# Patient Record
Sex: MALE | Race: WHITE | NOT HISPANIC OR LATINO | Employment: UNEMPLOYED | URBAN - METROPOLITAN AREA
[De-identification: names, ages, dates, MRNs, and addresses within clinical notes are randomized per-mention and may not be internally consistent; named-entity substitution may affect disease eponyms.]

---

## 2017-03-21 ENCOUNTER — ALLSCRIPTS OFFICE VISIT (OUTPATIENT)
Dept: OTHER | Facility: OTHER | Age: 47
End: 2017-03-21

## 2017-04-04 ENCOUNTER — HOSPITAL ENCOUNTER (OUTPATIENT)
Dept: RADIOLOGY | Age: 47
Discharge: HOME | End: 2017-04-04
Payer: COMMERCIAL

## 2017-04-04 DIAGNOSIS — E04.1 NONTOXIC SINGLE THYROID NODULE: ICD-10-CM

## 2017-04-04 PROCEDURE — 76536 US EXAM OF HEAD AND NECK: CPT

## 2017-04-06 ENCOUNTER — GENERIC CONVERSION - ENCOUNTER (OUTPATIENT)
Dept: OTHER | Facility: OTHER | Age: 47
End: 2017-04-06

## 2017-04-07 ENCOUNTER — ALLSCRIPTS OFFICE VISIT (OUTPATIENT)
Dept: OTHER | Facility: OTHER | Age: 47
End: 2017-04-07

## 2017-04-07 DIAGNOSIS — E78.5 HYPERLIPIDEMIA: ICD-10-CM

## 2017-04-07 DIAGNOSIS — E10.9 TYPE 1 DIABETES MELLITUS WITHOUT COMPLICATIONS (HCC): ICD-10-CM

## 2017-04-07 DIAGNOSIS — I10 ESSENTIAL (PRIMARY) HYPERTENSION: ICD-10-CM

## 2017-04-07 DIAGNOSIS — E04.1 NONTOXIC SINGLE THYROID NODULE: ICD-10-CM

## 2017-07-03 ENCOUNTER — GENERIC CONVERSION - ENCOUNTER (OUTPATIENT)
Dept: OTHER | Facility: OTHER | Age: 47
End: 2017-07-03

## 2017-07-31 ENCOUNTER — TRANSCRIBE ORDERS (OUTPATIENT)
Dept: ADMINISTRATIVE | Age: 47
End: 2017-07-31

## 2017-07-31 ENCOUNTER — APPOINTMENT (OUTPATIENT)
Dept: LAB | Age: 47
End: 2017-07-31
Payer: COMMERCIAL

## 2017-07-31 DIAGNOSIS — E78.5 HYPERLIPIDEMIA: ICD-10-CM

## 2017-07-31 DIAGNOSIS — I10 ESSENTIAL (PRIMARY) HYPERTENSION: ICD-10-CM

## 2017-07-31 DIAGNOSIS — E04.1 NONTOXIC SINGLE THYROID NODULE: ICD-10-CM

## 2017-07-31 DIAGNOSIS — Z00.8 HEALTH EXAMINATION IN POPULATION SURVEY: ICD-10-CM

## 2017-07-31 DIAGNOSIS — E10.9 TYPE 1 DIABETES MELLITUS WITHOUT COMPLICATIONS (HCC): ICD-10-CM

## 2017-07-31 DIAGNOSIS — Z00.8 HEALTH EXAMINATION IN POPULATION SURVEY: Primary | ICD-10-CM

## 2017-07-31 LAB
ALBUMIN SERPL BCP-MCNC: 3.7 G/DL (ref 3.5–5)
ALP SERPL-CCNC: 101 U/L (ref 46–116)
ALT SERPL W P-5'-P-CCNC: 37 U/L (ref 12–78)
ANION GAP SERPL CALCULATED.3IONS-SCNC: 5 MMOL/L (ref 4–13)
AST SERPL W P-5'-P-CCNC: 20 U/L (ref 5–45)
BILIRUB SERPL-MCNC: 0.61 MG/DL (ref 0.2–1)
BUN SERPL-MCNC: 18 MG/DL (ref 5–25)
CALCIUM SERPL-MCNC: 9.3 MG/DL (ref 8.3–10.1)
CHLORIDE SERPL-SCNC: 103 MMOL/L (ref 100–108)
CHOLEST SERPL-MCNC: 144 MG/DL (ref 50–200)
CO2 SERPL-SCNC: 29 MMOL/L (ref 21–32)
CREAT SERPL-MCNC: 0.94 MG/DL (ref 0.6–1.3)
CREAT UR-MCNC: 96.2 MG/DL
EST. AVERAGE GLUCOSE BLD GHB EST-MCNC: 180 MG/DL
GFR SERPL CREATININE-BSD FRML MDRD: 98 ML/MIN/1.73SQ M
GLUCOSE P FAST SERPL-MCNC: 164 MG/DL (ref 65–99)
HBA1C MFR BLD: 7.9 % (ref 4.2–6.3)
HDLC SERPL-MCNC: 33 MG/DL (ref 40–60)
LDLC SERPL CALC-MCNC: 80 MG/DL (ref 0–100)
MICROALBUMIN UR-MCNC: 17.4 MG/L (ref 0–20)
MICROALBUMIN/CREAT 24H UR: 18 MG/G CREATININE (ref 0–30)
POTASSIUM SERPL-SCNC: 4 MMOL/L (ref 3.5–5.3)
PROT SERPL-MCNC: 7.4 G/DL (ref 6.4–8.2)
SODIUM SERPL-SCNC: 137 MMOL/L (ref 136–145)
T4 FREE SERPL-MCNC: 1.01 NG/DL (ref 0.76–1.46)
TRIGL SERPL-MCNC: 156 MG/DL
TSH SERPL DL<=0.05 MIU/L-ACNC: 1.99 UIU/ML (ref 0.36–3.74)

## 2017-07-31 PROCEDURE — 84439 ASSAY OF FREE THYROXINE: CPT

## 2017-07-31 PROCEDURE — 80053 COMPREHEN METABOLIC PANEL: CPT

## 2017-07-31 PROCEDURE — 36415 COLL VENOUS BLD VENIPUNCTURE: CPT

## 2017-07-31 PROCEDURE — 83036 HEMOGLOBIN GLYCOSYLATED A1C: CPT

## 2017-07-31 PROCEDURE — 80061 LIPID PANEL: CPT

## 2017-07-31 PROCEDURE — 84443 ASSAY THYROID STIM HORMONE: CPT

## 2017-07-31 PROCEDURE — 82570 ASSAY OF URINE CREATININE: CPT

## 2017-07-31 PROCEDURE — 82043 UR ALBUMIN QUANTITATIVE: CPT

## 2017-08-01 ENCOUNTER — GENERIC CONVERSION - ENCOUNTER (OUTPATIENT)
Dept: OTHER | Facility: OTHER | Age: 47
End: 2017-08-01

## 2017-09-07 ENCOUNTER — ALLSCRIPTS OFFICE VISIT (OUTPATIENT)
Dept: OTHER | Facility: OTHER | Age: 47
End: 2017-09-07

## 2017-10-18 ENCOUNTER — ALLSCRIPTS OFFICE VISIT (OUTPATIENT)
Dept: OTHER | Facility: OTHER | Age: 47
End: 2017-10-18

## 2017-10-25 NOTE — PROGRESS NOTES
Assessment  1  Type 1 diabetes mellitus without complication (743 76) (Y43 7)   2  Nontoxic single thyroid nodule (241 0) (E04 1)   3  Benign essential hypertension (401 1) (I10)   4  Diabetic retinopathy (250 50,362 01) (E11 319)   5  Hyperlipidemia (272 4) (E78 5)   6  Hypoglycemia (251 2) (E16 2)    Plan  Benign essential hypertension    · (1) COMPREHENSIVE METABOLIC PANEL; Status:Active; Requested AXK:[id number];    Perform:[facility]; RNX:07FRN1725; Ordered; For:Benign essential hypertension; Ordered By:Edison Spangler;   · (1) MICROALBUMIN CREATININE RATIO, RANDOM URINE; Status:Active; Requested  WRW:47ARR1733;    Perform:[facility]; [id number]; Ordered; For:Benign essential hypertension; Ordered By:Edison Spangler;  PMH: History of type 2 diabetes mellitus    · MetFORMIN HCl ER (OSM) 500 MG Oral Tablet Extended Release 24 Hour; take  2 tablets twice a day   Rx By: Sadiq Salas; Dispense: 0 Days ; #:360 Tablet Extended Release 24 Hour; Refill: 3;For: PMH: History of type 2 diabetes mellitus; DELIA = N; Sent To: [address]i [address]au[address]; Msg to Pharmacy: 500mg once daily week 1/ 500mg BID week 2/1000mg then 500mg week 3/1000mg BID week 4; Last Updated By: Jeffry Christianson; 2017 9:11:34 AM  Type 1 diabetes mellitus without complication    · HumuLIN R U-500 (CONCENTRATED) 500 UNIT/ML Subcutaneous Solution;  INJECT 200 UNITS (0 4ML) PER DAY VIA INSULIN PUMP   Rx By: Sadiq Salas; Dispense: 0 Days ; #:2 X 20 ML Vial; Refill: 7;For: Type 1 diabetes mellitus without complication; DELIA = N; Sent To: Andi Chauhan Dr; Last Updated By: Jeffry Christianson; 2017 9:11:33 AM   · (1) HEMOGLOBIN A1C; Status:Active; Requested JPZ:[id number];    Perform:[facility]; WLH:11BSN4290; Ordered; For:Type 1 diabetes mellitus without complication; Ordered By:Sandra Spangler;    Discussion/Summary  Discussion Summary:   1  Type 1 diabetes on insulin pump: He is having hyperglycemia overnight and after meals  For his overnight hyperglycemia, I increased his 12 AM basal rate to 1 350, 4 AM basal rate to 1 450 and 8 AM basal rate to 1  10  For his post prandial hyperglycemia, set his I:C ration to 50  I have also started him on metformin starting at 500 mg once daily and progressing to 1000 mg twice daily over the next few weeks  Discussed proper bolusing of insulin for meals at length during the time of the visit  He will start bolusing insulin earlier prior to his meal to mitigate long bouts of hyperglycemia post meal  No changes to his pump settings at this time  He will check his sugars/download his sensor and forward a report to the office in 2 weeks for review and adjustment if necessary  Discussed the increased risk for hypoglycemia especially while utilizing U500 insulin via insulin pump  Episodes of hypoglycemic and lead to permanent disability and death  Reviewed recognition and proper treatment of hypoglycemic episodes  Check hemoglobin A1c prior to next visit  Thyroid nodules: Stable on most recent ultrasound  Thyroid function testing is normal   Hypertension: He is normotensive in the office today  Continue ramipril  Check comprehensive metabolic panel and urine microalbumin  Hyperlipidemia: Continue atorvastatin  Counseling Documentation With Imm: The patient was counseled regarding diagnostic results,-- instructions for management,-- risk factor reductions,-- patient and family education,-- impressions,-- risks and benefits of treatment options,-- importance of compliance with treatment  Medication SE Review and Pt Understands Tx: Possible side effects of new medications were reviewed with the patient/guardian today  The treatment plan was reviewed with the patient/guardian   The patient/guardian understands and agrees with the treatment plan      Chief Complaint  Chief Complaint Free Text Note Form: follow up      History of Present Illness  HPI: 43-year-old male presents in the office today for follow-up of type 1 diabetes, hypertension, hyperlipidemia and thyroid nodule  He was diagnosed with type 1 diabetes at age 24 and he is currently using an animus pump with Humulin U500 insulin and a DEXCOM sensor  He explains that he frequently has hyperglycemia after meals and feels as though he needs to bolus to correct his high blood sugars hours after his meals  He denies any recent episodes of hypoglycemia  He admits to polyuria, polydipsia and blurry vision when his sugars are high  His most recent hemoglobin A1c obtained on July 31, 2017 is 7 9  His current pump settings are as follows:AM-1 225AM-1 350AM-0 975PM-1 100Ratio-1 unit for every 60programs:[id number]-150glucose target range:[id number]-120Beunin every 3 months for Diabetic eye exams and treatment retinopathy/glaucoma  He has no complaints about his feet and does not follow podiatry for regular diabetic foot care  his hypertension, he takes ramp ramipril 10 mg daily  hyperlipidemia is treated with atorvastatin 80 mg daily  most recent ultrasound of the thyroid performed on April 4, 2017 showed multiple stable thyroid nodules less than 1 cm  His TSH on July 31, 2017 was 1 990 with a free T4 of 1 01  He has no complaints of neck pain, dysphagia or dysphonia  He also has no complaints/symptoms of hypo-or hyperthyroidism  Review of Systems  Endo Adult ROS Male Established v2 - [facility]:   Constitutional/General: no recent weight gain,-- no recent weight loss,-- no poor energy/fatigue,-- no increased energy level,-- no insomnia/sleep problems,-- no fever-- and-- no feeling weak  Heart: no high blood pressure,-- no chest pain/tightness,-- no rapid/racing heart rate-- and-- no palpitations  Genitourinary - Urinary no frequent urination,-- no excess urination-- and-- no urinating during the night  Eyes: no blurred vision,-- no double vision,-- no bulging eyes,-- no gritty/scratchy eyes-- and-- no excessive tearing     Mouth / Throat: no hoarseness-- and-- no difficulty swallowing  Neck: no lumps,-- no swollen glands,-- no neck pain,-- no neck stiffness-- and-- no enlarged thyroid  Respiratory: no wheezing,-- no asthma-- and-- no persistent cough  Musculoskeletal: no muscle aches/pain,-- no joint aches/pain-- and-- no muscle weakness  Skin & Hair: no dry skin,-- no acne,-- the hair texture was not oily,-- no hair loss-- and-- no excessive hair growth  Gastrointestinal: no constipation,-- no diarrhea,-- no waking at night to drink-- and-- no stomach ache  Neurological: no blackouts,-- no weakness-- and-- no tremors  Genital: no testicular pain-- and-- no testicular lumps/bumps/mass  Endocrine: no feeling hot frequently,-- no feeling cold frequently,-- no shifts between feeling hot and cold,-- no cold hands or feet,-- no excessive sweating,-- no thyroid problems,-- no blood sugar problems,-- no excessive thirst,-- no excessive hunger,-- no change in shoe size,-- no nausea or vomiting-- and-- no shaky hands  ROS Reviewed:   ROS reviewed  Active Problems  1  Abdominal discomfort (789 00) (R10 9)   2  Acute URI (465 9) (J06 9)   3  Acute UTI (599 0) (N39 0)   4  Benign essential hypertension (401 1) (I10)   5  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)   6  Cough (786 2) (R05)   7  Diabetic retinopathy (250 50,362 01) (E11 319)   8  Dysuria (788 1) (R30 0)   9  Erectile dysfunction (607 84) (N52 9)   10  Erectile dysfunction of non-organic origin (302 72) (F52 21)   11  History of allergy (V15 09) (Z88 9)   12  Hyperlipidemia (272 4) (E78 5)   13  Hypoglycemia (251 2) (E16 2)   14  Leaking of urine (788 30) (R32)   15  Lumbar back pain (724 2) (M54 5)   16  Lumbar dysfunction (739 3) (M99 9)   17  Need for influenza vaccination (V04 81) (Z23)   18  Need for prophylactic vaccination and inoculation against influenza (V04 81) (Z23)   19  Nontoxic single thyroid nodule (241 0) (E04 1)   20   Strain of right psoas muscle (843 8) (69 857 56 57)   21  Type 1 diabetes mellitus without complication (018 84) (V96 2)   22  Voiding dysfunction (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 9) (N39 8)    Past Medical History  1  History of Cough (786 2) (R05)   2  History of diabetes mellitus (V12 29) (Z86 39)   3  History of shortness of breath (V13 89) (Z87 898)   4  History of upper respiratory infection (V12 09) (Z87 09)   5  Need for prophylactic vaccination and inoculation against influenza (V04 81) (Z23)  Active Problems And Past Medical History Reviewed: The active problems and past medical history were reviewed and updated today  Surgical History  1  Denied: History Of Prior Surgery  Surgical History Reviewed: The surgical history was reviewed and updated today  Family History  Mother    1  Family history of Cancer   2  Family history of Lung Cancer (V16 1)  Maternal Grandfather    3  Family history of Cancer   4  Family history of Diabetes Mellitus (V18 0)  Family History    5  Family history of Diabetes Mellitus (V18 0)   6  Family history of cardiac disorder (V17 49) (Z82 49)   7  Family history of hypertension (V17 49) (Z82 49)  Family History Reviewed: The family history was reviewed and updated today  Social History   · Alcohol use   · Being A Social Drinker   · Caffeine Use   · Denied: History of Chewing Nicotine-containing Substances   · Denied: History of Drug Use   · Former smoker (V15 82) (P55 401)   · Marital History - Currently    · Occupation: Homemaker  Social History Reviewed: The social history was reviewed and updated today  Current Meds   1  Aspirin 81 MG TABS; TAKE 1 TABLET DAILY; Therapy: 34YCB2782 to Recorded   2  Atorvastatin Calcium 80 MG Oral Tablet; take one tablet by mouth every day; Therapy: 64Wmk9599 to (Last Rx:60Hzp6314)  Requested for: 32Cun6691 Ordered   3  BD Pen Needle Ashli U/F 32G X 4 MM Miscellaneous; Use three times per day; Therapy: 57VJF1347 to (Evaluate:15Mar2015)  Requested for: 75NOK9828;  Last Rx:20Mar2014 Ordered   4  Glucagon Emergency 1 MG Injection Kit; USE AS DIRECTED; Therapy: 71NPQ1451 to (Evaluate:15Mar2015)  Requested for: 27PSD9186; Last   Rx:20Mar2014 Ordered   5  HumuLIN R U-500 (CONCENTRATED) 500 UNIT/ML Subcutaneous Solution; INJECT   200 UNITS (0 4ML) PER DAY VIA INSULIN PUMP; Therapy: 12HKU5733 to (Last Rx:66Vcs3656)  Requested for: 79HQL7870 Ordered   6  Ibuprofen 200 MG Oral Tablet; Therapy: (Recorded:14Apr2015) to Recorded   7  Lumigan 0 01 % Ophthalmic Solution; INSTILL 1 DROP INTO BOTH EYES ONCE DAILY   IN THE EVENING; Therapy: 79GNN3868 to Recorded   8  Metoclopramide HCl - 5 MG Oral Tablet; TAKE 1 TABLET Every 6 hours; Therapy: 46PNK9456 to Recorded   9  Multivitamins CAPS; Therapy: (Emy Ariza) to Recorded   10  Omeprazole 20 MG Oral Capsule Delayed Release; TAKE 1 CAPSULE DAILY; Therapy: 16SCL9681 to (Evaluate:08Dec2013) Recorded   11  OneTouch Delica Lancets Fine Miscellaneous; Check Sugars 2x daily as directed; Therapy: 69LOX4629 to (Evaluate:04Jun2017)  Requested for: 09VYL5192; Last    Rx:06Mar2017 Ordered   12  OneTouch Ultra 2 w/Device Kit; Test blood Sugars 2 times daily as directed; Therapy: 91YFH1387 to (Last Rx:06Mar2017)  Requested for: 69OXR3695 Ordered   13  OneTouch Ultra Blue In [employer]; check BG 2x per day; Therapy: 04QJA9751 to (Jeremy Palacios)  Requested for: 43QVW1480; Last    Rx:06Mar2017 Ordered   14  Ramipril 10 MG Oral Capsule; One tablet po daily; Therapy: 43EPG3841 to (Esequiel Gandara)  Requested for: 03SQN5030; Last    Rx:18Ygf3588 Ordered   15  Vitamin D 2000 UNIT Oral Tablet; Therapy: (Recorded:20Jun2013) to Recorded  Medication List Reviewed: The medication list was reviewed and updated today  Allergies  1   No Known Drug Allergies    Vitals  Vital Signs    Recorded: 07Sep2017 08:27AM   Heart Rate 92   Systolic 345   Diastolic 70   Height 5 ft 11 in   Weight 291 lb 4 00 oz   BMI Calculated 40 62 BSA Calculated 2 47     Physical Exam    Constitutional   General appearance: No acute distress, well appearing and well nourished  Eyes   Conjunctiva and lids: No swelling, erythema, or discharge  Pupils: Equal, round and reactive to light  The sclera are anicteric  Extraocular movements are intact  Ears, Nose, Mouth, and Throat   External inspection of ears, nose and lips: Normal     Oropharynx: Normal with no erythema, edema, exudate or lesions  Exam of Head: The head is atraumatic and normocephalic  Neck: The neck is supple  The thyroid is normal in size with no palpable nodules  Pulmonary   Respiratory effort: No increased work of breathing or signs of respiratory distress  Auscultation of lungs: Clear to auscultation bilaterally with normal chest expansion  Cardiovascular   Auscultation of heart: Normal rate and rhythm with no murmurs, gallops or rubs  Examination of extremities for edema and/or varicosities: Normal     Examination of pulses: Dorsalis pedal pulses are +2 and equal bilaterally  Examination of Carotids: No bruits  Abdomen   Abdomen: Abdomen is soft, non-tender with normal bowel sounds  Liver and spleen: No hepatomegaly or splenomegaly  Lymphatic   Palpation of lymph nodes: No supraclavicular or suboccipital lymphadenopathy  Musculoskeletal   Gait and station: Normal     Digits and nails: Normal without clubbing or cyanosis  Inspection/palpation of joints, bones, and muscles: Muscle bulk and tone is normal     Skin   Skin and subcutaneous tissue: Abnormal  -- Dry skin  Neurologic   Cranial nerves: Cranial nerves 2-12 intact  Reflexes: 2+ and symmetric  Sensation: No sensory loss  Motor Strength: Strength is 5/5 bilaterally  Psychiatric   Orientation to person, place and time: Normal     Mood and affect: Affect and attention span are normal       Socks and shoes removed, Right Foot Findings: normal foot, no swelling, no erythema   The right toes were normal-- and-- had full range of motion  The sensory exam showed normal vibratory sensation at the level of the toes on the right  Normal tactile sensation with monofilament testing throughout the right foot  Socks and shoes removed, Left Foot Findings: normal foot, no swelling, no erythema  The left toes were normal-- and-- had full range of motion  The sensory exam showed normal vibratory sensation at the level of the toes on the left  Normal tactile sensation with monofilament testing throughout the left foot  Pulses:   2+ in the dorsalis pedis on the right  Pulses:   2+ in the dorsalis pedis on the left  Results/Data  (1) HEMOGLOBIN A1C 92Bhk1717 08:28AM [facility] Order Number: [id number]     Test Name Result Flag Reference   HEMOGLOBIN A1C 7 9 % H 4 2-6 3   EST  AVG  GLUCOSE 180 mg/dl       (1) TSH 50BWW5738 08:28AM [facility] Order Number: [id number]     Test Name Result Flag Reference   TSH 1 990 uIU/mL  0 358-3 740   - Patient Instructions: This bloodwork is non-fasting  Please drink two glasses of water morning of bloodwork  Patients undergoing fluorescein dye angiography may retain small amounts of fluorescein in the body for 48-72 hours post procedure  Samples containing fluorescein can produce falsely depressed TSH values  If the patient had this procedure,a specimen should be resubmitted post fluorescein clearance  (1) T4, FREE 39Roo3256 08:28AM [facility] Order Number: [id number]     Test Name Result Flag Reference   T4,FREE 1 01 ng/dL  0 76-1 46     (1) LIPID PANEL, FASTING 78Ebd6069 08:28AM [facility] Order Number: [id number]     Test Name Result Flag Reference   CHOLESTEROL 144 mg/dL     HDL,DIRECT 33 mg/dL L 40-60   Specimen collection should occur prior to Metamizole administration due to the potential for falsely depressed results     LDL CHOLESTEROL CALCULATED 80 mg/dL 0-100   - Patient Instructions: This is a fasting blood test  Water,black tea or black  coffee only after 9:00pm the night before test   Drink 2 glasses of water the morning of test       Triglyceride:         Normal              <150 mg/dl       Borderline High    150-199 mg/dl       High               200-499 mg/dl       Very High          >499 mg/dl  Cholesterol:         Desirable        <200 mg/dl      Borderline High  200-239 mg/dl      High             >239 mg/dl  HDL Cholesterol:        High    >59 mg/dL      Low     <41 mg/dL  LDL CALCULATED:    This screening LDL is a calculated result  It does not have the accuracy of the Direct Measured LDL in the monitoring of patients with hyperlipidemia and/or statin therapy  Direct Measure LDL (IWX256) must be ordered separately in these patients  TRIGLYCERIDES 156 mg/dL H <=150   Specimen collection should occur prior to N-Acetylcysteine or Metamizole administration due to the potential for falsely depressed results       (1) MICROALBUMIN CREATININE RATIO, RANDOM URINE 97Mgj2014 08:28AM [facility] Order Number: [id number]     Test Name Result Flag Reference   MICROALBUMIN/ CREAT R 18 mg/g creatinine  0-30   MICROALBUMIN,URINE 17 4 mg/L  0 0-20 0   CREATININE URINE 96 2 mg/dL       (1) COMPREHENSIVE METABOLIC PANEL 56OQK5491 35:[facility] Order Number: [id number]     Test Name Result Flag Reference   SODIUM 137 mmol/L  136-145   POTASSIUM 4 0 mmol/L  3 5-5 3   CHLORIDE 103 mmol/L  100-108   CARBON DIOXIDE 29 mmol/L  21-32   ANION GAP (CALC) 5 mmol/L  4-13   BLOOD UREA NITROGEN 18 mg/dL  5-25   CREATININE 0 94 mg/dL  0 60-1 30   Standardized to IDMS reference method   CALCIUM 9 3 mg/dL  8 3-10 1   BILI, TOTAL 0 61 mg/dL  0 20-1 00   ALK PHOSPHATAS 101 U/L     ALT (SGPT) 37 U/L  12-78   AST(SGOT) 20 U/L  5-45   ALBUMIN 3 7 g/dL  3 5-5 0   TOTAL PROTEIN 7 4 g/dL  6 4-8 2   eGFR 98 ml/min/1 73sq m     [employer] Disease Education Program recommendations are as follows:  GFR calculation is accurate only with a steady state creatinine  Chronic Kidney disease less than 60 ml/min/1 73 sq  meters  Kidney failure less than 15 ml/min/1 73 sq  meters  GLUCOSE FASTING 164 mg/dL H 65-99     US THYROID 04Apr2017 10:49AM [facility] Order Number: [id number]    - Patient Instructions: To schedule this appointment, please contact Central Scheduling at [phone]  Test Name Result Flag Reference   US THYROID (Report)     THYROID ULTRASOUND     INDICATION: Follow-up nodules     COMPARISON: 1/13/2015     TECHNIQUE:  Ultrasound of the thyroid was performed with a high frequency linear transducer in transverse and sagittal planes including volumetric imaging sweeps as well as traditional still imaging technique  FINDINGS:   Normal homogeneous smooth echotexture  Right gland: 2 1 x 2 0 x 5 1 cm  Right midpole nodule measuring 0 5 x 0 7 x 0 7 cm  Coarsely calcified nodule  Unchanged from prior  INTERMEDIATE SUSPICION PATTERN (estimated risk of malignancy 10-20%) FNA recommended at >/= 1 cm  Right midpole nodule measuring 0 4 x 0 7 x 0 7 cm  Solid hypoechoic nodule  Unchanged from prior  INTERMEDIATE SUSPICION PATTERN (estimated risk of malignancy 10-20%) FNA recommended at >/= 1 cm  Right lower pole nodule measuring 0 8 x 0 8 x 0 9 cm  Spongiform nodule  Unchanged from prior  VERY LOW SUSPICION PATTERN (estimated risk of malignancy < 3%) consider FNA >/= 2 cm versus observation  Left gland: 1 4 x 1 7 x 4 2 cm  Left midpole nodule measuring 0 4 x 0 6 x 0 6 cm  Solid hypoechoic nodule  Not measured previously  INTERMEDIATE SUSPICION PATTERN (estimated risk of malignancy 10-20%) FNA recommended at >/= 1 cm  Isthmus: The isthmus is 0 3 cm in AP dimension  Right isthmus measuring 0 3 x 0 5 x 0 6 cm  Purely cystic nodule  This nodule has decreased in size from prior   BENIGN (estimated risk of malignancy <1%) FNA not indicated by SARITA criteria  IMPRESSION:      There are multiple thyroid nodules identified as above  These do not meet ultrasound criteria for fine needle aspiration  Reference: 2015 American Thyroid Association Management Guidelines for Adult Patients with Thyroid Nodules and Differentiated Thyroid Cancer  Thyroid, Volume 26, Number 1, 2016          Workstation performed: [id number]     Signed by:   Lisset Lara MD   4/5/17     Signatures   Electronically signed by : Belinda Jensen Sep  7 2017  9:25AM EST                       (Author)    Electronically signed by : CLAU Salcido ; Sep  7 2017 12:13PM EST

## 2017-11-01 DIAGNOSIS — E10.9 TYPE 1 DIABETES MELLITUS WITHOUT COMPLICATIONS (HCC): ICD-10-CM

## 2017-11-01 DIAGNOSIS — I10 ESSENTIAL (PRIMARY) HYPERTENSION: ICD-10-CM

## 2017-12-08 ENCOUNTER — GENERIC CONVERSION - ENCOUNTER (OUTPATIENT)
Dept: OTHER | Facility: OTHER | Age: 47
End: 2017-12-08

## 2017-12-08 DIAGNOSIS — E10.9 TYPE 1 DIABETES MELLITUS WITHOUT COMPLICATIONS (HCC): ICD-10-CM

## 2017-12-08 DIAGNOSIS — M79.672 PAIN OF LEFT FOOT: ICD-10-CM

## 2017-12-11 ENCOUNTER — GENERIC CONVERSION - ENCOUNTER (OUTPATIENT)
Dept: OTHER | Facility: OTHER | Age: 47
End: 2017-12-11

## 2017-12-13 ENCOUNTER — GENERIC CONVERSION - ENCOUNTER (OUTPATIENT)
Dept: OTHER | Facility: OTHER | Age: 47
End: 2017-12-13

## 2018-01-02 ENCOUNTER — APPOINTMENT (OUTPATIENT)
Dept: RADIOLOGY | Age: 48
End: 2018-01-02
Payer: COMMERCIAL

## 2018-01-02 ENCOUNTER — TRANSCRIBE ORDERS (OUTPATIENT)
Dept: ADMINISTRATIVE | Age: 48
End: 2018-01-02

## 2018-01-02 DIAGNOSIS — M79.672 PAIN OF LEFT FOOT: ICD-10-CM

## 2018-01-02 PROCEDURE — 73630 X-RAY EXAM OF FOOT: CPT

## 2018-01-04 ENCOUNTER — GENERIC CONVERSION - ENCOUNTER (OUTPATIENT)
Dept: OTHER | Facility: OTHER | Age: 48
End: 2018-01-04

## 2018-01-10 NOTE — PROGRESS NOTES
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)   6  Cough (786 2) (R05)   7  Diabetic retinopathy (250 50,362 01) (E11 319)   8  Dysuria (788 1) (R30 0)   9  Erectile dysfunction (607 84) (N52 9)   10  Erectile dysfunction of non-organic origin (302 72) (F52 21)   11  History of allergy (V15 09) (Z88 9)   12  Hyperlipidemia (272 4) (E78 5)   13  Hypoglycemia (251 2) (E16 2)   14  Leaking of urine (788 30) (R32)   15  Lumbar back pain (724 2) (M54 5)   16  Lumbar dysfunction (739 3) (M99 9)   17  Need for influenza vaccination (V04 81) (Z23)   18  Need for prophylactic vaccination and inoculation against influenza (V04 81) (Z23)   19  Nontoxic single thyroid nodule (241 0) (E04 1)   20  Strain of right psoas muscle (843 8) (S76 011A)   21  Type 1 diabetes mellitus without complication (684 98) (O72 9)   22  Voiding dysfunction (599 9) (N39 8)    Current Meds   1  Aspirin 81 MG TABS; TAKE 1 TABLET DAILY; Therapy: 39QYF3500 to Recorded   2  Atorvastatin Calcium 80 MG Oral Tablet; take one tablet by mouth every day; Therapy: 91Nat2037 to (Last Rx:65Hid5728)  Requested for: 36Lcj9746 Ordered   3  BD Pen Needle Ashli U/F 32G X 4 MM Miscellaneous; Use three times per day; Therapy: 14FVN9140 to (Evaluate:15Mar2015)  Requested for: 87YOT4350; Last   Rx:20Mar2014 Ordered   4  Glucagon Emergency 1 MG Injection Kit; USE AS DIRECTED; Therapy: 11ITH2387 to (Evaluate:15Mar2015)  Requested for: 97OXP2724; Last   Rx:20Mar2014 Ordered   5  HumuLIN R U-500 (CONCENTRATED) 500 UNIT/ML Subcutaneous Solution; INJECT   200 UNITS (0 4ML) PER DAY VIA INSULIN PUMP; Therapy: 39MPZ5544 to (Last Rx:95Otx4691)  Requested for: 07Sep2017 Ordered   6  Ibuprofen 200 MG Oral Tablet; Therapy: (Recorded:74Sso5736) to Recorded   7  Lumigan 0 01 % Ophthalmic Solution; INSTILL 1 DROP INTO BOTH EYES ONCE DAILY   IN THE EVENING; Therapy: 87JLM1367 to Recorded   8  MetFORMIN HCl ER (OSM) 500 MG Oral Tablet Extended Release 24 Hour; take 2   tablets twice a day; Therapy: 99NYK3431 to (Last Rx:33Kjd6550)  Requested for: 41Ckn8901 Ordered   9  Metoclopramide HCl - 5 MG Oral Tablet; TAKE 1 TABLET Every 6 hours; Therapy: 66VFL3664 to Recorded   10  Multivitamins CAPS; Therapy: (Yosvany Martinez) to Recorded   11  Omeprazole 20 MG Oral Capsule Delayed Release; TAKE 1 CAPSULE DAILY; Therapy: 03KXB5519 to (Evaluate:64Mzl7233) Recorded   12  OneTouch Delica Lancets Fine Miscellaneous; Check Sugars 2x daily as directed; Therapy: 71QBM5927 to (Evaluate:04Jun2017)  Requested for: 13NGE2729; Last    Rx:06Mar2017 Ordered   13  OneTouch Ultra 2 w/Device Kit; Test blood Sugars 2 times daily as directed; Therapy: 34QTG7945 to (Last Rx:06Mar2017)  Requested for: 67ARR0336 Ordered   14  OneTouch Ultra Blue In [employer]; check BG 2x per day; Therapy: 60KVQ1114 to (Gelene Ormond)  Requested for: 78VEZ8682; Last    Rx:06Mar2017 Ordered   15  Ramipril 10 MG Oral Capsule; One tablet po daily; Therapy: 59LJT3670 to (Rikki Yeung)  Requested for: 55QHW3747; Last    Rx:69Gsi3552 Ordered   16  Vitamin D 2000 UNIT Oral Tablet; Therapy: (Recorded:20Jun2013) to Recorded    Allergies    1  No Known Drug Allergies    Plan  Flu vaccine need    · Fluzone Quadrivalent Intramuscular Suspension    Future Appointments    Date/Time Provider Specialty Site   12/08/2017 11:30 AM CLAU [place]   Endocrinology [address] [facility] ENDOCRINOLOGY     Signatures   Electronically signed by : Indra Dallas DO; Oct 18 2017  3:01PM EST                       (Author)

## 2018-01-12 NOTE — RESULT NOTES
Message   There are multiple nodules present that are small  Repeat ultrasound in about a year  Verified Results  US THYROID [id number] 10:49AM Oli Cortes Order Number: [id number]    - Patient Instructions: To schedule this appointment, please contact Central Scheduling at [phone]  Test Name Result Flag Reference   US THYROID (Report)     THYROID ULTRASOUND     INDICATION: Follow-up nodules     COMPARISON: 1/13/2015     TECHNIQUE:  Ultrasound of the thyroid was performed with a high frequency linear transducer in transverse and sagittal planes including volumetric imaging sweeps as well as traditional still imaging technique  FINDINGS:   Normal homogeneous smooth echotexture  Right gland: 2 1 x 2 0 x 5 1 cm  Right midpole nodule measuring 0 5 x 0 7 x 0 7 cm  Coarsely calcified nodule  Unchanged from prior  INTERMEDIATE SUSPICION PATTERN (estimated risk of malignancy 10-20%) FNA recommended at >/= 1 cm  Right midpole nodule measuring 0 4 x 0 7 x 0 7 cm  Solid hypoechoic nodule  Unchanged from prior  INTERMEDIATE SUSPICION PATTERN (estimated risk of malignancy 10-20%) FNA recommended at >/= 1 cm  Right lower pole nodule measuring 0 8 x 0 8 x 0 9 cm  Spongiform nodule  Unchanged from prior  VERY LOW SUSPICION PATTERN (estimated risk of malignancy < 3%) consider FNA >/= 2 cm versus observation  Left gland: 1 4 x 1 7 x 4 2 cm  Left midpole nodule measuring 0 4 x 0 6 x 0 6 cm  Solid hypoechoic nodule  Not measured previously  INTERMEDIATE SUSPICION PATTERN (estimated risk of malignancy 10-20%) FNA recommended at >/= 1 cm  Isthmus: The isthmus is 0 3 cm in AP dimension  Right isthmus measuring 0 3 x 0 5 x 0 6 cm  Purely cystic nodule  This nodule has decreased in size from prior  BENIGN (estimated risk of malignancy <1%) FNA not indicated by SARITA criteria  IMPRESSION:      There are multiple thyroid nodules identified as above   These do not meet ultrasound criteria for fine needle aspiration  Reference: 2015 American Thyroid Association Management Guidelines for Adult Patients with Thyroid Nodules and Differentiated Thyroid Cancer  Thyroid, Volume 26, Number 1, 2016          Workstation performed: [id number]     Signed by:   Lebron Walker MD   4/5/17

## 2018-01-12 NOTE — RESULT NOTES
Message   Hemoglobin A1c is stable  No evidence of kidney disease  Verified Results  (1) HEMOGLOBIN A1C [id number] 04:51PM Tamela Brewsterster Order Number: [id number]     Test Name Result Flag Reference   HEMOGLOBIN A1C 8 1 % H 4 2-6 3   EST  AVG  GLUCOSE 186 mg/dl       (1) COMPREHENSIVE METABOLIC PANEL 77RUC5253 87:01ZQ Tamela Leiter Order Number: [id number]     Test Name Result Flag Reference   GLUCOSE,RANDM 131 mg/dL     If the patient is fasting, the ADA then defines impaired fasting glucose as > 100 mg/dL and diabetes as > or equal to 123 mg/dL  SODIUM 139 mmol/L  136-145   POTASSIUM 4 7 mmol/L  3 5-5 3   CHLORIDE 104 mmol/L  100-108   CARBON DIOXIDE 28 mmol/L  21-32   ANION GAP (CALC) 7 mmol/L  4-13   BLOOD UREA NITROGEN 15 mg/dL  5-25   CREATININE 0 94 mg/dL  0 60-1 30   Standardized to IDMS reference method   CALCIUM 9 2 mg/dL  8 3-10 1   BILI, TOTAL 0 75 mg/dL  0 20-1 00   ALK PHOSPHATAS 94 U/L     ALT (SGPT) 44 U/L  12-78   AST(SGOT) 21 U/L  5-45   ALBUMIN 4 1 g/dL  3 5-5 0   TOTAL PROTEIN 7 5 g/dL  6 4-8 2   eGFR Non-African American      >60 0 ml/min/1 73sq m   [facility] Disease Education Program recommendations are as follows:  GFR calculation is accurate only with a steady state creatinine  Chronic Kidney disease less than 60 ml/min/1 73 sq  meters  Kidney failure less than 15 ml/min/1 73 sq  meters       (1) MICROALBUMIN CREATININE RATIO, RANDOM URINE [id number] 04:51PM Tamela Brewsterster Order Number: [id number]     Test Name Result Flag Reference   MICROALBUMIN/ CREAT R 17 mg/g creatinine  0-30   MICROALBUMIN,URINE 11 8 mg/L  0 0-20 0   CREATININE URINE 69 5 mg/dL

## 2018-01-13 VITALS
SYSTOLIC BLOOD PRESSURE: 126 MMHG | DIASTOLIC BLOOD PRESSURE: 70 MMHG | WEIGHT: 291.25 LBS | BODY MASS INDEX: 40.77 KG/M2 | HEIGHT: 71 IN | HEART RATE: 92 BPM

## 2018-01-13 VITALS
BODY MASS INDEX: 41.49 KG/M2 | HEIGHT: 71 IN | SYSTOLIC BLOOD PRESSURE: 140 MMHG | HEART RATE: 84 BPM | WEIGHT: 296.38 LBS | DIASTOLIC BLOOD PRESSURE: 80 MMHG

## 2018-01-13 NOTE — PROGRESS NOTES
Chief Complaint  Chief Complaint Free Text Note Form: 42 yr-old T1DM patient returns for pump education follow-up  Plan    1  DSMT/MNT Time Record; Status:Complete;   Done: 78BPU2968 01:21PM    Discussion/Summary  Discussion Summary:   Rena Pradhan reports that since changing his carb ratio back to a setting appropriate for U-500 insulin, he has been experiencing more hyperglycemia  He notes that when he tries to correct his bg, it takes too long for him to see an effect  He also asks when he should consider changing his set, since he can't see or feel when his cannula may be bent  He also reports problems with the CGM component of his [place] Vibe pump  Since receiving a new transmitter, he has experienced frequent "Out of Range" alerts which he rarely had before  He also sees big discrepancies between his bg meter and sensor  He has taken his sensor off because of this  I contacted [employer] and they referred the patient to [facility] for sensor issues with the Vibe  The patient will call [employer] support to report the problems he has been having  We reviewed hyperglycemia protocol: if first correction dose does not bring sugar down, change infusion set  I asked Rena Pradhan where he wears his infusion sets, and he described a very limited area on his (R)LQ  There was marked, visible hypertrophy in this area, very asymmetric with the contour of his abd on the (L)  I reviewed proper site rotation with Rena Pradhan and advised him to avoid the hypertrophied area for several months  He has numerous potential sites on the other side of his abd and also on the (R) more lateral to the problem area  I also demonstrated the Contact-Detach steel needle set and gave Rena Pradhan some samples  Should he still experience bent cannulae despite moving sites, he will try these  His pump and sensor were downloaded   He has an appointment with Arlet Velazco PA-C on 10/31/16; he was advised to call sooner if hyperglycemia persists despite these measures, in case his pump settings need adjustment  Future Appointments    Date/Time Provider Specialty Site   10/31/2016 11:10 AM CLAU Morfin   Endocrinology [facility] ENDOCRINOLOGY   03/03/2017 01:45 PM Pattie Castillo, [facility] Urology  [address]     Signatures   Electronically signed by : Lucy Sánchez, ; Sep 27 2016  1:46PM EST                       (Author)    Electronically signed by : CLAU Peters ; Sep 28 2016  9:09AM EST

## 2018-01-14 NOTE — PROGRESS NOTES
Chief Complaint  Chief Complaint Free Text Note Form: 42 yr-old T1DM patient referred by Dr Byron Rojas for insulin pump review  Results/Data  DSMT/MNT Time Record [id number] 02:32PM Bren Patch     Test Name Result Flag Reference   Date of Service 9/1/16     Start - Stop Time 1-1:30PM     Total MInutes 30     Group Or Individual Instruction DSMT-I         Plan    1  DSMT/MNT Time Record; Status:Complete;   Done: 92CKN7401 02:32PM    Discussion/Summary  Discussion Summary:   Muriel Vicente currently uses an [place] Vibe insulin pump  He is not currently using the Dexcom CGM feature due to a defective transmitter that the  is replacing  He knows how to set up connectivity when his new transmitter arrives  The patient describes insulin doses for carbohydrates calculated by the pump that seem too large  He usually backs off on the amount or else his bg will go too low  He also notes that his pump does not seem to credit him for insulin on board when he calculates a correction dose  He reports that his bg's always seem to go up too much right after meals, despite trying to give his bolus 30 minutes before eating  He uses U-500 insulin in his pump  The patient upgraded from an [place] 20/20 pump within the past year, and set up his new pump himself without  assistance  His Insulin On Board feature was OFF, and we enabled it today  Insulin action time was set at 6 hours  His carb ratio was programmed as 12  Previous office notes and pump downloads indicate carb ratios in the 35-40 range, which would be more appropriate for U-500 use  After consultation with Dr Byron Rojas, the patient's ratio was changed to 61  We discussed U-500 insulin action time  The patient will try bolusing 45 minutes before eating when able, to try to decrease his post-meal rise  He will start a new Dexcom sensor session tonight  He is unable to download his Dexcom at home   He agrees to return in 2 weeks for [employer] and evaluation of changes documented above  Future Appointments    Date/Time Provider Specialty Site   10/31/2016 11:10 AM CLAU Morfin   Endocrinology [facility] ENDOCRINOLOGY   09/15/2016 11:00 AM Nathan Bar Diabetes Educator [facility] ENDOCRINOLOGY   03/03/2017 01:45 PM Pattie Castillo, [facility] Urology  [address] [facility]     Signatures   Electronically signed by : Lucy Sánchez, ; Sep  1 2016  2:55PM EST                       (Author)    Electronically signed by : CLAU Peters ; Sep  6 2016 10:47AM EST

## 2018-01-15 NOTE — MISCELLANEOUS
Provider Comments  Provider Comments:   PATIENT NO SHOWED FOR 3- APPT  Signatures   Electronically signed by :  Leopold Barker, [place]; Mar 22 2017  9:49AM EST                       (Author)

## 2018-01-15 NOTE — RESULT NOTES
Message   Normal thyroid testing     Verified Results  (1) T4, FREE 49Ugj9971 04:31PM Imelda [employer]ation Order Number: [id number]     Test Name Result Flag Reference   T4,FREE 0 87 ng/dL  0 76-1 46   - Patient Instructions: This bloodwork is non-fasting  Please drink two glasses of water morning of bloodwork  (1) TSH 45Qyz8591 04:31PM [employer] Order Number: [id number]     Test Name Result Flag Reference   TSH 2 170 uIU/mL  0 358-3 740   - Patient Instructions: This bloodwork is non-fasting  Please drink two glasses of water morning of bloodwork  - Patient Instructions: This bloodwork is non-fasting  Please drink two glasses of water morning of bloodwork  Patients undergoing fluorescein dye angiography may retain small amounts of fluorescein in the body for 48-72 hours post procedure  Samples containing fluorescein can produce falsely depressed TSH values  If the patient had this procedure,a specimen should be resubmitted post fluorescein clearance

## 2018-01-16 NOTE — PROGRESS NOTES
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)   6  Cough (786 2) (R05)   7  Diabetic retinopathy (250 50,362 01) (E11 319)   8  Dysuria (788 1) (R30 0)   9  Erectile dysfunction (607 84) (N52 9)   10  Erectile dysfunction of non-organic origin (302 72) (F52 21)   11  History of allergy (V15 09) (Z88 9)   12  Hyperlipidemia (272 4) (E78 5)   13  Hypoglycemia (251 2) (E16 2)   14  Leaking of urine (788 30) (R32)   15  Lumbar back pain (724 2) (M54 5)   16  Lumbar dysfunction (739 3) (M99 9)   17  Need for prophylactic vaccination and inoculation against influenza (V04 81) (Z23)   18  Nontoxic single thyroid nodule (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) (E04 1)   19  Strain of right psoas muscle (843 8) (S76 311A)   20  Type 1 diabetes mellitus without complication (523 82) (U94 4)   21  Voiding dysfunction (599 9) (N39 8)    Current Meds   1  Aspirin 81 MG TABS; TAKE 1 TABLET DAILY; Therapy: 26LJE8281 to Recorded   2  Atorvastatin Calcium 80 MG Oral Tablet; take one tablet by mouth every day; Therapy: 75Fpe3118 to (Last Rx:46Ndh8807)  Requested for: 90Kld1475 Ordered   3  BD Pen Needle Ashli U/F 32G X 4 MM Miscellaneous; Use three times per day; Therapy: 05FDE6926 to (Evaluate:15Mar2015)  Requested for: 85BGT9625; Last   Rx:20Mar2014 Ordered   4  Glucagon Emergency 1 MG Injection Kit; USE AS DIRECTED; Therapy: 33MKQ9285 to (Evaluate:15Mar2015)  Requested for: 65UUW9846; Last   Rx:20Mar2014 Ordered   5  HumuLIN R U-500 (CONCENTRATED) 500 UNIT/ML Subcutaneous Solution; INJECT   200 UNITS (0 4ML) PER DAY VIA INSULIN PUMP; Therapy: 50NUZ9241 to (Last Rx:02Jun2016)  Requested for: 61Ciq9364 Ordered   6  Ibuprofen 200 MG Oral Tablet; Therapy: (Recorded:14Apr2015) to Recorded   7   Lumigan 0 01 % Ophthalmic Solution; INSTILL 1 DROP INTO BOTH EYES ONCE DAILY   IN THE EVENING; Therapy: 13QVI4308 to Recorded   8  Metoclopramide HCl - 5 MG Oral Tablet; TAKE 1 TABLET Every 6 hours; Therapy: 12HBO4703 to Recorded   9  Multivitamins CAPS; Therapy: (Muriel Daft) to Recorded   10  Omeprazole 20 MG Oral Capsule Delayed Release; TAKE 1 CAPSULE DAILY; Therapy: 54VUM5360 to (Evaluate:20Kwc8107) Recorded   11  Ramipril 5 MG Oral Capsule; TAKE 1 CAPSULE DAILY; Therapy: 54GJM7902 to (Evaluate:92Pps7734)  Requested for: 15Rae6343; Last    Rx:75Fkm6737 Ordered   12  Viagra 100 MG Oral Tablet; TAKE 1 TABLET BY MOUTH 1 HOUR PRIOR TO    INTERCOURSE; Therapy: 32CKZ7418 to (Evaluate:10Aug2016)  Requested for: 57UDN0075; Last    Rx:13Jan2016 Ordered   13  Viagra 50 MG Oral Tablet; TAKE 1 TABLET DAILY 1 HOUR BEFORE NEEDED; Therapy: 96YAR2979 to (Evaluate:03Jan2016)  Requested for: 76Hyp4685; Last    Rx:06Zxx6620 Ordered   14  Vitamin D 2000 UNIT Oral Tablet; Therapy: (Recorded:20Jun2013) to Recorded    Allergies    1  No Known Drug Allergies    Plan  Need for influenza vaccination    · Fluzone Quadrivalent Intramuscular Suspension    Future Appointments    Date/Time Provider Specialty Site   10/31/2016 11:10 AM CLAU Savage   Endocrinology [facility] ENDOCRINOLOGY   03/03/2017 01:45 PM Mayito Mendiola [facility] Urology [address]     Signatures   Electronically signed by : Salvatore Gomez DO; Oct 14 2016 10:39AM EST                       (Author)

## 2018-01-16 NOTE — RESULT NOTES
Message  Our records indicate that you are overdue for bloodwork  Endocrinology ordered bloodwork for you, please get that done as soon as possible so they can continue to monitor your diabetes  We can be reached at [phone]   Thank you      Signatures   Electronically signed by : Zakia Perea, ; Jul  3 2017 11:55AM EST                       (Author)

## 2018-01-16 NOTE — MISCELLANEOUS
Message  Return to work or school:        Our records indicate that you are overdue for your foot evaluation  Please contact your foot doctor and make an appointment  If you have any questions please contact our office at [phone]  Thank you     Kimberli Forbes Do/am       Signatures   Electronically signed by : Lizeth Tom, ; Aug 15 2016 11:12AM EST                       (Author)

## 2018-01-17 NOTE — RESULT NOTES
Verified Results  (1) HEMOGLOBIN A1C 77Dgh4268 08:28AM [facility] Order Number: [id number]     Test Name Result Flag Reference   HEMOGLOBIN A1C 7 9 % H 4 2-6 3   EST  AVG  GLUCOSE 180 mg/dl       (1) TSH 28VSU5558 08:28AM [facility] Order Number: [id number]     Test Name Result Flag Reference   TSH 1 990 uIU/mL  0 358-3 740   - Patient Instructions: This bloodwork is non-fasting  Please drink two glasses of water morning of bloodwork  Patients undergoing fluorescein dye angiography may retain small amounts of fluorescein in the body for 48-72 hours post procedure  Samples containing fluorescein can produce falsely depressed TSH values  If the patient had this procedure,a specimen should be resubmitted post fluorescein clearance  (1) T4, FREE 31Ssg9950 08:28AM [facility] Order Number: [id number]     Test Name Result Flag Reference   T4,FREE 1 01 ng/dL  0 76-1 46     (1) LIPID PANEL, FASTING 81Ewx2047 08:28AM [facility] Order Number: [id number]     Test Name Result Flag Reference   CHOLESTEROL 144 mg/dL     HDL,DIRECT 33 mg/dL L 40-60   Specimen collection should occur prior to Metamizole administration due to the potential for falsely depressed results  LDL CHOLESTEROL CALCULATED 80 mg/dL  0-100   - Patient Instructions: This is a fasting blood test  Water,black tea or black  coffee only after 9:00pm the night before test   Drink 2 glasses of water the morning of test       Triglyceride:         Normal              <150 mg/dl       Borderline High    150-199 mg/dl       High               200-499 mg/dl       Very High          >499 mg/dl  Cholesterol:         Desirable        <200 mg/dl      Borderline High  200-239 mg/dl      High             >239 mg/dl  HDL Cholesterol:        High    >59 mg/dL      Low     <41 mg/dL  LDL CALCULATED:    This screening LDL is a calculated result    It does not have the accuracy of the Direct Measured LDL in the monitoring of patients with hyperlipidemia and/or statin therapy  Direct Measure LDL (LER939) must be ordered separately in these patients  TRIGLYCERIDES 156 mg/dL H <=150   Specimen collection should occur prior to N-Acetylcysteine or Metamizole administration due to the potential for falsely depressed results  (1) MICROALBUMIN CREATININE RATIO, RANDOM URINE 70Upq0047 08:28AM Ruthie Moore Order Number: [id number]     Test Name Result Flag Reference   MICROALBUMIN/ CREAT R 18 mg/g creatinine  0-30   MICROALBUMIN,URINE 17 4 mg/L  0 0-20 0   CREATININE URINE 96 2 mg/dL       (1) COMPREHENSIVE METABOLIC PANEL 39HCR9530 28:79OB [employer] Order Number: [id number]     Test Name Result Flag Reference   SODIUM 137 mmol/L  136-145   POTASSIUM 4 0 mmol/L  3 5-5 3   CHLORIDE 103 mmol/L  100-108   CARBON DIOXIDE 29 mmol/L  21-32   ANION GAP (CALC) 5 mmol/L  4-13   BLOOD UREA NITROGEN 18 mg/dL  5-25   CREATININE 0 94 mg/dL  0 60-1 30   Standardized to IDMS reference method   CALCIUM 9 3 mg/dL  8 3-10 1   BILI, TOTAL 0 61 mg/dL  0 20-1 00   ALK PHOSPHATAS 101 U/L     ALT (SGPT) 37 U/L  12-78   AST(SGOT) 20 U/L  5-45   ALBUMIN 3 7 g/dL  3 5-5 0   TOTAL PROTEIN 7 4 g/dL  6 4-8 2   eGFR 98 ml/min/1 73sq m     National Kidney Disease Education Program recommendations are as follows:  GFR calculation is accurate only with a steady state creatinine  Chronic Kidney disease less than 60 ml/min/1 73 sq  meters  Kidney failure less than 15 ml/min/1 73 sq  meters     GLUCOSE FASTING 164 mg/dL H 65-99

## 2018-01-18 ENCOUNTER — ALLSCRIPTS OFFICE VISIT (OUTPATIENT)
Dept: OTHER | Facility: OTHER | Age: 48
End: 2018-01-18

## 2018-01-18 NOTE — MISCELLANEOUS
Provider Comments  Provider Comments:   PATIENT NO SHOWED FOR 10- APPT  Signatures   Electronically signed by :  Kavitha Dawn; Oct 31 2016  9:36AM EST                       (Author)

## 2018-01-22 ENCOUNTER — ALLSCRIPTS OFFICE VISIT (OUTPATIENT)
Dept: OTHER | Facility: OTHER | Age: 48
End: 2018-01-22

## 2018-01-23 NOTE — RESULT NOTES
Verified Results  * XR FOOT 3+ VIEW LEFT 93MYK6254 10:21AM Paige Mojica    Order Number: [id number]     Test Name Result Flag Reference   XR FOOT 3+ VW LEFT (Report)     LEFT FOOT     INDICATION: A06 681: Pain in left foot  History taken directly from the electronic ordering system  COMPARISON: None     VIEWS: 3     IMAGES: 3     FINDINGS:     There is no acute fracture or dislocation  No degenerative changes  No lytic or blastic lesions are seen  Soft tissues are unremarkable  IMPRESSION:     No acute osseous abnormality         Workstation performed: [id number]     Signed by:   Anamaria Clark MD   1/4/18

## 2018-01-23 NOTE — RESULT NOTES
Discussion/Summary   A1c has remained unchanged  Verified Results  (1) HEMOGLOBIN A1C 28NCT8077 10:48AM Kevin Pate     Test Name Result Flag Reference   HEMOGLOBIN A1C 8 0     EST  AVG   GLUCOSE 183

## 2018-01-23 NOTE — PROGRESS NOTES
Chief Complaint  Chief Complaint Free Text Note Form: 41 yr-old T1DM patient, referred by Dr Vandana Hilliard to explore insulin pump upgrade options  Discussion/Summary  Discussion Summary:   Juan Manuel Alexander has been using an [place] Vibe insulin pump for several years, and doesn't know when his warranty is up  He currently uses U-500, although he notes that since starting Victoza his sugars have been much lower and he wonders if at some point he will be able to use U-100 insulin again  He really likes his Dexcom CGM and would be interested in a pump that is compatible  We contacted [employer] and verified Alex's pump is in warranty until 11/16/19  Ander Lin will support his pump until 9/30/19  The patient stated he is willing to wait out his current warranty and switch to the Tandem t:slim X2 at that time  We discussed the fact that any hybrid closed loop features on future pump software upgrades could only be used with U-100 insulin  Product and contact information for [employer] was provided  The patient was encouraged to contact me for upgrade training when he receives his new pump         Future Appointments    Date/Time Provider Specialty Site   01/22/2018 01:00 PM Christiano Blake,  Family Medicine [facility]   03/08/2018 09:00 AM Erlinda Zhao, [facility] Endocrinology [facility] ENDOCRINOLOGY     Signatures   Electronically signed by : Yesi James, ; Jan 18 2018 10:53AM EST                       (Author)    Electronically signed by : CLAU Akhtar ; Jan 18 2018 10:53AM EST

## 2018-01-23 NOTE — PROGRESS NOTES
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)    Plan   Accessory skin tags    · Follow-up PRN Evaluation and Treatment  Follow-up  Status: Complete  Done:    [id number] 01:29PM    Discussion/Summary      Here for skin tags removed times 4 without complications  Call if any problems  Proper sterile technique used  The patient was counseled regarding  The treatment plan was reviewed with the patient/guardian  The patient/guardian understands and agrees with the treatment plan      Chief Complaint   Pt here to remove a lesion on the neck  History of Present Illness   HPI: Pt here to remove a lesion on the neck  Here for excision of 4 skin tags  Pt  is a diabetic and is much better with Victoza  Review of Systems        Constitutional: no fever or chills, feels well, no tiredness, no recent weight loss or weight gain  ENT: no complaints of earache, no loss of hearing, no nosebleeds or nasal discharge, no sore throat or hoarseness  Cardiovascular: no complaints of slow or fast heart rate, no chest pain, no palpitations, no leg claudication or lower extremity edema  Respiratory: no complaints of shortness of breath, no wheezing or cough, no dyspnea on exertion, no orthopnea or PND  Gastrointestinal: no complaints of abdominal pain, no constipation, no nausea or vomiting, no diarrhea or bloody stools  Genitourinary: no complaints of dysuria or incontinence, no hesitancy, no nocturia, no genital lesion, no inadequacy of penile erection  Musculoskeletal: no complaints of arthralgia, no myalgia, no joint swelling or stiffness, no limb pain or swelling  Integumentary: as noted in HPI  Neurological: no complaints of headache, no confusion, no numbness or tingling, no dizziness or fainting  Active Problems   1  Abdominal discomfort (789 00) (R10 9)   2  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 39) (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)   3  Acute URI (465 9) (J06 9)   4  Acute UTI (599 0) (N39 0)   5   Benign essential hypertension (401 1) (I10)   6  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)   7  Cough (786 2) (R05)   8  Diabetic retinopathy (250 50,362 01) (E11 319)   9  Dysuria (788 1) (R30 0)   10  Erectile dysfunction (607 84) (N52 9)   11  Erectile dysfunction of non-organic origin (302 72) (F52 21)   12  Flu vaccine need (V04 81) (Z23)   13  History of allergy (V15 09) (Z88 9)   14  Hyperlipidemia (272 4) (E78 5)   15  Hypoglycemia (251 2) (E16 2)   16  Leaking of urine (788 30) (R32)   17  Left foot pain (729 5) (M79 672)   18  Lumbar back pain (724 2) (M54 5)   19  Lumbar dysfunction (739 3) (M99 9)   20  Need for influenza vaccination (V04 81) (Z23)   21  Need for prophylactic vaccination and inoculation against influenza (V04 81) (Z23)   22  Nontoxic single thyroid nodule (241 0) (E04 1)   23  Strain of right psoas muscle (843 8) (S76 011A)   24  Type 1 diabetes mellitus without complication (705 00) (B89 1)   25  Uncontrolled type 2 diabetes mellitus without complication, with long-term current use of      insulin (250 02,V58 67) (E11 65,Z79 4)   26  Voiding dysfunction (599 9) (N39 8)    Past Medical History   1  History of Cough (786 2) (R05)   2  History of diabetes mellitus (V12 29) (Z86 39)   3  History of shortness of breath (V13 89) (Z87 898)   4  History of upper respiratory infection (V12 09) (Z87 09)   5  Need for prophylactic vaccination and inoculation against influenza (V04 81) (Z23)    Family History   Mother    1  Family history of Cancer   2  Family history of Lung Cancer (V16 1)  Maternal Grandfather    3  Family history of Cancer   4  Family history of Diabetes Mellitus (V18 0)  Family History    5  Family history of Diabetes Mellitus (V18 0)   6  Family history of cardiac disorder (V17 49) (Z82 49)   7   Family history of hypertension (V17 49) (Z82 49)    Social History    · Alcohol use   · Being A Social Drinker   · Caffeine Use   · Denied: History of Chewing Nicotine-containing Substances   · Denied: History of Drug Use   · Former smoker (V49 60) (E32 268)   · Marital History - Currently    · Occupation: Homemaker    Surgical History   1  Denied: History Of Prior Surgery    Current Meds    1  Aspirin 81 MG TABS; TAKE 1 TABLET DAILY; Therapy: 89BVW1820 to Recorded   2  Atorvastatin Calcium 80 MG Oral Tablet; take one tablet by mouth every day; Therapy: 32Kbd9064 to (Last Rx:13Sep2017)  Requested for: 76Svn4014 Ordered   3  BD Pen Needle Ashli U/F 32G X 4 MM Miscellaneous; Use once a day; Therapy: 93LRB4760 to (Last Rx:08Dec2017)  Requested for: 37SKK4682 Ordered   4  BD Pen Needle Ashli U/F 32G X 4 MM Miscellaneous; Use three times per day; Therapy: 60MFM7740 to (Evaluate:15Mar2015)  Requested for: 62LXR3940; Last     Rx:20Mar2014 Ordered   5  Glucagon Emergency 1 MG Injection Kit; USE AS DIRECTED; Therapy: 14XAC1508 to (Evaluate:15Mar2015)  Requested for: 28KSQ1223; Last     Rx:20Mar2014 Ordered   6  HumuLIN R U-500 (CONCENTRATED) 500 UNIT/ML Subcutaneous Solution; INJECT     200 UNITS (0 4ML) PER DAY VIA INSULIN PUMP; Therapy: 70GLK2195 to (Last Rx:07Sep2017)  Requested for: 07Sep2017 Ordered   7  Ibuprofen 200 MG Oral Tablet; Therapy: (Recorded:60Giv2881) to Recorded   8  Lumigan 0 01 % Ophthalmic Solution; INSTILL 1 DROP INTO BOTH EYES ONCE DAILY     IN THE EVENING; Therapy: 11PLE4988 to Recorded   9  Metoclopramide HCl - 5 MG Oral Tablet; TAKE 1 TABLET Every 6 hours; Therapy: 89NXY2502 to Recorded   10  Multivitamins CAPS; Therapy: (Milo Bangladeshi) to Recorded   11  Omeprazole 20 MG Oral Capsule Delayed Release; TAKE 1 CAPSULE DAILY; Therapy: 75PEK1477 to (Evaluate:08Dec2013) Recorded   12  OneTouch Delica Lancets Fine Miscellaneous; Check Sugars 2x daily as directed; Therapy: 55XNP4206 to (Evaluate:04Jun2017)  Requested for: 23BSD6463; Last      Rx:06Mar2017 Ordered   13  OneTouch Ultra 2 w/Device Kit;  Test blood Sugars 2 times daily as directed; Therapy: 80QCB6413 to (Last Rx:06Mar2017)  Requested for: 01MRZ9001 Ordered   14  OneTouch Ultra Blue In [employer]; check BG 2x per day; Therapy: 21LDC6013 to (Boo [place])  Requested for: 39JHM4889; Last      Rx:06Mar2017 Ordered   15  Ramipril 10 MG Oral Capsule; TAKE ONE CAPSULE BY MOUTH EVERY DAY; Therapy: 42STU4585 to (Last NX:84IXM5778)  Requested for: 80BUJ8418 Ordered   16  Victoza 18 MG/3ML Subcutaneous Solution Pen-injector; Start 0 6 mg per day and titrate      in 10 days to 1 2 mg and then to 1 8 mg per day 10 days      later; Therapy: 85PIF4849 to (Last Rx:32Ljw0409)  Requested for: 59UYF4917 Ordered   17  Vitamin D 2000 UNIT Oral Tablet; Therapy: (Recorded:20Jun2013) to Recorded    Allergies   1  No Known Drug Allergies    Vitals    Recorded: 12GMN1295 50:52ZP   Systolic 153   Diastolic 82   Height Unobtainable Yes   Weight Unobtainable Yes     Physical Exam        Constitutional      General appearance: No acute distress, well appearing and well nourished  Pulmonary      Respiratory effort: No increased work of breathing or signs of respiratory distress  Auscultation of lungs: Clear to auscultation, equal breath sounds bilaterally, no wheezes, no rales, no rhonci  Cardiovascular      Palpation of heart: Normal PMI, no thrills  Auscultation of heart: Normal rate and rhythm, normal S1 and S2, without murmurs  Examination of extremities for edema and/or varicosities: Normal        Carotid pulses: Normal        Lymphatic      Palpation of lymph nodes in neck: No lymphadenopathy  Musculoskeletal      Gait and station: Normal        Digits and nails: Normal without clubbing or cyanosis  Inspection/palpation of joints, bones, and muscles: Normal        Skin      Skin and subcutaneous tissue: Abnormal  -- skin tags times 4 in anterior neck  Neurologic      Cranial nerves: Cranial nerves 2-12 intact  Reflexes: 2+ and symmetric  Sensation: No sensory loss  Psychiatric      Orientation to person, place and time: Normal        Mood and affect: Normal           Procedure                 Procedure: excision of lesion  Indications for the procedure include skin tags  Procedure Note:     Anesthesia: The lesion was located on the anterior neck times 4  The patient was prepped and draped in the usual sterile fashion using alcohol  The base of the wound was cauterized with aluminum chloride used for cautery  Post-Procedure:      Patient Status: the patient tolerated the procedure well  Complications: there were no complications  Follow-up in the office as needed                    Future Appointments      Date/Time Provider Specialty Site   03/08/2018 09:00 AM Juna[facility]     Signatures    Electronically signed by : Juan C Guerrero DO; Jan 22 2018  1:30PM EST                       (Author)

## 2018-01-24 VITALS
HEIGHT: 71 IN | BODY MASS INDEX: 41.04 KG/M2 | DIASTOLIC BLOOD PRESSURE: 80 MMHG | WEIGHT: 293.13 LBS | SYSTOLIC BLOOD PRESSURE: 132 MMHG

## 2018-01-24 VITALS
BODY MASS INDEX: 41.61 KG/M2 | HEIGHT: 71 IN | SYSTOLIC BLOOD PRESSURE: 138 MMHG | WEIGHT: 297.25 LBS | HEART RATE: 88 BPM | DIASTOLIC BLOOD PRESSURE: 72 MMHG

## 2018-02-21 RX ORDER — RAMIPRIL 10 MG/1
1 CAPSULE ORAL DAILY
COMMUNITY
Start: 2016-07-28 | End: 2019-02-27 | Stop reason: SDUPTHER

## 2018-02-21 RX ORDER — OMEPRAZOLE 20 MG/1
1 CAPSULE, DELAYED RELEASE ORAL DAILY
COMMUNITY
Start: 2013-06-11 | End: 2018-04-04

## 2018-02-21 RX ORDER — IBUPROFEN 200 MG
TABLET ORAL
COMMUNITY
End: 2020-03-21 | Stop reason: ALTCHOICE

## 2018-02-21 RX ORDER — BLOOD-GLUCOSE METER
EACH MISCELLANEOUS
COMMUNITY
Start: 2017-03-06 | End: 2021-12-29

## 2018-02-21 RX ORDER — METFORMIN HYDROCHLORIDE EXTENDED-RELEASE TABLETS 500 MG/1
2 TABLET, FILM COATED, EXTENDED RELEASE ORAL 2 TIMES DAILY
COMMUNITY
Start: 2014-05-22 | End: 2018-04-04

## 2018-02-21 RX ORDER — MULTIVIT-MIN/IRON/FOLIC ACID/K 18-600-40
CAPSULE ORAL
COMMUNITY

## 2018-02-21 RX ORDER — METOCLOPRAMIDE 5 MG/1
1 TABLET ORAL EVERY 6 HOURS
COMMUNITY
Start: 2013-06-11 | End: 2018-04-04

## 2018-02-21 RX ORDER — ATORVASTATIN CALCIUM 80 MG/1
1 TABLET, FILM COATED ORAL DAILY
COMMUNITY
Start: 2013-12-20 | End: 2018-09-07 | Stop reason: SDUPTHER

## 2018-02-22 ENCOUNTER — OFFICE VISIT (OUTPATIENT)
Dept: FAMILY MEDICINE CLINIC | Facility: CLINIC | Age: 48
End: 2018-02-22
Payer: COMMERCIAL

## 2018-02-22 VITALS
HEIGHT: 72 IN | WEIGHT: 288.6 LBS | SYSTOLIC BLOOD PRESSURE: 146 MMHG | BODY MASS INDEX: 39.09 KG/M2 | DIASTOLIC BLOOD PRESSURE: 88 MMHG

## 2018-02-22 DIAGNOSIS — J11.1 INFLUENZA: Primary | ICD-10-CM

## 2018-02-22 DIAGNOSIS — J06.9 UPPER RESPIRATORY TRACT INFECTION, UNSPECIFIED TYPE: ICD-10-CM

## 2018-02-22 DIAGNOSIS — R05.9 COUGH: ICD-10-CM

## 2018-02-22 PROCEDURE — 99214 OFFICE O/P EST MOD 30 MIN: CPT | Performed by: FAMILY MEDICINE

## 2018-02-22 RX ORDER — OSELTAMIVIR PHOSPHATE 75 MG/1
75 CAPSULE ORAL EVERY 12 HOURS SCHEDULED
Qty: 10 CAPSULE | Refills: 0 | Status: SHIPPED | OUTPATIENT
Start: 2018-02-22 | End: 2018-02-27

## 2018-02-22 RX ORDER — AZITHROMYCIN 250 MG/1
TABLET, FILM COATED ORAL
Qty: 6 TABLET | Refills: 0 | Status: SHIPPED | OUTPATIENT
Start: 2018-02-22 | End: 2018-02-27

## 2018-02-22 NOTE — PROGRESS NOTES
Assessment/Plan:   Chief Complaint   Patient presents with    Cold Like Symptoms     symptoms started 3 weeks ago    Cough    Headache    Sore Throat    Generalized Body Aches    Vomiting     yesterday     Patient Instructions   Rest and fluids, start Tamiflu and Z-Pack for URI and cough  Start Dimetapp DM cold and cough and call if worse  Diagnoses and all orders for this visit:    Influenza    Upper respiratory tract infection, unspecified type    Cough    Other orders  -     Cholecalciferol (VITAMIN D) 2000 units CAPS; Take by mouth  -     Liraglutide (VICTOZA) 18 MG/3ML SOPN; Inject under the skin  -     ramipril (ALTACE) 10 MG capsule; Take 1 capsule by mouth daily  -     glucose blood (ONE TOUCH ULTRA TEST) test strip; by In Vitro route  -     Blood Glucose Monitoring Suppl (ONE TOUCH ULTRA 2) w/Device KIT; by Does not apply route  -     ONETOUCH DELICA LANCETS FINE MISC; by Does not apply route  -     omeprazole (PriLOSEC) 20 mg delayed release capsule; Take 1 capsule by mouth daily  -     Multiple Vitamins-Minerals (MULTIVITAMINS PLUS ZINC) CAPS; Take by mouth  -     metoclopramide (REGLAN) 5 mg tablet; Take 1 tablet by mouth every 6 (six) hours  -     metFORMIN (FORTAMET) 500 MG (OSM) 24 hr tablet; Take 2 tablets by mouth 2 (two) times a day  -     bimatoprost (LUMIGAN) 0 01 % ophthalmic drops; Apply 1 drop to eye Daily  -     ibuprofen (MOTRIN) 200 mg tablet; Take by mouth  -     insulin regular (HUMULIN R) 500 units/mL CONCENTRATED injection; Inject under the skin  -     glucagon (GLUCAGON EMERGENCY) 1 MG injection; Inject as directed Twice daily  -     Insulin Pen Needle (BD PEN NEEDLE NADYA U/F) 32G X 4 MM MISC; by Does not apply route daily  -     atorvastatin (LIPITOR) 80 mg tablet; Take 1 tablet by mouth daily  -     aspirin 81 MG tablet; Take 1 tablet by mouth daily          Subjective:     Patient ID: Tamela Crews is a 41 y o  male      Here for cold and cough and headache and sore throat and body aches  No cp or sob  Started 2 weks ago  Review of Systems   Constitutional: Negative  HENT: Positive for sore throat  Eyes: Negative  Respiratory: Positive for cough  Cardiovascular: Negative  Gastrointestinal: Positive for vomiting  Endocrine: Negative  Genitourinary: Negative  Musculoskeletal: Negative  Body aches     Skin: Negative  Allergic/Immunologic: Negative  Neurological: Positive for headaches  Hematological: Negative  Psychiatric/Behavioral: Negative  Objective:     Physical Exam   Constitutional: He is oriented to person, place, and time  He appears well-developed and well-nourished  HENT:   Head: Normocephalic and atraumatic  Right Ear: External ear normal    Left Ear: External ear normal    Nose: Nose normal    pnd   Eyes: Conjunctivae and EOM are normal  Pupils are equal, round, and reactive to light  Neck: Normal range of motion  Neck supple  Cardiovascular: Normal rate, regular rhythm, normal heart sounds and intact distal pulses  Pulmonary/Chest: Effort normal and breath sounds normal    Cough     Musculoskeletal: Normal range of motion  Neurological: He is alert and oriented to person, place, and time  He has normal reflexes  Skin: Skin is warm and dry  Psychiatric: He has a normal mood and affect   His behavior is normal

## 2018-02-22 NOTE — PATIENT INSTRUCTIONS
Rest and fluids, start Tamiflu and Z-Pack for URI and cough  Start Dimetapp DM cold and cough and call if worse

## 2018-03-14 DIAGNOSIS — E10.65 TYPE 1 DIABETES MELLITUS WITH HYPERGLYCEMIA (HCC): Primary | ICD-10-CM

## 2018-03-22 ENCOUNTER — TELEPHONE (OUTPATIENT)
Dept: ENDOCRINOLOGY | Facility: CLINIC | Age: 48
End: 2018-03-22

## 2018-03-29 LAB
LEFT EYE DIABETIC RETINOPATHY: NORMAL
RIGHT EYE DIABETIC RETINOPATHY: NORMAL
SEVERITY (EYE EXAM): NORMAL

## 2018-03-30 PROCEDURE — 3072F LOW RISK FOR RETINOPATHY: CPT | Performed by: FAMILY MEDICINE

## 2018-04-04 ENCOUNTER — OFFICE VISIT (OUTPATIENT)
Dept: ENDOCRINOLOGY | Facility: CLINIC | Age: 48
End: 2018-04-04
Payer: COMMERCIAL

## 2018-04-04 VITALS
WEIGHT: 292 LBS | HEART RATE: 94 BPM | HEIGHT: 72 IN | DIASTOLIC BLOOD PRESSURE: 70 MMHG | SYSTOLIC BLOOD PRESSURE: 136 MMHG | BODY MASS INDEX: 39.55 KG/M2

## 2018-04-04 DIAGNOSIS — E11.65 TYPE 2 DIABETES MELLITUS WITH HYPERGLYCEMIA, WITH LONG-TERM CURRENT USE OF INSULIN (HCC): ICD-10-CM

## 2018-04-04 DIAGNOSIS — E10.65 TYPE 1 DIABETES MELLITUS WITH HYPERGLYCEMIA (HCC): Primary | ICD-10-CM

## 2018-04-04 DIAGNOSIS — E78.2 MIXED HYPERLIPIDEMIA: ICD-10-CM

## 2018-04-04 DIAGNOSIS — Z79.4 TYPE 2 DIABETES MELLITUS WITH HYPERGLYCEMIA, WITH LONG-TERM CURRENT USE OF INSULIN (HCC): ICD-10-CM

## 2018-04-04 LAB
ALBUMIN SERPL BCP-MCNC: 4 G/DL (ref 3.5–5)
ALP SERPL-CCNC: 107 U/L (ref 46–116)
ALT SERPL W P-5'-P-CCNC: 36 U/L (ref 12–78)
ANION GAP SERPL CALCULATED.3IONS-SCNC: 5 MMOL/L (ref 4–13)
AST SERPL W P-5'-P-CCNC: 19 U/L (ref 5–45)
BILIRUB SERPL-MCNC: 0.42 MG/DL (ref 0.2–1)
BUN SERPL-MCNC: 20 MG/DL (ref 5–25)
CALCIUM SERPL-MCNC: 9.4 MG/DL (ref 8.3–10.1)
CHLORIDE SERPL-SCNC: 100 MMOL/L (ref 100–108)
CHOLEST SERPL-MCNC: 133 MG/DL (ref 50–200)
CO2 SERPL-SCNC: 31 MMOL/L (ref 21–32)
CREAT SERPL-MCNC: 1.02 MG/DL (ref 0.6–1.3)
EST. AVERAGE GLUCOSE BLD GHB EST-MCNC: 166 MG/DL
GFR SERPL CREATININE-BSD FRML MDRD: 88 ML/MIN/1.73SQ M
GLUCOSE SERPL-MCNC: 262 MG/DL (ref 65–140)
HBA1C MFR BLD: 7.4 % (ref 4.2–6.3)
HDLC SERPL-MCNC: 28 MG/DL (ref 40–60)
LDLC SERPL CALC-MCNC: 59 MG/DL (ref 0–100)
POTASSIUM SERPL-SCNC: 4.8 MMOL/L (ref 3.5–5.3)
PROT SERPL-MCNC: 7.5 G/DL (ref 6.4–8.2)
SODIUM SERPL-SCNC: 136 MMOL/L (ref 136–145)
T4 FREE SERPL-MCNC: 0.95 NG/DL (ref 0.76–1.46)
TRIGL SERPL-MCNC: 232 MG/DL
TSH SERPL DL<=0.05 MIU/L-ACNC: 1.58 UIU/ML (ref 0.36–3.74)

## 2018-04-04 PROCEDURE — 80061 LIPID PANEL: CPT | Performed by: INTERNAL MEDICINE

## 2018-04-04 PROCEDURE — 84443 ASSAY THYROID STIM HORMONE: CPT | Performed by: INTERNAL MEDICINE

## 2018-04-04 PROCEDURE — 36415 COLL VENOUS BLD VENIPUNCTURE: CPT | Performed by: INTERNAL MEDICINE

## 2018-04-04 PROCEDURE — 84439 ASSAY OF FREE THYROXINE: CPT | Performed by: INTERNAL MEDICINE

## 2018-04-04 PROCEDURE — 95251 CONT GLUC MNTR ANALYSIS I&R: CPT | Performed by: INTERNAL MEDICINE

## 2018-04-04 PROCEDURE — 83036 HEMOGLOBIN GLYCOSYLATED A1C: CPT | Performed by: INTERNAL MEDICINE

## 2018-04-04 PROCEDURE — 80053 COMPREHEN METABOLIC PANEL: CPT | Performed by: INTERNAL MEDICINE

## 2018-04-04 PROCEDURE — 99214 OFFICE O/P EST MOD 30 MIN: CPT | Performed by: INTERNAL MEDICINE

## 2018-04-04 NOTE — PROGRESS NOTES
Sandi Berumen 57 y o  male MRN: [id number]    Encounter: [id number]      Assessment/Plan     Assessment: This is a 57y o -year-old male with diabetes with hyperglycemia and hyperlipidemia  Plan:  1  Type 1 and type 2 diabetes with hyperglycemia - based on review of his continuous glucose monitor, he is having hyperglycemia between 10 a m  and 10 p m  He does admit that he is not using the bolus wizard as often as he should  I emphasized the importance of using the wizard all the time  In addition, I added Jardiance 25 milligrams per day to his regimen  He is going to meet with our educator to learn how to put his glucose in the pump so it prints in the report  Routine laboratory testing is ordered  2   Hyperlipidemia-continue statin  CC: Diabetes    History of Present Illness     HPI:  82-year-old male with double diabetes who presents for follow-up  He is on an animas insulin pump for which she receives U 500 regular insulin  His current basal rate is midnight 1 35 units, 4 a m  1 45 units, 8 a m  1 1 unit and 6 p m  1 2 units  His insulin to carbohydrate ratio is 1 unit per 50 grams and his insulin sensitivity factor is 1 unit per 100 to 150 milligrams/deciliter with a target of 120 milligrams/deciliter  He denies any hypoglycemia  For the hyperlipidemia, he is on a statin  He denies any myalgia  Review of Systems   Constitutional: Negative for chills and fever  Respiratory: Negative for shortness of breath  Cardiovascular: Negative for chest pain  Gastrointestinal: Negative for constipation, diarrhea, nausea and vomiting  Endocrine: Negative for polydipsia and polyuria  All other systems reviewed and are negative  Historical Information   Past Medical History:   Diagnosis Date    Diabetes mellitus ([facility] [address] )     Vitamin D deficiency      No past surgical history on file    Social History   History   Alcohol Use    Yes     History   Drug Use No     History   Smoking Status  Never Smoker   Smokeless Tobacco    Never Used     Family History: No family history on file  Meds/Allergies   Current Outpatient Prescriptions   Medication Sig Dispense Refill    aspirin 81 MG tablet Take 1 tablet by mouth daily      atorvastatin (LIPITOR) 80 mg tablet Take 1 tablet by mouth daily      bimatoprost (LUMIGAN) 0 01 % ophthalmic drops Apply 1 drop to eye Daily      Blood Glucose Monitoring Suppl (ONE TOUCH ULTRA 2) w/Device KIT by Does not apply route      Cholecalciferol (VITAMIN D) 2000 units CAPS Take by mouth      glucagon (GLUCAGON EMERGENCY) 1 MG injection Inject as directed Twice daily      ibuprofen (MOTRIN) 200 mg tablet Take by mouth      Insulin Pen Needle (BD PEN NEEDLE NADYA U/F) 32G X 4 MM MISC by Does not apply route daily      insulin regular (HUMULIN R) 500 units/mL CONCENTRATED injection Inject under the skin      Liraglutide (VICTOZA) 18 MG/3ML SOPN Inject under the skin      Multiple Vitamins-Minerals (MULTIVITAMINS PLUS ZINC) CAPS Take by mouth      ONE TOUCH ULTRA TEST test strip CHECK SUGARS 2 TIMES DAILY AS DIRECTED 200 each 3    ONETOUCH DELICA LANCETS FINE MISC by Does not apply route      ramipril (ALTACE) 10 MG capsule Take 1 capsule by mouth daily       No current facility-administered medications for this visit  No Known Allergies    Objective   Vitals: Blood pressure 136/70, pulse 94, height 5' 11 5" (1 816 m), weight 132 kg (292 lb)  Physical Exam   Constitutional: He is oriented to person, place, and time  He appears well-developed and well-nourished  HENT:   Head: Normocephalic and atraumatic  Mouth/Throat: Oropharynx is clear and moist and mucous membranes are normal    Eyes: Conjunctivae, EOM and lids are normal    Neck: Neck supple  No thyromegaly present  Cardiovascular: Normal rate, regular rhythm and normal heart sounds  Pulmonary/Chest: Effort normal and breath sounds normal    Abdominal: Soft   Bowel sounds are normal  Musculoskeletal: Normal range of motion  Lymphadenopathy:        Head (right side): No occipital adenopathy present  Head (left side): No occipital adenopathy present  Right: No supraclavicular adenopathy present  Left: No supraclavicular adenopathy present  Neurological: He is alert and oriented to person, place, and time  Skin: Skin is warm and intact  Psychiatric: He has a normal mood and affect  His behavior is normal    Vitals reviewed  The history was obtained from the review of the chart, patient  Lab Results:   Lab Results   Component Value Date/Time    Hemoglobin A1C 7 9 (H) 07/31/2017 08:28 AM    BUN 18 07/31/2017 08:28 AM    Sodium 137 07/31/2017 08:28 AM    Potassium 4 0 07/31/2017 08:28 AM    Chloride 103 07/31/2017 08:28 AM    CO2 29 07/31/2017 08:28 AM    Creatinine 0 94 07/31/2017 08:28 AM    AST 20 07/31/2017 08:28 AM    ALT 37 07/31/2017 08:28 AM    Albumin 3 7 07/31/2017 08:28 AM    Cholesterol 144 07/31/2017 08:28 AM    HDL, Direct 33 (L) 07/31/2017 08:28 AM    Triglycerides 156 (H) 07/31/2017 08:28 AM           Imaging Studies: I have personally reviewed pertinent reports  Portions of the record may have been created with voice recognition software  Occasional wrong word or "sound a like" substitutions may have occurred due to the inherent limitations of voice recognition software  Read the chart carefully and recognize, using context, where substitutions have occurred

## 2018-04-05 DIAGNOSIS — E11.65 TYPE 2 DIABETES MELLITUS WITH HYPERGLYCEMIA, WITH LONG-TERM CURRENT USE OF INSULIN (HCC): Primary | ICD-10-CM

## 2018-04-05 DIAGNOSIS — Z79.4 TYPE 2 DIABETES MELLITUS WITH HYPERGLYCEMIA, WITH LONG-TERM CURRENT USE OF INSULIN (HCC): Primary | ICD-10-CM

## 2018-04-06 RX ORDER — LIRAGLUTIDE 6 MG/ML
INJECTION SUBCUTANEOUS
Qty: 6 ML | Refills: 0 | Status: SHIPPED | OUTPATIENT
Start: 2018-04-06 | End: 2018-04-10 | Stop reason: SDUPTHER

## 2018-04-06 NOTE — PROGRESS NOTES
Please call the patient regarding his abnormal result  Hemoglobin A1c has improved  The HDL cholesterol is very low  Exercise improves this  The blood sugar is elevated

## 2018-04-09 ENCOUNTER — TELEPHONE (OUTPATIENT)
Dept: ENDOCRINOLOGY | Facility: CLINIC | Age: 48
End: 2018-04-09

## 2018-04-09 NOTE — TELEPHONE ENCOUNTER
----- Message from Diane Doran MD sent at 4/6/2018  8:25 AM EDT -----  Please call the patient regarding his abnormal result  Hemoglobin A1c has improved  The HDL cholesterol is very low  Exercise improves this  The blood sugar is elevated

## 2018-04-10 DIAGNOSIS — Z79.4 TYPE 2 DIABETES MELLITUS WITH HYPERGLYCEMIA, WITH LONG-TERM CURRENT USE OF INSULIN (HCC): ICD-10-CM

## 2018-04-10 DIAGNOSIS — E11.65 TYPE 2 DIABETES MELLITUS WITH HYPERGLYCEMIA, WITH LONG-TERM CURRENT USE OF INSULIN (HCC): ICD-10-CM

## 2018-04-12 ENCOUNTER — TELEPHONE (OUTPATIENT)
Dept: ENDOCRINOLOGY | Facility: CLINIC | Age: 48
End: 2018-04-12

## 2018-04-13 ENCOUNTER — TELEPHONE (OUTPATIENT)
Dept: ENDOCRINOLOGY | Facility: CLINIC | Age: 48
End: 2018-04-13

## 2018-04-18 ENCOUNTER — TELEPHONE (OUTPATIENT)
Dept: ENDOCRINOLOGY | Facility: CLINIC | Age: 48
End: 2018-04-18

## 2018-04-18 NOTE — TELEPHONE ENCOUNTER
Spoke to Diabetic management regarding continuous glucose monitor  They stated that last office note dated from 4/4/18 will need an addendum to state how many times patient is testing  (3xs)  Can you make an addendum to that note?

## 2018-04-26 ENCOUNTER — TELEPHONE (OUTPATIENT)
Dept: ENDOCRINOLOGY | Facility: CLINIC | Age: 48
End: 2018-04-26

## 2018-05-17 DIAGNOSIS — Z79.4 TYPE 2 DIABETES MELLITUS WITH HYPERGLYCEMIA, WITH LONG-TERM CURRENT USE OF INSULIN (HCC): ICD-10-CM

## 2018-05-17 DIAGNOSIS — E11.65 TYPE 2 DIABETES MELLITUS WITH HYPERGLYCEMIA, WITH LONG-TERM CURRENT USE OF INSULIN (HCC): ICD-10-CM

## 2018-09-07 DIAGNOSIS — E78.2 MIXED HYPERLIPIDEMIA: Primary | ICD-10-CM

## 2018-09-07 RX ORDER — ATORVASTATIN CALCIUM 80 MG/1
TABLET, FILM COATED ORAL
Qty: 90 TABLET | Refills: 0 | Status: SHIPPED | OUTPATIENT
Start: 2018-09-07 | End: 2018-12-12 | Stop reason: SDUPTHER

## 2018-09-19 ENCOUNTER — CLINICAL SUPPORT (OUTPATIENT)
Dept: FAMILY MEDICINE CLINIC | Facility: CLINIC | Age: 48
End: 2018-09-19
Payer: COMMERCIAL

## 2018-09-19 ENCOUNTER — TELEPHONE (OUTPATIENT)
Dept: FAMILY MEDICINE CLINIC | Facility: CLINIC | Age: 48
End: 2018-09-19

## 2018-09-19 DIAGNOSIS — Z11.1 SCREENING FOR TUBERCULOSIS: Primary | ICD-10-CM

## 2018-09-19 PROCEDURE — 86580 TB INTRADERMAL TEST: CPT | Performed by: FAMILY MEDICINE

## 2018-09-21 ENCOUNTER — CLINICAL SUPPORT (OUTPATIENT)
Dept: FAMILY MEDICINE CLINIC | Facility: CLINIC | Age: 48
End: 2018-09-21

## 2018-09-21 DIAGNOSIS — Z11.1 ENCOUNTER FOR PPD SKIN TEST READING: Primary | ICD-10-CM

## 2018-10-04 ENCOUNTER — OFFICE VISIT (OUTPATIENT)
Dept: FAMILY MEDICINE CLINIC | Facility: CLINIC | Age: 48
End: 2018-10-04
Payer: COMMERCIAL

## 2018-10-04 VITALS
SYSTOLIC BLOOD PRESSURE: 124 MMHG | WEIGHT: 284 LBS | HEIGHT: 72 IN | DIASTOLIC BLOOD PRESSURE: 84 MMHG | BODY MASS INDEX: 38.47 KG/M2

## 2018-10-04 DIAGNOSIS — Z00.00 HEALTH CARE MAINTENANCE: ICD-10-CM

## 2018-10-04 DIAGNOSIS — N52.9 ERECTILE DYSFUNCTION, UNSPECIFIED ERECTILE DYSFUNCTION TYPE: ICD-10-CM

## 2018-10-04 DIAGNOSIS — E66.9 OBESITY (BMI 30-39.9): ICD-10-CM

## 2018-10-04 DIAGNOSIS — N52.9 ERECTILE DYSFUNCTION, UNSPECIFIED ERECTILE DYSFUNCTION TYPE: Primary | ICD-10-CM

## 2018-10-04 DIAGNOSIS — E10.65 TYPE 1 DIABETES MELLITUS WITH HYPERGLYCEMIA (HCC): Primary | ICD-10-CM

## 2018-10-04 DIAGNOSIS — I10 ESSENTIAL HYPERTENSION: ICD-10-CM

## 2018-10-04 DIAGNOSIS — Z23 NEED FOR INFLUENZA VACCINATION: ICD-10-CM

## 2018-10-04 DIAGNOSIS — Z23 NEED FOR TETANUS BOOSTER: ICD-10-CM

## 2018-10-04 DIAGNOSIS — T14.8XXA BLISTER: ICD-10-CM

## 2018-10-04 DIAGNOSIS — E78.5 HYPERLIPIDEMIA, UNSPECIFIED HYPERLIPIDEMIA TYPE: ICD-10-CM

## 2018-10-04 PROCEDURE — 90686 IIV4 VACC NO PRSV 0.5 ML IM: CPT

## 2018-10-04 PROCEDURE — 99396 PREV VISIT EST AGE 40-64: CPT | Performed by: FAMILY MEDICINE

## 2018-10-04 PROCEDURE — 90471 IMMUNIZATION ADMIN: CPT

## 2018-10-04 NOTE — PATIENT INSTRUCTIONS
Here for general PE and rec  f-up with urology for ED and hx of painful erection and also Endo as directed for Diabetes  Rec  Losing weight and exercising as directed  He is on insulin and also wants his testosterone checked due to his erectile dysfunction  Monitor blister on right foot, it has not popped and avoid getting an infection it is not infected at the moment

## 2018-10-04 NOTE — PROGRESS NOTES
Assessment/Plan:  Chief Complaint   Patient presents with    Physical Exam     flu vaccine and tdap    Foot Blister     bottom of R foot     Patient Instructions   Here for general PE and rec  f-up with urology for ED and hx of painful erection and also Endo as directed for Diabetes  Rec  Losing weight and exercising as directed  He is on insulin and also wants his testosterone checked due to his erectile dysfunction  Monitor blister on right foot, it has not popped and avoid getting an infection it is not infected at the moment  No problem-specific Assessment & Plan notes found for this encounter  Diagnoses and all orders for this visit:    Type 1 diabetes mellitus with hyperglycemia ([facility] [address] )  -     Comprehensive metabolic panel; Future  -     CBC and differential; Future  -     Hemoglobin A1C  -     Cancel: Microalbumin / creatinine urine ratio  -     Microalbumin / creatinine urine ratio    Health care maintenance  -     CBC and differential; Future    Hyperlipidemia, unspecified hyperlipidemia type  -     Comprehensive metabolic panel; Future  -     CBC and differential; Future  -     Testosterone, free, total; Future    Essential hypertension  -     Comprehensive metabolic panel; Future  -     CBC and differential; Future    Erectile dysfunction, unspecified erectile dysfunction type  -     CBC and differential; Future  -     Testosterone, free, total; Future    Obesity (BMI 30-39 9)  -     CBC and differential; Future    Blister          Subjective:      Patient ID: Hina Louis is a 57 y o  male  Here for General PE and also walks for exercise and states he eats healthy and sees Endo for his diabetes  He checks his feet nightly and also gets eyes checked yearly  He has a blister right foot and has no signs of infection  He did see urology in past for Ed and tried Viagra which did not help and he has had a painful erection for 1 hour without being on Viagra           The following portions of the patient's history were reviewed and updated as appropriate: allergies, current medications, past family history, past medical history, past social history, past surgical history and problem list     Review of Systems   Constitutional:        Obesity   HENT: Negative  Eyes: Negative  Respiratory: Negative  Cardiovascular: Negative  Gastrointestinal: Negative  Endocrine: Negative  Genitourinary:        Erectile dysfunction   Musculoskeletal: Negative  Skin: Negative  Allergic/Immunologic: Negative  Neurological: Negative  Hematological: Negative  Psychiatric/Behavioral: Negative  Objective:      /84 (BP Location: Left arm, Patient Position: Sitting, Cuff Size: Large)   Ht 5' 11 5" (1 816 m)   Wt 129 kg (284 lb)   BMI 39 06 kg/m²          Physical Exam   Constitutional: He is oriented to person, place, and time  He appears well-developed and well-nourished  obesity   HENT:   Head: Normocephalic and atraumatic  Right Ear: External ear normal    Left Ear: External ear normal    Nose: Nose normal    Mouth/Throat: Oropharynx is clear and moist    Eyes: Pupils are equal, round, and reactive to light  Conjunctivae and EOM are normal    Neck: Normal range of motion  Neck supple  Cardiovascular: Normal rate, regular rhythm, normal heart sounds and intact distal pulses  Pulmonary/Chest: Effort normal and breath sounds normal    Abdominal: Soft  Bowel sounds are normal    Genitourinary: Penis normal    Musculoskeletal: Normal range of motion  Neurological: He is alert and oriented to person, place, and time  He has normal reflexes  Skin: Skin is warm and dry  Right foot blister between 2nd and 3rd toe   Psychiatric: He has a normal mood and affect   His behavior is normal

## 2018-10-15 DIAGNOSIS — E10.9 TYPE 1 DIABETES MELLITUS WITHOUT COMPLICATION (HCC): Primary | ICD-10-CM

## 2018-10-15 RX ORDER — INSULIN HUMAN 500 [IU]/ML
INJECTION, SOLUTION SUBCUTANEOUS
Qty: 40 ML | Refills: 0 | Status: SHIPPED | OUTPATIENT
Start: 2018-10-15 | End: 2018-12-20 | Stop reason: SDUPTHER

## 2018-11-23 ENCOUNTER — TELEPHONE (OUTPATIENT)
Dept: ENDOCRINOLOGY | Facility: CLINIC | Age: 48
End: 2018-11-23

## 2018-11-23 NOTE — TELEPHONE ENCOUNTER
Received certificate of medical necessity to dispense a CGM due to dead battery   Form completed and faxed back as requested

## 2018-11-28 ENCOUNTER — TELEPHONE (OUTPATIENT)
Dept: ENDOCRINOLOGY | Facility: CLINIC | Age: 48
End: 2018-11-28

## 2018-11-28 NOTE — TELEPHONE ENCOUNTER
Received message from [facility] stated they received order but need ov notes and BS logs faxed to [phone]   Faxed as requested

## 2018-12-11 ENCOUNTER — OFFICE VISIT (OUTPATIENT)
Dept: FAMILY MEDICINE CLINIC | Facility: CLINIC | Age: 48
End: 2018-12-11
Payer: COMMERCIAL

## 2018-12-11 VITALS
BODY MASS INDEX: 38.41 KG/M2 | WEIGHT: 283.6 LBS | HEIGHT: 72 IN | DIASTOLIC BLOOD PRESSURE: 72 MMHG | SYSTOLIC BLOOD PRESSURE: 120 MMHG

## 2018-12-11 DIAGNOSIS — E66.9 OBESITY (BMI 30-39.9): ICD-10-CM

## 2018-12-11 DIAGNOSIS — E10.65 TYPE 1 DIABETES MELLITUS WITH HYPERGLYCEMIA (HCC): Primary | ICD-10-CM

## 2018-12-11 DIAGNOSIS — I10 ESSENTIAL HYPERTENSION: ICD-10-CM

## 2018-12-11 DIAGNOSIS — R05.9 COUGH: ICD-10-CM

## 2018-12-11 DIAGNOSIS — J06.9 UPPER RESPIRATORY TRACT INFECTION, UNSPECIFIED TYPE: ICD-10-CM

## 2018-12-11 PROCEDURE — 3074F SYST BP LT 130 MM HG: CPT | Performed by: FAMILY MEDICINE

## 2018-12-11 PROCEDURE — 3078F DIAST BP <80 MM HG: CPT | Performed by: FAMILY MEDICINE

## 2018-12-11 PROCEDURE — 3008F BODY MASS INDEX DOCD: CPT | Performed by: FAMILY MEDICINE

## 2018-12-11 PROCEDURE — 99214 OFFICE O/P EST MOD 30 MIN: CPT | Performed by: FAMILY MEDICINE

## 2018-12-11 RX ORDER — AZITHROMYCIN 250 MG/1
TABLET, FILM COATED ORAL
Qty: 6 TABLET | Refills: 0 | Status: SHIPPED | OUTPATIENT
Start: 2018-12-11 | End: 2018-12-16

## 2018-12-11 NOTE — PROGRESS NOTES
Assessment/Plan:  Chief Complaint   Patient presents with    Cold Like Symptoms     symptoms started on Saturday   Cough    Nasal Congestion    Earache     ears are starting to get itchy  Not much in the way of pain   Generalized Body Aches    Fatigue     Patient Instructions   Rest and fluids, start abx and use Dimetapp DM cold and cough, monitor BS while sick and also take meds as directed for diabetes and BP  No problem-specific Assessment & Plan notes found for this encounter  Diagnoses and all orders for this visit:    Type 1 diabetes mellitus with hyperglycemia ([address] )    Upper respiratory tract infection, unspecified type  -     azithromycin (ZITHROMAX) 250 mg tablet; Take 2 tablets today then 1 tablet daily x 4 days    Cough  -     azithromycin (ZITHROMAX) 250 mg tablet; Take 2 tablets today then 1 tablet daily x 4 days    Essential hypertension    Obesity (BMI 30-39  9)          Subjective:      Patient ID: Jonathan Chauhan is a 57 y o  male  Cold Like Symptoms (symptoms started on Saturday )  Cough   Nasal Congestion   Earache (ears are starting to get itchy  Not much in the way of pain )  Generalized Body Aches   Fatigue     Hx of diabetes  No cp or sob, or ha, started with sore throat aklso and is tired and achey also  The following portions of the patient's history were reviewed and updated as appropriate: allergies, current medications, past family history, past medical history, past social history, past surgical history and problem list     Review of Systems   Constitutional: Positive for fatigue  HENT: Positive for congestion and ear pain  Eyes: Negative  Respiratory: Positive for cough  Cardiovascular: Negative  Gastrointestinal: Negative  Endocrine: Negative  Genitourinary: Negative  Musculoskeletal: Positive for arthralgias  Skin: Negative  Allergic/Immunologic: Negative  Neurological: Negative  Hematological: Negative  Psychiatric/Behavioral: Negative  Objective:      /72   Ht 5' 11 5" (1 816 m)   Wt 129 kg (283 lb 9 6 oz)   BMI 39 00 kg/m²          Physical Exam   Constitutional: He is oriented to person, place, and time  He appears well-developed and well-nourished  HENT:   Head: Normocephalic and atraumatic  Right Ear: External ear normal    Left Ear: External ear normal    pnd and nasal congestion   Eyes: Pupils are equal, round, and reactive to light  Conjunctivae and EOM are normal    Neck: Normal range of motion  Neck supple  Cardiovascular: Normal rate, regular rhythm, normal heart sounds and intact distal pulses  Pulmonary/Chest: Effort normal and breath sounds normal    Cough     Musculoskeletal: Normal range of motion  Neurological: He is alert and oriented to person, place, and time  He has normal reflexes  Skin: Skin is warm and dry  Psychiatric: He has a normal mood and affect   His behavior is normal

## 2018-12-11 NOTE — PATIENT INSTRUCTIONS
Rest and fluids, start abx and use Dimetapp DM cold and cough, monitor BS while sick and also take meds as directed for diabetes and BP

## 2018-12-11 NOTE — LETTER
December 11, 2018     Patient: [place]   YOB: 1970   Date of Visit: 12/11/2018       To Whom it May Concern:    Caitlin Mesa is under my professional care  He was seen in my office on 12/11/2018  He may return to work on 12/13/2018  If you have any questions or concerns, please don't hesitate to call           Sincerely,          Indra Dallas, DO        CC: No Recipients

## 2018-12-12 DIAGNOSIS — E78.2 MIXED HYPERLIPIDEMIA: ICD-10-CM

## 2018-12-14 RX ORDER — ATORVASTATIN CALCIUM 80 MG/1
TABLET, FILM COATED ORAL
Qty: 90 TABLET | Refills: 0 | Status: SHIPPED | OUTPATIENT
Start: 2018-12-14 | End: 2019-02-27 | Stop reason: SDUPTHER

## 2018-12-20 DIAGNOSIS — E10.9 TYPE 1 DIABETES MELLITUS WITHOUT COMPLICATION (HCC): ICD-10-CM

## 2018-12-21 RX ORDER — INSULIN HUMAN 500 [IU]/ML
INJECTION, SOLUTION SUBCUTANEOUS
Qty: 40 ML | Refills: 0 | Status: SHIPPED | OUTPATIENT
Start: 2018-12-21 | End: 2019-02-19 | Stop reason: SDUPTHER

## 2019-02-19 ENCOUNTER — OFFICE VISIT (OUTPATIENT)
Dept: ENDOCRINOLOGY | Facility: CLINIC | Age: 49
End: 2019-02-19
Payer: COMMERCIAL

## 2019-02-19 VITALS
BODY MASS INDEX: 41.64 KG/M2 | HEART RATE: 93 BPM | HEIGHT: 71 IN | SYSTOLIC BLOOD PRESSURE: 160 MMHG | DIASTOLIC BLOOD PRESSURE: 100 MMHG | WEIGHT: 297.4 LBS

## 2019-02-19 DIAGNOSIS — E11.65 TYPE 2 DIABETES MELLITUS WITH HYPERGLYCEMIA, WITH LONG-TERM CURRENT USE OF INSULIN (HCC): Primary | ICD-10-CM

## 2019-02-19 DIAGNOSIS — E04.1 THYROID NODULE: ICD-10-CM

## 2019-02-19 DIAGNOSIS — Z79.4 TYPE 2 DIABETES MELLITUS WITH HYPERGLYCEMIA, WITH LONG-TERM CURRENT USE OF INSULIN (HCC): Primary | ICD-10-CM

## 2019-02-19 DIAGNOSIS — E10.9 TYPE 1 DIABETES MELLITUS WITHOUT COMPLICATION (HCC): ICD-10-CM

## 2019-02-19 DIAGNOSIS — E78.2 MIXED HYPERLIPIDEMIA: ICD-10-CM

## 2019-02-19 PROCEDURE — 99215 OFFICE O/P EST HI 40 MIN: CPT | Performed by: NURSE PRACTITIONER

## 2019-02-19 NOTE — PROGRESS NOTES
Established Patient Progress Note      Chief Complaint   Patient presents with    Follow-up          History of Present Illness:   Yolanda Steele is a 57 y o  male with a history of HLD, thyroid nodule, and double diabetes with long term use of insulin  Reports complications of neuropathy  Last A1C 7 4  He has not been seen since April 2018  He is currently using animas pump with  insulin and dexcom sensor  He reports he has been out of [place] for over two week and has been getting over a cold  BG have been elevated  Review of pump download shows he is not putting his BG data into his pump  Average BG on sensor 208 with time in range at 36% and time above range 62%  Denies recent illness or hospitalizations  Denies recent severe hypoglycemic or severe hyperglycemic episodes  Denies any issues with his current regimen  Home glucose monitoring: are performed regularly 4x per day  Current Insulin pump settings:  Basal rate: 12 am 1 350, 4 am 1 450, 8 am 1 10, 6 pm 1 20  Insulin to carb ratio: 12 am 50  Insulin sensitivity factor: 12 am 150, 8 am 100, 9 pm 150  BG target: 120  Active Insulin time: 6 hr      Backup Plan: patient aware that in case of malfunctioning of the pump or unexplained hyperglycemia to use basal and bolus therapy as backup which is prescribed to the patient  Also notified patient to call clinic and/or pump company if any issues or go to emergency department if signs/symptoms of DKA  Current regimen: Victoza 1 8 mg (patient has been out of his medication)  compliant all of the timedenies any side effects from current medications     Hypoglycemic episodes: Yes rare   H/o of hypoglycemia causing hospitalization or Intervention such as glucagon injection or ambulance call No   Hypoglycemia symptoms: dizziness, headache and sweating   Treatment of hypoglycemia: orange juice   Glucagon:Yes     Last Eye Exam: July 2018, retinopathy   Last Foot Exam: does not see podiatrist     Has hypertension: Taking Ramipril   Has hyperlipidemia: Taking Atorvastatin     Has thyroid nodules: denies neck pain, dysphonia, dysphagia, or dyspnea      Patient Active Problem List   Diagnosis    Type 1 diabetes mellitus with hyperglycemia ([facility] )    Type 2 diabetes mellitus with hyperglycemia, with long-term current use of insulin ([place] [address] )    Mixed hyperlipidemia    Thyroid nodule      Past Medical History:   Diagnosis Date    Diabetes mellitus ([place] [address] )     Vitamin D deficiency       Past Surgical History:   Procedure Laterality Date    NO PAST SURGERIES        Family History   Problem Relation Age of Onset    Lung cancer Mother     Cancer Maternal Grandfather     Diabetes Maternal Grandfather     Diabetes Family     Other Family         cardiac disorder    Hypertension Family      Social History     Tobacco Use    Smoking status: Current Every Day Smoker     Types: Cigarettes    Smokeless tobacco: Never Used   Substance Use Topics    Alcohol use: Yes     Comment: socially     No Known Allergies      Current Outpatient Medications:     aspirin 81 MG tablet, Take 1 tablet by mouth daily, Disp: , Rfl:     atorvastatin (LIPITOR) 80 mg tablet, TAKE ONE TABLET BY MOUTH EVERY DAY, Disp: 90 tablet, Rfl: 0    bimatoprost (LUMIGAN) 0 01 % ophthalmic drops, Apply 1 drop to eye Daily, Disp: , Rfl:     Blood Glucose Monitoring Suppl (ONE TOUCH ULTRA 2) w/Device KIT, by Does not apply route, Disp: , Rfl:     Cholecalciferol (VITAMIN D) 2000 units CAPS, Take by mouth, Disp: , Rfl:     glucagon (GLUCAGON EMERGENCY) 1 MG injection, Inject as directed Twice daily, Disp: , Rfl:     ibuprofen (MOTRIN) 200 mg tablet, Take by mouth, Disp: , Rfl:     Insulin Pen Needle (BD PEN NEEDLE NADYA U/F) 32G X 4 MM MISC, by Does not apply route daily, Disp: , Rfl:     insulin regular (HUMULIN R) 500 units/mL CONCENTRATED injection, Inject 0 4 mL (200 Units total) under the skin daily Via pump, Disp: 40 mL, Rfl: 0    liraglutide (VICTOZA) injection, Inject 0 3 mL (1 8 mg total) under the skin daily, Disp: 27 mL, Rfl: 3    Multiple Vitamins-Minerals (MULTIVITAMINS PLUS ZINC) CAPS, Take by mouth, Disp: , Rfl:     ONE TOUCH ULTRA TEST test strip, CHECK SUGARS 2 TIMES DAILY AS DIRECTED, Disp: 200 each, Rfl: 3    ONETOUCH DELICA LANCETS FINE MISC, by Does not apply route, Disp: , Rfl:     ramipril (ALTACE) 10 MG capsule, Take 1 capsule by mouth daily, Disp: , Rfl:     Empagliflozin 25 MG TABS, Take 1 tablet (25 mg total) by mouth every morning (Patient not taking: Reported on 12/11/2018 ), Disp: 90 tablet, Rfl: 3    Review of Systems   Constitutional: Negative for activity change, appetite change and fatigue  HENT: Negative for sore throat, trouble swallowing and voice change  Eyes: Negative for visual disturbance  Respiratory: Negative for choking, chest tightness and shortness of breath  Cardiovascular: Negative for chest pain, palpitations and leg swelling  Gastrointestinal: Negative for abdominal pain, constipation and diarrhea  Endocrine: Negative for cold intolerance, heat intolerance, polydipsia, polyphagia and polyuria  Genitourinary: Negative for frequency  Musculoskeletal: Negative for arthralgias and myalgias  Skin: Negative for rash  Neurological: Negative for dizziness and syncope  Hematological: Negative for adenopathy  Psychiatric/Behavioral: Negative for sleep disturbance  All other systems reviewed and are negative  Physical Exam:  Body mass index is 41 48 kg/m²  /100   Pulse 93   Ht 5' 11" (1 803 m)   Wt 135 kg (297 lb 6 4 oz)   BMI 41 48 kg/m²    Wt Readings from Last 3 Encounters:   02/19/19 135 kg (297 lb 6 4 oz)   12/11/18 129 kg (283 lb 9 6 oz)   10/04/18 129 kg (284 lb)       Physical Exam   Constitutional: He is oriented to person, place, and time  He appears well-developed and well-nourished  No distress  HENT:   Head: Normocephalic and atraumatic     Mouth/Throat: Oropharynx is clear and moist    Eyes: Pupils are equal, round, and reactive to light  Conjunctivae and EOM are normal    Neck: Normal range of motion  Neck supple  No thyromegaly present  Cardiovascular: Normal rate, regular rhythm and normal heart sounds  No murmur heard  Pulmonary/Chest: Effort normal and breath sounds normal  No respiratory distress  He has no wheezes  He has no rales  Abdominal: Soft  Bowel sounds are normal  He exhibits no distension  There is no tenderness  Musculoskeletal: Normal range of motion  He exhibits no edema  Lymphadenopathy:     He has no cervical adenopathy  Neurological: He is alert and oriented to person, place, and time  Skin: Skin is warm and dry  Psychiatric: He has a normal mood and affect  Vitals reviewed        Labs:     Lab Results   Component Value Date    HGBA1C 7 4 (H) 04/04/2018       Lab Results   Component Value Date     11/06/2015    K 4 8 04/04/2018     04/04/2018    CO2 31 04/04/2018    ANIONGAP 5 11/06/2015    AGAP 5 04/04/2018    BUN 20 04/04/2018    CREATININE 1 02 04/04/2018    GLUC 262 (H) 04/04/2018    GLUF 164 (H) 07/31/2017    CALCIUM 9 4 04/04/2018    AST 19 04/04/2018    ALT 36 04/04/2018    ALKPHOS 107 04/04/2018    PROT 7 2 11/06/2015    TP 7 5 04/04/2018    BILITOT 0 61 11/06/2015    TBILI 0 42 04/04/2018    EGFR 88 04/04/2018         Lab Results   Component Value Date    CHOL 148 08/04/2015    HDL 28 (L) 04/04/2018    TRIG 232 (H) 04/04/2018       Lab Results   Component Value Date    MOW3JMVLJFTN 1 580 04/04/2018    JIQ7YUYXPTZS 1 990 07/31/2017    KTQ2ZIFSNPIU 2 170 07/28/2016     Lab Results   Component Value Date    FREET4 0 95 04/04/2018 04/04/17    THYROID ULTRASOUND     INDICATION: Follow-up nodules     COMPARISON: 1/13/2015     TECHNIQUE:   Ultrasound of the thyroid was performed with a high frequency linear transducer in transverse and sagittal planes including volumetric imaging sweeps as well as traditional still imaging technique      FINDINGS:  Normal homogeneous smooth echotexture      Right gland:  2 1 x 2 0 x 5 1 cm  Right midpole nodule measuring 0 5 x 0 7 x 0 7 cm  Coarsely calcified nodule  Unchanged from prior  INTERMEDIATE SUSPICION PATTERN (estimated risk of malignancy 10-20%) FNA recommended at >/= 1 cm  Right midpole nodule measuring 0 4 x 0 7 x 0 7 cm  Solid hypoechoic nodule  Unchanged from prior  INTERMEDIATE SUSPICION PATTERN (estimated risk of malignancy 10-20%) FNA recommended at >/= 1 cm      Right lower pole nodule measuring 0 8 x 0 8 x 0 9 cm  Spongiform nodule  Unchanged from prior  VERY LOW SUSPICION PATTERN (estimated risk of malignancy < 3%) consider FNA >/=  2 cm versus observation            Left gland:  1 4 x 1 7 x 4 2 cm  Left midpole nodule measuring 0 4 x 0 6 x 0 6 cm  Solid hypoechoic nodule  Not measured previously  INTERMEDIATE SUSPICION PATTERN (estimated risk of malignancy 10-20%) FNA recommended at >/= 1 cm              Isthmus: The isthmus is 0 3 cm in AP dimension  Right isthmus measuring 0 3 x 0 5 x 0 6 cm  Purely cystic nodule  This nodule has decreased in size from prior  BENIGN (estimated risk of malignancy <1%) FNA not indicated by SARITA criteria         IMPRESSION:     There are multiple thyroid nodules identified as above  These do not meet ultrasound criteria for fine needle aspiration  Impression & Plan:    Problem List Items Addressed This Visit        Endocrine    Type 2 diabetes mellitus with hyperglycemia, with long-term current use of insulin ([address] ) - Primary     No recent A1C, he is not putting any of his BG into his pump  Also has been out of his Victoza  Due to lack of data can not make any changes to pump settings today  Patient will resume Victoza and put BG into pump (meal bolus and correction boluses) so adjustments can be made appropriately   Referral given for diabetes education to review current pumps has he is on animas currently  Since he can not upload pump he will have this done will he has visit with diabetes educator  Will make necessary adjustments  Stressed importance of keeping up with his appointments  Should be seen every 3-4 months  He is on intensive insulin regimen with concentrated insulin via pump which places him at increased risk for severe hypoglycemia  Hypoglycemia can lead to permanent disability and death  Relevant Medications    liraglutide (VICTOZA) injection    insulin regular (HUMULIN R) 500 units/mL CONCENTRATED injection    Other Relevant Orders    Hemoglobin A1C    Comprehensive metabolic panel    Microalbumin / creatinine urine ratio    T4, free    TSH, 3rd generation    Ambulatory referral to Diabetic Education    Thyroid nodule     Has multiple thyroid nodules  No US since April 2017  Have ordered repeat US for continued surveillance  Relevant Orders    US thyroid       Other    Mixed hyperlipidemia     Continue statin, check fasting lipid panel         Relevant Orders    Lipid Panel with Direct LDL reflex      Other Visit Diagnoses     Type 1 diabetes mellitus without complication (HCC)        Relevant Medications    liraglutide (VICTOZA) injection    insulin regular (HUMULIN R) 500 units/mL CONCENTRATED injection          Orders Placed This Encounter   Procedures    US thyroid     Standing Status:   Future     Standing Expiration Date:   2/19/2020     Scheduling Instructions:      No prep required  Please bring your physician order, insurance cards, a form of photo ID and a list of your medications with you  Arrive 15 minutes prior to your appointment time in order to register  To schedule appointment please call Central Scheduling at [phone]  Order Specific Question:   Reason for Exam:     Answer:   thyroid nodule    Hemoglobin A1C    Comprehensive metabolic panel     This is a patient instruction: Patient fasting for 8 hours or longer recommended      Lipid Panel with Direct LDL reflex     This is a patient instruction: This test requires patient fasting for 10-12 hours or longer  Drinking of black coffee or black tea is acceptable   Microalbumin / creatinine urine ratio    T4, free    TSH, 3rd generation     This is a patient instruction: This test is non-fasting  Please drink two glasses of water morning of bloodwork   Ambulatory referral to Diabetic Education     Standing Status:   Future     Standing Expiration Date:   8/19/2019     Referral Priority:   Routine     Referral Type:   Consult - AMB     Referral Reason:   Specialty Services Required     Requested Specialty:   Endocrinology     Number of Visits Requested:   1     Expiration Date:   2/19/2020       Patient Instructions   Resume Victoza  Referral given for diabetes educator for pump   Put all BG readings into pump  Come into office to download pump/sensor         Discussed with the patient and all questioned fully answered  He will call me if any problems arise  Follow-up appointment in 3 months       Counseled patient on diagnostic results, prognosis, risk and benefit of treatment options, instruction for management, importance of treatment compliance, Risk  factor reduction and impressions      Abeba Amaral [address] [facility]

## 2019-02-19 NOTE — PATIENT INSTRUCTIONS
Resume Victoza  Referral given for diabetes educator for pump   Put all BG readings into pump  Come into office to download pump/sensor

## 2019-02-21 NOTE — ASSESSMENT & PLAN NOTE
Has multiple thyroid nodules  No US since April 2017  Have ordered repeat US for continued surveillance

## 2019-02-21 NOTE — ASSESSMENT & PLAN NOTE
No recent A1C, he is not putting any of his BG into his pump  Also has been out of his Victoza  Due to lack of data can not make any changes to pump settings today  Patient will resume Victoza and put BG into pump (meal bolus and correction boluses) so adjustments can be made appropriately  Referral given for diabetes education to review current pumps has he is on animas currently  Since he can not upload pump he will have this done will he has visit with diabetes educator  Will make necessary adjustments  Stressed importance of keeping up with his appointments  Should be seen every 3-4 months  He is on intensive insulin regimen with concentrated insulin via pump which places him at increased risk for severe hypoglycemia  Hypoglycemia can lead to permanent disability and death

## 2019-02-27 DIAGNOSIS — I10 HYPERTENSION, UNSPECIFIED TYPE: Primary | ICD-10-CM

## 2019-02-27 DIAGNOSIS — E78.2 MIXED HYPERLIPIDEMIA: ICD-10-CM

## 2019-02-27 RX ORDER — RAMIPRIL 10 MG/1
10 CAPSULE ORAL DAILY
Qty: 90 CAPSULE | Refills: 3 | Status: SHIPPED | OUTPATIENT
Start: 2019-02-27 | End: 2020-03-04

## 2019-02-27 RX ORDER — ATORVASTATIN CALCIUM 80 MG/1
80 TABLET, FILM COATED ORAL DAILY
Qty: 90 TABLET | Refills: 3 | Status: SHIPPED | OUTPATIENT
Start: 2019-02-27 | End: 2020-03-04

## 2019-02-28 LAB
LEFT EYE DIABETIC RETINOPATHY: NORMAL
RIGHT EYE DIABETIC RETINOPATHY: NORMAL

## 2019-03-11 ENCOUNTER — OFFICE VISIT (OUTPATIENT)
Dept: DIABETES SERVICES | Facility: CLINIC | Age: 49
End: 2019-03-11
Payer: COMMERCIAL

## 2019-03-11 DIAGNOSIS — E10.65 TYPE 1 DIABETES MELLITUS WITH HYPERGLYCEMIA (HCC): ICD-10-CM

## 2019-03-11 PROCEDURE — 98961 EDU&TRN PT SLF-MGMT NQHP 2-4: CPT

## 2019-03-11 NOTE — PROGRESS NOTES
Lady Casillas was seen for pump upgrade options  He is currently on the [facility] pump and a DexCom sensor  He is still in warranty with his pump  Since he iis in [facility] his insurance would not cover another pump but he does have the option of choicing [employer] and receiving their pump at no cost   He was shown the [employer] and the OmniPod but has decided to contact [employer] for his pump and sensor  He  will notify the endo office of his choice

## 2019-05-02 DIAGNOSIS — E10.9 TYPE 1 DIABETES MELLITUS WITHOUT COMPLICATION (HCC): ICD-10-CM

## 2019-05-02 RX ORDER — INSULIN HUMAN 500 [IU]/ML
INJECTION, SOLUTION SUBCUTANEOUS
Qty: 40 ML | Refills: 0 | Status: SHIPPED | OUTPATIENT
Start: 2019-05-02 | End: 2019-05-02 | Stop reason: SDUPTHER

## 2019-05-13 ENCOUNTER — TELEPHONE (OUTPATIENT)
Dept: DIABETES SERVICES | Facility: CLINIC | Age: 49
End: 2019-05-13

## 2019-05-30 DIAGNOSIS — E10.65 TYPE 1 DIABETES MELLITUS WITH HYPERGLYCEMIA (HCC): ICD-10-CM

## 2019-07-02 ENCOUNTER — OFFICE VISIT (OUTPATIENT)
Dept: DIABETES SERVICES | Facility: CLINIC | Age: 49
End: 2019-07-02

## 2019-07-02 DIAGNOSIS — Z79.4 TYPE 2 DIABETES MELLITUS WITH HYPERGLYCEMIA, WITH LONG-TERM CURRENT USE OF INSULIN (HCC): ICD-10-CM

## 2019-07-02 DIAGNOSIS — E11.65 TYPE 2 DIABETES MELLITUS WITH HYPERGLYCEMIA, WITH LONG-TERM CURRENT USE OF INSULIN (HCC): ICD-10-CM

## 2019-07-02 PROCEDURE — MSTARU PUMP START TRAINING-UPGRADE, NEW PLATFORM

## 2019-07-02 NOTE — PATIENT INSTRUCTIONS
Change infusion set and reservoir every 3 days  Check glucose 1 - 2 hours after a set change  [place] for the [employer] account

## 2019-07-02 NOTE — PROGRESS NOTES
Vilma Jayda is here today for education on pump upgrade from [employer] to [employer]  He is using Humulin R -500 in his pump  At this session, we discussed entering basal/bolus setting, giving a bolus dose, temp basal, filling reservoir and infusion set  Education was completed as outlined on [employer] checklist  He forgot to bring in his insulin and will return tomorrow to fill and start on the Medtronic pump  Pump worksheet was entered into his chart  He did not receive the guardian sensor  I have asked him to contact [employer] and I will contact my rep to find out if he will be receiving the sensor  For now he will continue to use the DexCom sensor

## 2019-07-03 ENCOUNTER — OFFICE VISIT (OUTPATIENT)
Dept: DIABETES SERVICES | Facility: CLINIC | Age: 49
End: 2019-07-03

## 2019-07-03 DIAGNOSIS — E11.65 UNCONTROLLED TYPE 2 DIABETES MELLITUS WITH HYPERGLYCEMIA (HCC): Primary | ICD-10-CM

## 2019-07-03 NOTE — PATIENT INSTRUCTIONS
Change reservior and infusion set every 3 days        Contact pump company for any pump issues and the endo office for glucoses

## 2019-07-03 NOTE — PROGRESS NOTES
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

## 2019-07-11 DIAGNOSIS — E10.9 TYPE 1 DIABETES MELLITUS WITHOUT COMPLICATION (HCC): ICD-10-CM

## 2019-07-18 PROBLEM — Z96.41 INSULIN PUMP IN PLACE: Status: ACTIVE | Noted: 2019-07-18

## 2019-07-19 ENCOUNTER — APPOINTMENT (OUTPATIENT)
Dept: RADIOLOGY | Age: 49
End: 2019-07-19
Payer: COMMERCIAL

## 2019-07-19 ENCOUNTER — OFFICE VISIT (OUTPATIENT)
Dept: FAMILY MEDICINE CLINIC | Facility: CLINIC | Age: 49
End: 2019-07-19
Payer: COMMERCIAL

## 2019-07-19 VITALS
OXYGEN SATURATION: 98 % | WEIGHT: 284.8 LBS | BODY MASS INDEX: 39.87 KG/M2 | DIASTOLIC BLOOD PRESSURE: 82 MMHG | TEMPERATURE: 97.5 F | HEART RATE: 90 BPM | HEIGHT: 71 IN | SYSTOLIC BLOOD PRESSURE: 136 MMHG

## 2019-07-19 DIAGNOSIS — M72.2 PLANTAR FASCIITIS OF RIGHT FOOT: ICD-10-CM

## 2019-07-19 DIAGNOSIS — E10.65 TYPE 1 DIABETES MELLITUS WITH HYPERGLYCEMIA (HCC): ICD-10-CM

## 2019-07-19 DIAGNOSIS — M79.671 PAIN OF RIGHT HEEL: Primary | ICD-10-CM

## 2019-07-19 DIAGNOSIS — E66.9 OBESITY (BMI 30-39.9): ICD-10-CM

## 2019-07-19 DIAGNOSIS — M79.671 PAIN OF RIGHT HEEL: ICD-10-CM

## 2019-07-19 PROCEDURE — 73630 X-RAY EXAM OF FOOT: CPT

## 2019-07-19 PROCEDURE — 99214 OFFICE O/P EST MOD 30 MIN: CPT | Performed by: FAMILY MEDICINE

## 2019-07-19 NOTE — PROGRESS NOTES
BMI Counseling: Body mass index is 39 72 kg/m²  Discussed the patient's BMI with him  The BMI is above average  BMI counseling and education was provided to the patient  Nutrition recommendations include reducing portion sizes, decreasing overall calorie intake, 3-5 servings of fruits/vegetables daily, reducing fast food intake and reducing intake of cholesterol  Exercise recommendations include exercising 3-5 times per week  Patient's shoes and socks removed  Right Foot/Ankle   Right Foot Inspection  Skin Exam: skin normal, callus and callus skin not intact, no dry skin, no warmth, no erythema, no maceration, no abnormal color, no pre-ulcer and no ulcer                          Toe Exam: ROM and strength within normal limits  Sensory   Vibration: intact  Proprioception: intact   Monofilament testing: intact  Vascular  Capillary refills: < 3 seconds  The right DP pulse is 2+  The right PT pulse is 2+  Left Foot/Ankle  Left Foot Inspection  Skin Exam: skin normalskin not intact, no dry skin, no warmth, no erythema, no maceration, normal color, no pre-ulcer, no ulcer and no callus                         Toe Exam: ROM and strength within normal limits                   Sensory   Vibration: intact  Proprioception: intact  Monofilament: intact  Vascular  Capillary refills: < 3 seconds  The left DP pulse is 2+  The left PT pulse is 2+  Assign Risk Category:  No deformity present; No loss of protective sensation;  No weak pulses       Risk: 0

## 2019-07-19 NOTE — PATIENT INSTRUCTIONS
Hx of diabetes and has chronic right foot and heel pain and is in pain, check xray and r/o calcaneal spur, use proper shoes with good arch support  Consult Podiatry

## 2019-07-19 NOTE — PROGRESS NOTES
Assessment/Plan:  Chief Complaint   Patient presents with    Heel Pain     patient has been having right heel pain worse first thing in the morning  Patient Instructions   Hx of diabetes and has chronic right foot and heel pain and is in pain, check xray and r/o calcaneal spur, use proper shoes with good arch support  No problem-specific Assessment & Plan notes found for this encounter  Diagnoses and all orders for this visit:    Pain of right heel  -     XR foot 3+ vw right; Future    Plantar fasciitis of right foot  -     XR foot 3+ vw right; Future    Type 1 diabetes mellitus with hyperglycemia (HCC)    Obesity (BMI 30-39  9)          Subjective:      Patient ID: Christian Bernardo is a 54 y o  male  Here for right heel pain and sees endo for diabetes  Has had right heel pain and is here for evaluation, no trauma involved  The following portions of the patient's history were reviewed and updated as appropriate: allergies, current medications, past family history, past medical history, past social history, past surgical history and problem list     Review of Systems   Constitutional:        Obesity   HENT: Negative  Eyes: Negative  Respiratory: Negative  Cardiovascular: Negative  Gastrointestinal: Negative  Endocrine: Negative  Genitourinary: Negative  Musculoskeletal:        Right heel pain   Skin: Negative  Allergic/Immunologic: Negative  Neurological: Negative  Hematological: Negative  Psychiatric/Behavioral: Negative  Objective:      /82   Pulse 90   Temp 97 5 °F (36 4 °C) (Temporal)   Ht 5' 11" (1 803 m)   Wt 129 kg (284 lb 12 8 oz)   SpO2 98%   BMI 39 72 kg/m²          Physical Exam   Constitutional: He is oriented to person, place, and time  He appears well-developed and well-nourished  obesity   HENT:   Head: Normocephalic and atraumatic     Right Ear: External ear normal    Left Ear: External ear normal    Nose: Nose normal  Mouth/Throat: Oropharynx is clear and moist    Eyes: Pupils are equal, round, and reactive to light  Conjunctivae and EOM are normal    Neck: Normal range of motion  Neck supple  Cardiovascular: Normal rate, regular rhythm, normal heart sounds and intact distal pulses  Pulmonary/Chest: Effort normal and breath sounds normal    Musculoskeletal: Normal range of motion  Right heel pain with palpation   Neurological: He is alert and oriented to person, place, and time  He has normal reflexes  Skin: Skin is warm and dry  Psychiatric: He has a normal mood and affect   His behavior is normal

## 2019-07-25 ENCOUNTER — APPOINTMENT (OUTPATIENT)
Dept: LAB | Age: 49
End: 2019-07-25
Payer: COMMERCIAL

## 2019-07-25 ENCOUNTER — TELEPHONE (OUTPATIENT)
Dept: ENDOCRINOLOGY | Facility: CLINIC | Age: 49
End: 2019-07-25

## 2019-07-25 LAB
ALBUMIN SERPL BCP-MCNC: 3.7 G/DL (ref 3.5–5)
ALP SERPL-CCNC: 95 U/L (ref 46–116)
ALT SERPL W P-5'-P-CCNC: 37 U/L (ref 12–78)
ANION GAP SERPL CALCULATED.3IONS-SCNC: 3 MMOL/L (ref 4–13)
AST SERPL W P-5'-P-CCNC: 14 U/L (ref 5–45)
BILIRUB SERPL-MCNC: 0.46 MG/DL (ref 0.2–1)
BUN SERPL-MCNC: 20 MG/DL (ref 5–25)
CALCIUM SERPL-MCNC: 9.2 MG/DL (ref 8.3–10.1)
CHLORIDE SERPL-SCNC: 104 MMOL/L (ref 100–108)
CHOLEST SERPL-MCNC: 141 MG/DL (ref 50–200)
CO2 SERPL-SCNC: 31 MMOL/L (ref 21–32)
CREAT SERPL-MCNC: 0.98 MG/DL (ref 0.6–1.3)
CREAT UR-MCNC: 272 MG/DL
EST. AVERAGE GLUCOSE BLD GHB EST-MCNC: 174 MG/DL
GFR SERPL CREATININE-BSD FRML MDRD: 91 ML/MIN/1.73SQ M
GLUCOSE P FAST SERPL-MCNC: 126 MG/DL (ref 65–99)
HBA1C MFR BLD: 7.7 % (ref 4.2–6.3)
HDLC SERPL-MCNC: 33 MG/DL (ref 40–60)
LDLC SERPL CALC-MCNC: 83 MG/DL (ref 0–100)
MICROALBUMIN UR-MCNC: 145 MG/L (ref 0–20)
MICROALBUMIN/CREAT 24H UR: 53 MG/G CREATININE (ref 0–30)
POTASSIUM SERPL-SCNC: 4.2 MMOL/L (ref 3.5–5.3)
PROT SERPL-MCNC: 7.2 G/DL (ref 6.4–8.2)
SODIUM SERPL-SCNC: 138 MMOL/L (ref 136–145)
T4 FREE SERPL-MCNC: 0.84 NG/DL (ref 0.76–1.46)
TRIGL SERPL-MCNC: 124 MG/DL
TSH SERPL DL<=0.05 MIU/L-ACNC: 1.46 UIU/ML (ref 0.36–3.74)

## 2019-07-25 PROCEDURE — 84439 ASSAY OF FREE THYROXINE: CPT | Performed by: NURSE PRACTITIONER

## 2019-07-25 PROCEDURE — 80053 COMPREHEN METABOLIC PANEL: CPT | Performed by: NURSE PRACTITIONER

## 2019-07-25 PROCEDURE — 36415 COLL VENOUS BLD VENIPUNCTURE: CPT | Performed by: NURSE PRACTITIONER

## 2019-07-25 PROCEDURE — 82570 ASSAY OF URINE CREATININE: CPT | Performed by: FAMILY MEDICINE

## 2019-07-25 PROCEDURE — 3060F POS MICROALBUMINURIA REV: CPT | Performed by: FAMILY MEDICINE

## 2019-07-25 PROCEDURE — 82043 UR ALBUMIN QUANTITATIVE: CPT | Performed by: FAMILY MEDICINE

## 2019-07-25 PROCEDURE — 80061 LIPID PANEL: CPT | Performed by: NURSE PRACTITIONER

## 2019-07-25 PROCEDURE — 83036 HEMOGLOBIN GLYCOSYLATED A1C: CPT | Performed by: FAMILY MEDICINE

## 2019-07-25 PROCEDURE — 84443 ASSAY THYROID STIM HORMONE: CPT | Performed by: NURSE PRACTITIONER

## 2019-07-25 NOTE — TELEPHONE ENCOUNTER
----- Message from [place] sent at 7/25/2019  1:02 PM EDT -----  Please call the patient regarding his abnormal result   Will discuss at upcoming visit, please bring BG log/meter

## 2019-07-26 ENCOUNTER — OFFICE VISIT (OUTPATIENT)
Dept: ENDOCRINOLOGY | Facility: CLINIC | Age: 49
End: 2019-07-26
Payer: COMMERCIAL

## 2019-07-26 ENCOUNTER — OFFICE VISIT (OUTPATIENT)
Dept: PODIATRY | Facility: CLINIC | Age: 49
End: 2019-07-26
Payer: COMMERCIAL

## 2019-07-26 VITALS
WEIGHT: 280 LBS | HEART RATE: 81 BPM | BODY MASS INDEX: 39.2 KG/M2 | SYSTOLIC BLOOD PRESSURE: 140 MMHG | HEIGHT: 71 IN | DIASTOLIC BLOOD PRESSURE: 84 MMHG

## 2019-07-26 VITALS
WEIGHT: 292.2 LBS | DIASTOLIC BLOOD PRESSURE: 90 MMHG | BODY MASS INDEX: 40.75 KG/M2 | SYSTOLIC BLOOD PRESSURE: 140 MMHG | HEART RATE: 64 BPM

## 2019-07-26 DIAGNOSIS — E10.65 TYPE 1 DIABETES MELLITUS WITH HYPERGLYCEMIA (HCC): Primary | ICD-10-CM

## 2019-07-26 DIAGNOSIS — E11.65 TYPE 2 DIABETES MELLITUS WITH HYPERGLYCEMIA, WITH LONG-TERM CURRENT USE OF INSULIN (HCC): ICD-10-CM

## 2019-07-26 DIAGNOSIS — I10 BENIGN ESSENTIAL HYPERTENSION: ICD-10-CM

## 2019-07-26 DIAGNOSIS — E10.9 TYPE 1 DIABETES MELLITUS WITHOUT COMPLICATION (HCC): ICD-10-CM

## 2019-07-26 DIAGNOSIS — M72.2 PLANTAR FASCIITIS: Primary | ICD-10-CM

## 2019-07-26 DIAGNOSIS — E10.9 CONTROLLED DIABETES MELLITUS TYPE 1 WITHOUT COMPLICATIONS (HCC): ICD-10-CM

## 2019-07-26 DIAGNOSIS — E04.1 THYROID NODULE: ICD-10-CM

## 2019-07-26 DIAGNOSIS — Z96.41 INSULIN PUMP IN PLACE: ICD-10-CM

## 2019-07-26 DIAGNOSIS — Z79.4 TYPE 2 DIABETES MELLITUS WITH HYPERGLYCEMIA, WITH LONG-TERM CURRENT USE OF INSULIN (HCC): ICD-10-CM

## 2019-07-26 DIAGNOSIS — E78.2 MIXED HYPERLIPIDEMIA: ICD-10-CM

## 2019-07-26 PROCEDURE — 20550 NJX 1 TENDON SHEATH/LIGAMENT: CPT | Performed by: PODIATRIST

## 2019-07-26 PROCEDURE — 99214 OFFICE O/P EST MOD 30 MIN: CPT | Performed by: NURSE PRACTITIONER

## 2019-07-26 PROCEDURE — 99203 OFFICE O/P NEW LOW 30 MIN: CPT | Performed by: PODIATRIST

## 2019-07-26 RX ORDER — BUPIVACAINE HYDROCHLORIDE 5 MG/ML
1 INJECTION, SOLUTION EPIDURAL; INTRACAUDAL ONCE
Status: COMPLETED | OUTPATIENT
Start: 2019-07-26 | End: 2019-07-26

## 2019-07-26 RX ORDER — MELOXICAM 15 MG/1
15 TABLET ORAL DAILY
Qty: 30 TABLET | Refills: 0 | Status: SHIPPED | OUTPATIENT
Start: 2019-07-26 | End: 2019-08-22

## 2019-07-26 RX ORDER — TRIAMCINOLONE ACETONIDE 40 MG/ML
20 INJECTION, SUSPENSION INTRA-ARTICULAR; INTRAMUSCULAR ONCE
Status: COMPLETED | OUTPATIENT
Start: 2019-07-26 | End: 2019-07-26

## 2019-07-26 RX ADMIN — TRIAMCINOLONE ACETONIDE 20 MG: 40 INJECTION, SUSPENSION INTRA-ARTICULAR; INTRAMUSCULAR at 10:59

## 2019-07-26 RX ADMIN — BUPIVACAINE HYDROCHLORIDE 1 ML: 5 INJECTION, SOLUTION EPIDURAL; INTRACAUDAL at 10:59

## 2019-07-26 NOTE — PROGRESS NOTES
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  Reports complications of neuropathy and microalbuminuria  Last A1C 7 7  He is currently using Medtronic 670G with  and dexcom sensor  He will be getting Medtronic guardian sensor in November  Can only use manual mode until that time  He has not been using his [place] [employer] but reports BG were greatly improved when he was  Review of pump/sensor reports shows average BG of 212 with time in range 32%  He was above range 67% of the time  Denies recent illness or hospitalizations  Denies recent severe hypoglycemic or severe hyperglycemic episodes  Denies any issues with his current regimen  Home glucose monitoring: are performed regularly      Current Insulin pump settings:  Basal rate: 12 am 1 35, 4 am 1 45, 8 am 1 10, 6 pm 1 20  Insulin to carb ratio: 12 am 50  Insulin sensitivity factor: 12 am 150, 8 am 100, 9 pm 150  BG target: 12o  Active Insulin time: 6 hrs      Backup Plan: patient aware that in case of malfunctioning of the pump or unexplained hyperglycemia to use basal and bolus therapy as backup which is prescribed to the patient  Also notified patient to call clinic and/or pump company if any issues or go to emergency department if signs/symptoms of DKA  compliant all of the timedenies any side effects from current psychiatric medications     Hypoglycemic episodes: Yes rare   H/o of hypoglycemia causing hospitalization or Intervention such as glucagon injection or ambulance call No   Hypoglycemia symptoms: dizziness, headache and sweating   Treatment of hypoglycemia: orange juice     Last Eye Exam: 2/28/19  Last Foot Exam: does not see podiatrist     Has hypertension: Taking Ramipril   Has hyperlipidemia: Taking Atorvastatin          Has thyroid nodules: denies neck pain, dysphonia, dysphagia, or dyspnea       Patient Active Problem List   Diagnosis    Type 1 diabetes mellitus with hyperglycemia ([facility] )    Type 2 diabetes mellitus with hyperglycemia, with long-term current use of insulin (HCC)    Mixed hyperlipidemia    Thyroid nodule    Insulin pump in place    Benign essential hypertension      Past Medical History:   Diagnosis Date    [facility]     Vitamin D deficiency       Past Surgical History:   Procedure Laterality Date    NO PAST SURGERIES        Family History   Problem Relation Age of Onset    Lung cancer Mother     Cancer Maternal Grandfather     Diabetes Maternal Grandfather     Diabetes Family     Other Family         cardiac disorder    Hypertension Family      Social History     Tobacco Use    Smoking status: Current Every Day Smoker     Types: Cigarettes    Smokeless tobacco: Never Used   Substance Use Topics    Alcohol use: Yes     Comment: socially     No Known Allergies      Current Outpatient Medications:     aspirin 81 MG tablet, Take 1 tablet by mouth daily, Disp: , Rfl:     atorvastatin (LIPITOR) 80 mg tablet, Take 1 tablet (80 mg total) by mouth daily, Disp: 90 tablet, Rfl: 3    bimatoprost (LUMIGAN) 0 01 % ophthalmic drops, Apply 1 drop to eye Daily, Disp: , Rfl:     Blood Glucose Monitoring Suppl (ONE TOUCH ULTRA 2) w/Device KIT, by Does not apply route, Disp: , Rfl:     ibuprofen (MOTRIN) 200 mg tablet, Take by mouth, Disp: , Rfl:     Insulin Pen Needle (BD PEN NEEDLE NADYA U/F) 32G X 4 MM MISC, by Does not apply route daily, Disp: , Rfl:     insulin regular (HUMULIN R) 500 units/mL CONCENTRATED injection, Inject 0 6 mL (300 Units total) under the skin daily via pump daily as directed, Disp: 4 vial, Rfl: 6    Multiple Vitamins-Minerals (MULTIVITAMINS PLUS ZINC) CAPS, Take by mouth, Disp: , Rfl:     ONE TOUCH ULTRA TEST test strip, CHECK SUGARS 2 TIMES DAILY AS DIRECTED, Disp: 200 each, Rfl: 3   Vincenzo FLOREZ MISC, by Does not apply route, Disp: , Rfl:     ramipril (ALTACE) 10 MG capsule, Take 1 capsule (10 mg total) by mouth daily, Disp: 90 capsule, Rfl: 3    Cholecalciferol (VITAMIN D) 2000 units CAPS, Take by mouth, Disp: , Rfl:     Empagliflozin 25 MG TABS, Take 1 tablet (25 mg total) by mouth every morning (Patient not taking: Reported on 12/11/2018 ), Disp: 90 tablet, Rfl: 3    glucagon (GLUCAGON EMERGENCY) 1 MG injection, Inject as directed Twice daily, Disp: , Rfl:     liraglutide (VICTOZA) injection, Inject 0 3 mL (1 8 mg total) under the skin daily (Patient not taking: Reported on 7/19/2019), Disp: 27 mL, Rfl: 3    meloxicam (MOBIC) 15 mg tablet, Take 1 tablet (15 mg total) by mouth daily for 30 days (Patient not taking: Reported on 7/26/2019), Disp: 30 tablet, Rfl: 0    Review of Systems   Constitutional: Negative for activity change, appetite change and fatigue  HENT: Negative for sore throat, trouble swallowing and voice change  Eyes: Negative for visual disturbance  Respiratory: Negative for choking, chest tightness and shortness of breath  Cardiovascular: Negative for chest pain, palpitations and leg swelling  Gastrointestinal: Negative for abdominal pain, constipation and diarrhea  Endocrine: Negative for cold intolerance, heat intolerance, polydipsia, polyphagia and polyuria  Genitourinary: Negative for frequency  Musculoskeletal: Negative for arthralgias and myalgias  Skin: Negative for rash  Neurological: Negative for dizziness and syncope  Hematological: Negative for adenopathy  Psychiatric/Behavioral: Negative for sleep disturbance  All other systems reviewed and are negative  Physical Exam:  Body mass index is 40 75 kg/m²    /90   Pulse 64   Wt 133 kg (292 lb 3 2 oz)   BMI 40 75 kg/m²    Wt Readings from Last 3 Encounters:   07/26/19 133 kg (292 lb 3 2 oz)   07/26/19 127 kg (280 lb)   07/19/19 129 kg (284 lb 12 8 oz) Physical Exam   Constitutional: He is oriented to person, place, and time  He appears well-developed and well-nourished  No distress  HENT:   Head: Normocephalic and atraumatic  Mouth/Throat: Oropharynx is clear and moist    Eyes: Pupils are equal, round, and reactive to light  Conjunctivae and EOM are normal    Neck: Normal range of motion  Neck supple  No thyromegaly present  Cardiovascular: Normal rate, regular rhythm and normal heart sounds  Pulses are no weak pulses  No murmur heard  Pulses:       Dorsalis pedis pulses are 2+ on the right side, and 2+ on the left side  Pulmonary/Chest: Effort normal and breath sounds normal  No respiratory distress  He has no wheezes  He has no rales  Abdominal: Soft  Bowel sounds are normal  He exhibits no distension  There is no tenderness  Musculoskeletal: Normal range of motion  He exhibits no edema  Feet:   Right Foot:   Skin Integrity: Positive for dry skin  Negative for ulcer, skin breakdown, erythema, warmth or callus  Left Foot:   Skin Integrity: Positive for dry skin  Negative for ulcer, skin breakdown, erythema, warmth or callus  Lymphadenopathy:     He has no cervical adenopathy  Neurological: He is alert and oriented to person, place, and time  Skin: Skin is warm and dry  Psychiatric: He has a normal mood and affect  Vitals reviewed  Patient's shoes and socks removed  Right Foot/Ankle   Right Foot Inspection  Skin Exam: skin normal and dry skin skin not intact, no warmth, no callus, no erythema, no maceration, no abnormal color, no pre-ulcer, no ulcer and no callus                            Sensory       Monofilament testing: diminished  Vascular    The right DP pulse is 2+       Left Foot/Ankle  Left Foot Inspection  Skin Exam: skin normal and dry skinskin not intact, no warmth, no erythema, no maceration, normal color, no pre-ulcer, no ulcer and no callus                                         Sensory       Monofilament: diminished  Vascular    The left DP pulse is 2+  Assign Risk Category:  No deformity present; Loss of protective sensation; No weak pulses       Risk: 1      Labs:     Lab Results   Component Value Date    HGBA1C 7 7 (H) 07/25/2019       Lab Results   Component Value Date     11/06/2015    SODIUM 138 07/25/2019    K 4 2 07/25/2019     07/25/2019    CO2 31 07/25/2019    ANIONGAP 5 11/06/2015    AGAP 3 (L) 07/25/2019    BUN 20 07/25/2019    CREATININE 0 98 07/25/2019    GLUC 262 (H) 04/04/2018    GLUF 126 (H) 07/25/2019    CALCIUM 9 2 07/25/2019    AST 14 07/25/2019    ALT 37 07/25/2019    ALKPHOS 95 07/25/2019    PROT 7 2 11/06/2015    TP 7 2 07/25/2019    BILITOT 0 61 11/06/2015    TBILI 0 46 07/25/2019    EGFR 91 07/25/2019         Lab Results   Component Value Date    CHOL 148 08/04/2015    HDL 33 (L) 07/25/2019    TRIG 124 07/25/2019       Lab Results   Component Value Date    UEN5DDMKHEMX 1 460 07/25/2019    ECV0XGJRRBJT 1 580 04/04/2018    VOI1KAFQLCBD 1 990 07/31/2017     Lab Results   Component Value Date    FREET4 0 84 07/25/2019       Impression & Plan:    Problem List Items Addressed This Visit        Endocrine    Type 1 diabetes mellitus with hyperglycemia ([facility] ) - Primary     Close to goal, BG should improve once patient is able to obtain guardian sensor and use automode  Resume Victoza  BG overall able target range  Increase basal rates as follows; 12 am 1 60, 4 am 1 70, 8 am 1 20, 6 pm 1 40  Upload pump/sensor report in two weeks for review  Focus on dietary and lifestyle modifications  He is on intensive insulin regimen with concentrated insulin via pump which places him at increased risk for severe hypoglycemia  Hypoglycemia can lead to permanent disability and death              Relevant Medications    insulin regular (HUMULIN R) 500 units/mL CONCENTRATED injection    Other Relevant Orders    Hemoglobin A1C    Comprehensive metabolic panel    Lipid Panel with Direct LDL reflex    US thyroid    Microalbumin / creatinine urine ratio    Type 2 diabetes mellitus with hyperglycemia, with long-term current use of insulin (HCC)    Relevant Medications    insulin regular (HUMULIN R) 500 units/mL CONCENTRATED injection    Thyroid nodule     Stressed importance of completing US for continued surveillance           Relevant Orders    US thyroid       Cardiovascular and Mediastinum    Benign essential hypertension     BP stable, continue current regimen             Other    Mixed hyperlipidemia     Stable, continue statin          Relevant Orders    Comprehensive metabolic panel    Insulin pump in place      Other Visit Diagnoses     Type 1 diabetes mellitus without complication (HCC)        Relevant Medications    insulin regular (HUMULIN R) 500 units/mL CONCENTRATED injection          Orders Placed This Encounter   Procedures    US thyroid     Standing Status:   Future     Standing Expiration Date:   7/26/2020     Scheduling Instructions:      No prep required  Please bring your physician order, insurance cards, a form of photo ID and a list of your medications with you  Arrive 15 minutes prior to your appointment time in order to register  To schedule appointment please call Central Scheduling at [phone]  Order Specific Question:   Reason for Exam:     Answer:   thyroid nodule    Hemoglobin A1C     Standing Status:   Future     Standing Expiration Date:   10/26/2019    Comprehensive metabolic panel     This is a patient instruction: Patient fasting for 8 hours or longer recommended  Standing Status:   Future     Standing Expiration Date:   7/26/2020    Lipid Panel with Direct LDL reflex     This is a patient instruction: This test requires patient fasting for 10-12 hours or longer  Drinking of black coffee or black tea is acceptable       Standing Status:   Future     Standing Expiration Date:   7/26/2020    Microalbumin / creatinine urine ratio     Standing Status:   Future Standing Expiration Date:   7/26/2020       Patient Instructions   Set up account with [email or web address], contact office with username and password        Discussed with the patient and all questioned fully answered  He will call me if any problems arise  Follow-up appointment in 3 months       Counseled patient on diagnostic results, prognosis, risk and benefit of treatment options, instruction for management, importance of treatment compliance, Risk  factor reduction and impressions      Abeba Amaral [address]

## 2019-07-29 NOTE — ASSESSMENT & PLAN NOTE
Close to goal, BG should improve once patient is able to obtain guardian sensor and use automode  Resume Victoza  BG overall able target range  Increase basal rates as follows; 12 am 1 60, 4 am 1 70, 8 am 1 20, 6 pm 1 40  Upload pump/sensor report in two weeks for review  Focus on dietary and lifestyle modifications  He is on intensive insulin regimen with concentrated insulin via pump which places him at increased risk for severe hypoglycemia  Hypoglycemia can lead to permanent disability and death

## 2019-08-22 ENCOUNTER — OFFICE VISIT (OUTPATIENT)
Dept: PODIATRY | Facility: CLINIC | Age: 49
End: 2019-08-22
Payer: COMMERCIAL

## 2019-08-22 VITALS
HEIGHT: 71 IN | WEIGHT: 287.2 LBS | DIASTOLIC BLOOD PRESSURE: 78 MMHG | HEART RATE: 79 BPM | BODY MASS INDEX: 40.21 KG/M2 | SYSTOLIC BLOOD PRESSURE: 148 MMHG

## 2019-08-22 DIAGNOSIS — M72.2 PLANTAR FASCIITIS: Primary | ICD-10-CM

## 2019-08-22 PROCEDURE — 20550 NJX 1 TENDON SHEATH/LIGAMENT: CPT | Performed by: PODIATRIST

## 2019-08-22 RX ORDER — MELOXICAM 15 MG/1
15 TABLET ORAL DAILY
Qty: 30 TABLET | Refills: 0 | Status: SHIPPED | OUTPATIENT
Start: 2019-08-22 | End: 2020-03-21 | Stop reason: SDUPTHER

## 2019-08-22 RX ORDER — TRIAMCINOLONE ACETONIDE 40 MG/ML
20 INJECTION, SUSPENSION INTRA-ARTICULAR; INTRAMUSCULAR ONCE
Status: COMPLETED | OUTPATIENT
Start: 2019-08-22 | End: 2019-08-22

## 2019-08-22 RX ORDER — LIDOCAINE HYDROCHLORIDE 10 MG/ML
1 INJECTION, SOLUTION INFILTRATION; PERINEURAL ONCE
Status: COMPLETED | OUTPATIENT
Start: 2019-08-22 | End: 2019-08-22

## 2019-08-22 RX ORDER — BUPIVACAINE HYDROCHLORIDE 5 MG/ML
1 INJECTION, SOLUTION EPIDURAL; INTRACAUDAL ONCE
Status: COMPLETED | OUTPATIENT
Start: 2019-08-22 | End: 2019-08-22

## 2019-08-22 RX ADMIN — LIDOCAINE HYDROCHLORIDE 1 ML: 10 INJECTION, SOLUTION INFILTRATION; PERINEURAL at 10:59

## 2019-08-22 RX ADMIN — TRIAMCINOLONE ACETONIDE 20 MG: 40 INJECTION, SUSPENSION INTRA-ARTICULAR; INTRAMUSCULAR at 10:58

## 2019-08-22 RX ADMIN — BUPIVACAINE HYDROCHLORIDE 1 ML: 5 INJECTION, SOLUTION EPIDURAL; INTRACAUDAL at 10:59

## 2019-08-22 NOTE — PROGRESS NOTES
Patient presents for assessment of right heel  70% improvement related  Patient taking meloxicam without adverse effect  Patient desires an additional injection  Injected right heel with 0 5 cc Kenalog 40 along with 1 cc 1% xylocaine and 1 cc 0 5% Marcaine  Patient is rescheduled in 1 month  Patient to continue with meloxicam       Foot injection     Date/Time 8/22/2019 11:00 AM     Performed by  Ronnell Bobby DPM     Authorized by Ronnell Bobby DPM      Universal Protocol Consent: Verbal consent obtained  Risks and benefits: risks, benefits and alternatives were discussed  Consent given by: patient  Patient understanding: patient states understanding of the procedure being performed  Patient identity confirmed: verbally with patient        Preparation: Patient was prepped and draped in the usual sterile fashion  Site preparation: Isopropyl alcohol    Local anesthesia used: yes     Anesthesia   Local anesthesia used: yes  Local Anesthetic: lidocaine 1% without epinephrine and bupivacaine 0 5% without epinephrine     Procedure Details   Procedure Notes: Injected right heel with 0 5 cc Kenalog 40 along with 1 cc 1% xylocaine and 1 cc 0 5% Marcaine

## 2019-09-19 ENCOUNTER — OFFICE VISIT (OUTPATIENT)
Dept: PODIATRY | Facility: CLINIC | Age: 49
End: 2019-09-19
Payer: COMMERCIAL

## 2019-09-19 VITALS
BODY MASS INDEX: 40.32 KG/M2 | SYSTOLIC BLOOD PRESSURE: 137 MMHG | HEIGHT: 71 IN | HEART RATE: 90 BPM | WEIGHT: 288 LBS | DIASTOLIC BLOOD PRESSURE: 83 MMHG

## 2019-09-19 DIAGNOSIS — M72.2 PLANTAR FASCIITIS: Primary | ICD-10-CM

## 2019-09-19 PROCEDURE — 99213 OFFICE O/P EST LOW 20 MIN: CPT | Performed by: PODIATRIST

## 2019-09-19 RX ORDER — DIFLUNISAL 500 MG/1
500 TABLET, FILM COATED ORAL 2 TIMES DAILY
Qty: 60 TABLET | Refills: 1 | Status: SHIPPED | OUTPATIENT
Start: 2019-09-19 | End: 2020-02-17

## 2019-09-19 NOTE — PROGRESS NOTES
Assessment/Plan:    Patient advised to discontinue meloxicam   He was placed on diflunisal 500 mg b i d  With 1 refill  He will be rescheduled in 6 weeks  I prefer to hold off on 30 injection if possible and current symptoms do not  require an injection  No problem-specific Assessment & Plan notes found for this encounter  Diagnoses and all orders for this visit:    Plantar fasciitis  -     diflunisal (DOLOBID) 500 mg tablet; Take 1 tablet (500 mg total) by mouth 2 (two) times a day          Subjective:      Patient ID: Doreen Varela is a 54 y o  male  HPI  Patient presents for assessment of right heel  Patient is still having discomfort but he rates his pain as only a 3/10  Originally it was a 9/10  Patient has had 2 injections and has been on meloxicam since his 1st visit  The following portions of the patient's history were reviewed and updated as appropriate: allergies, current medications, past family history, past medical history, past social history, past surgical history and problem list     Review of Systems   Cardiovascular: Negative  Gastrointestinal: Negative  Musculoskeletal: Negative  Neurological: Negative  Objective:      /83   Pulse 90   Ht 5' 11" (1 803 m) Comment: verbal  Wt 131 kg (288 lb)   BMI 40 17 kg/m²          Physical Exam   Cardiovascular: Regular rhythm and intact distal pulses  Musculoskeletal: He exhibits tenderness  Mild pain with palpation medial heel right foot   Neurological: No sensory deficit  He exhibits normal muscle tone  Skin: Skin is warm  No pallor

## 2019-10-01 ENCOUNTER — OFFICE VISIT (OUTPATIENT)
Dept: PODIATRY | Facility: CLINIC | Age: 49
End: 2019-10-01
Payer: COMMERCIAL

## 2019-10-01 VITALS
BODY MASS INDEX: 39.9 KG/M2 | WEIGHT: 285 LBS | HEART RATE: 85 BPM | HEIGHT: 71 IN | DIASTOLIC BLOOD PRESSURE: 81 MMHG | SYSTOLIC BLOOD PRESSURE: 130 MMHG

## 2019-10-01 DIAGNOSIS — M72.2 PLANTAR FASCIITIS: Primary | ICD-10-CM

## 2019-10-01 PROCEDURE — 20552 NJX 1/MLT TRIGGER POINT 1/2: CPT | Performed by: PODIATRIST

## 2019-10-01 RX ORDER — MELOXICAM 15 MG/1
15 TABLET ORAL DAILY
Qty: 30 TABLET | Refills: 2 | Status: SHIPPED | OUTPATIENT
Start: 2019-10-01 | End: 2019-11-27 | Stop reason: SDUPTHER

## 2019-10-01 RX ORDER — LIDOCAINE HYDROCHLORIDE 10 MG/ML
1 INJECTION, SOLUTION INFILTRATION; PERINEURAL ONCE
Status: COMPLETED | OUTPATIENT
Start: 2019-10-01 | End: 2019-10-01

## 2019-10-01 RX ORDER — TRIAMCINOLONE ACETONIDE 40 MG/ML
20 INJECTION, SUSPENSION INTRA-ARTICULAR; INTRAMUSCULAR ONCE
Status: COMPLETED | OUTPATIENT
Start: 2019-10-01 | End: 2019-10-01

## 2019-10-01 RX ORDER — BUPIVACAINE HYDROCHLORIDE 5 MG/ML
1 INJECTION, SOLUTION EPIDURAL; INTRACAUDAL ONCE
Status: COMPLETED | OUTPATIENT
Start: 2019-10-01 | End: 2019-10-01

## 2019-10-01 RX ADMIN — LIDOCAINE HYDROCHLORIDE 1 ML: 10 INJECTION, SOLUTION INFILTRATION; PERINEURAL at 16:19

## 2019-10-01 RX ADMIN — TRIAMCINOLONE ACETONIDE 20 MG: 40 INJECTION, SUSPENSION INTRA-ARTICULAR; INTRAMUSCULAR at 16:19

## 2019-10-01 RX ADMIN — BUPIVACAINE HYDROCHLORIDE 1 ML: 5 INJECTION, SOLUTION EPIDURAL; INTRACAUDAL at 16:18

## 2019-10-01 NOTE — PROGRESS NOTES
Patient presents prematurely for due to recurrence of severe right heel pain  Patient had been doing better but was unable to take the Diflucan is Cell to GI upset and discontinued  The right heel has recur again flared  He has had 2 cortisone injections at this time  On exam, pain with palpation medial and central aspect right heel at fascia insertion into the calcaneus  Treatment:  Injected trigger point with 0 5 cc Kenalog 40 along with 1 cc 1% xylocaine and 1 cc 0 5% Marcaine  Again prescribed meloxicam 15 mg daily  Dispense size 13 pure stride insoles  Foot injection     Date/Time 10/1/2019 4:25 PM     Performed by  Aruna Delgadillo DPM     Authorized by Aruna Delgadillo DPM      [place] Protocol Consent: Verbal consent obtained  Consent given by: patient  Patient understanding: patient states understanding of the procedure being performed  Patient identity confirmed: verbally with patient        Site preparation: Isopropyl alcohol    Local anesthesia used: yes     Anesthesia   Local anesthesia used: yes  Local Anesthetic: lidocaine 1% without epinephrine and bupivacaine 0 5% without epinephrine     Procedure Details   Procedure Notes: Injected right heel trigger point with 0 5 cc Kenalog 40 along with 1 cc 1% xylocaine and 1 cc 0 5% Marcaine

## 2019-11-14 ENCOUNTER — OFFICE VISIT (OUTPATIENT)
Dept: PODIATRY | Facility: CLINIC | Age: 49
End: 2019-11-14
Payer: COMMERCIAL

## 2019-11-14 VITALS
SYSTOLIC BLOOD PRESSURE: 123 MMHG | HEART RATE: 90 BPM | DIASTOLIC BLOOD PRESSURE: 76 MMHG | WEIGHT: 280 LBS | HEIGHT: 71 IN | BODY MASS INDEX: 39.2 KG/M2

## 2019-11-14 DIAGNOSIS — M72.2 PLANTAR FASCIITIS: Primary | ICD-10-CM

## 2019-11-14 DIAGNOSIS — M79.671 RIGHT FOOT PAIN: ICD-10-CM

## 2019-11-14 PROCEDURE — 99212 OFFICE O/P EST SF 10 MIN: CPT | Performed by: PODIATRIST

## 2019-11-14 NOTE — PROGRESS NOTES
Patient presents for assessment of right heel  Patient relates minimal discomfort at this time  He responded well to a series of cortisone injections  He is also taking meloxicam   It is noted that there is a red discoloration at the site of the cortisone injections  Explained to patient that this is a reaction to the cortisone  No need for additional  injections at this time  Patient to try to wean off the meloxicam   No additional treatment needed  Sadie valenzuela

## 2019-11-21 ENCOUNTER — TELEPHONE (OUTPATIENT)
Dept: ENDOCRINOLOGY | Facility: CLINIC | Age: 49
End: 2019-11-21

## 2019-11-22 ENCOUNTER — TELEPHONE (OUTPATIENT)
Dept: ENDOCRINOLOGY | Facility: CLINIC | Age: 49
End: 2019-11-22

## 2019-11-22 NOTE — TELEPHONE ENCOUNTER
Spoke to  [facility]abetes [facility] and told them I didn't get a DME recently so they will be refaxing

## 2019-11-26 NOTE — PROGRESS NOTES
Established Patient Progress Note      Chief Complaint   Patient presents with    Diabetes Type 2          History of Present Illness:   Jocelyn Morgan is a 54 y o  male with a history of HLD, thyroid nodule, and double diabetes with long term use of insulin  Reports complications of neuropathy and microalbuminuria  Last A1C 7 7  He is currently using Medtronic 670G with dexcom sensor  Is still under warranty with dexcom, is in process of getting guardian sensor mid December  Review of pump sensor report shows BG very labile  Over treats low BG and goes low after high correction  He often overeats during work day to prevent going low  Average BG on dexcom ws 197 with time in range 39%  He is not entering any BG data into his pump  Denies recent illness or hospitalizations  Denies recent severe hypoglycemic or severe hyperglycemic episodes  Denies any issues with his current regimen  Home glucose monitoring: are performed regularly 4x per day  Patient is on a Medtronic 670G pump prescribed by endocrinologist  He has been on a pump for over 5 years  He denies any malfunctioning of the pump  Current Insulin pump settings:  Basal rate: 12 am 1 60, 4 am 1 70, 8 am 1 20, 6 pm 1 40  Insulin to carb ratio: 50  Insulin sensitivity factor: 12 am 150, 8 am 100, 9 pm 150  BG target: 120  Active Insulin time: 6 hrs    Type of insulin:      Backup Plan: patient aware that in case of malfunctioning of the pump or unexplained hyperglycemia to use basal and bolus therapy as backup which is prescribed to the patient  Also notified patient to call clinic and/or pump company if any issues or go to emergency department if signs/symptoms of DKA  compliant all of the timedenies any side effects from current medications     Hypoglycemic episodes: Yes frequent   H/o of hypoglycemia causing hospitalization or Intervention such as glucagon injection or ambulance call No   Hypoglycemia symptoms: dizziness, headache and sweating   Treatment of hypoglycemia: glucose tablets     Last Eye Exam: 2/28/19  Last Foot Exam: 7/26/19    Has hypertension: Taking Ramipril   Has hyperlipidemia: Taking Atorvastatin          Has thyroid nodules: denies neck pain, dysphonia, dysphagia, or dyspnea    Patient Active Problem List   Diagnosis    [facility]    Type 2 diabetes mellitus with hyperglycemia, with long-term current use of insulin (HCC)    Mixed hyperlipidemia    Thyroid nodule    Insulin pump in place    Benign essential hypertension      Past Medical History:   Diagnosis Date    [facility]     Vitamin D deficiency       Past Surgical History:   Procedure Laterality Date    NO PAST SURGERIES        Family History   Problem Relation Age of Onset    Lung cancer Mother     Cancer Maternal Grandfather     Diabetes Maternal Grandfather     Diabetes Family     Other Family         cardiac disorder    Hypertension Family      Social History     Tobacco Use    Smoking status: Current Every Day Smoker     Types: Cigarettes    Smokeless tobacco: Never Used   Substance Use Topics    Alcohol use: Yes     Comment: socially     No Known Allergies      Current Outpatient Medications:     aspirin 81 MG tablet, Take 1 tablet by mouth daily, Disp: , Rfl:     atorvastatin (LIPITOR) 80 mg tablet, Take 1 tablet (80 mg total) by mouth daily, Disp: 90 tablet, Rfl: 3    bimatoprost (LUMIGAN) 0 01 % ophthalmic drops, Apply 1 drop to eye Daily, Disp: , Rfl:     Blood Glucose Monitoring Suppl (ONE TOUCH ULTRA 2) w/Device KIT, by Does not apply route, Disp: , Rfl:     Cholecalciferol (VITAMIN D) 2000 units CAPS, Take by mouth, Disp: , Rfl:     glucagon (GLUCAGON EMERGENCY) 1 MG injection, Inject as directed Twice daily, Disp: , Rfl:     ibuprofen (MOTRIN) 200 mg tablet, Take by mouth, Disp: , Rfl:     Insulin Pen Needle (BD PEN NEEDLE NADYA U/F) 32G X 4 MM MISC, by Does not apply route daily, Disp: , Rfl:     insulin regular (HUMULIN R) 500 units/mL CONCENTRATED injection, Inject 0 6 mL (300 Units total) under the skin daily via pump daily as directed, Disp: 4 vial, Rfl: 6    meloxicam (MOBIC) 15 mg tablet, Take 1 tablet (15 mg total) by mouth daily for 30 days, Disp: 30 tablet, Rfl: 0    Multiple Vitamins-Minerals (MULTIVITAMINS PLUS ZINC) CAPS, Take by mouth, Disp: , Rfl:     ONE TOUCH ULTRA TEST test strip, CHECK SUGARS 2 TIMES DAILY AS DIRECTED, Disp: 200 each, Rfl: 3    ONETOUCH DELICA LANCETS FINE MISC, by Does not apply route, Disp: , Rfl:     ramipril (ALTACE) 10 MG capsule, Take 1 capsule (10 mg total) by mouth daily, Disp: 90 capsule, Rfl: 3    diflunisal (DOLOBID) 500 mg tablet, Take 1 tablet (500 mg total) by mouth 2 (two) times a day (Patient not taking: Reported on 10/1/2019), Disp: 60 tablet, Rfl: 1    Empagliflozin 25 MG TABS, Take 1 tablet (25 mg total) by mouth every morning (Patient not taking: Reported on 12/11/2018 ), Disp: 90 tablet, Rfl: 3    liraglutide (VICTOZA) injection, Inject 0 3 mL (1 8 mg total) under the skin daily (Patient not taking: Reported on 11/27/2019), Disp: 27 mL, Rfl: 3    Review of Systems   Constitutional: Negative for activity change, appetite change and fatigue  HENT: Negative for sore throat, trouble swallowing and voice change  Eyes: Negative for visual disturbance  Respiratory: Negative for choking, chest tightness and shortness of breath  Cardiovascular: Negative for chest pain, palpitations and leg swelling  Gastrointestinal: Negative for abdominal pain, constipation and diarrhea  Endocrine: Negative for cold intolerance, heat intolerance, polydipsia, polyphagia and polyuria  Genitourinary: Negative for frequency  Musculoskeletal: Negative for arthralgias and myalgias  Skin: Negative for rash  Neurological: Negative for dizziness and syncope  Hematological: Negative for adenopathy     Psychiatric/Behavioral: Negative for sleep disturbance  All other systems reviewed and are negative  Physical Exam:  Body mass index is 41 45 kg/m²  /70   Pulse 86   Ht 5' 11" (1 803 m)   Wt 135 kg (297 lb 3 2 oz)   BMI 41 45 kg/m²    Wt Readings from Last 3 Encounters:   11/27/19 135 kg (297 lb 3 2 oz)   11/14/19 127 kg (280 lb)   10/01/19 129 kg (285 lb)       Physical Exam   Constitutional: He is oriented to person, place, and time  He appears well-developed and well-nourished  No distress  HENT:   Head: Normocephalic and atraumatic  Mouth/Throat: Oropharynx is clear and moist    Eyes: Pupils are equal, round, and reactive to light  Conjunctivae and EOM are normal    Neck: Normal range of motion  Neck supple  No thyromegaly present  Cardiovascular: Normal rate, regular rhythm and normal heart sounds  No murmur heard  Pulmonary/Chest: Effort normal and breath sounds normal  No respiratory distress  He has no wheezes  He has no rales  Abdominal: Soft  Bowel sounds are normal  He exhibits no distension  There is no tenderness  Musculoskeletal: Normal range of motion  He exhibits no edema  Lymphadenopathy:     He has no cervical adenopathy  Neurological: He is alert and oriented to person, place, and time  Skin: Skin is warm and dry  Psychiatric: He has a normal mood and affect  Vitals reviewed          Labs:     Lab Results   Component Value Date    HGBA1C 7 7 (H) 07/25/2019       Lab Results   Component Value Date     11/06/2015    SODIUM 138 07/25/2019    K 4 2 07/25/2019     07/25/2019    CO2 31 07/25/2019    ANIONGAP 5 11/06/2015    AGAP 3 (L) 07/25/2019    BUN 20 07/25/2019    CREATININE 0 98 07/25/2019    GLUC 262 (H) 04/04/2018    GLUF 126 (H) 07/25/2019    CALCIUM 9 2 07/25/2019    AST 14 07/25/2019    ALT 37 07/25/2019    ALKPHOS 95 07/25/2019    PROT 7 2 11/06/2015    TP 7 2 07/25/2019    BILITOT 0 61 11/06/2015    TBILI 0 46 07/25/2019    EGFR 91 07/25/2019         Lab Results   Component Value Date    CHOL 148 08/04/2015    HDL 33 (L) 07/25/2019    TRIG 124 07/25/2019       Lab Results   Component Value Date    IFH7EYTIYYJO 1 460 07/25/2019    MNB9YBQECDPE 1 580 04/04/2018    KDP4IRWBITBO 1 990 07/31/2017     Lab Results   Component Value Date    FREET4 0 84 07/25/2019       Impression & Plan:    Problem List Items Addressed This Visit        Endocrine    Type 1 diabetes mellitus with hyperglycemia ([facility] ) - Primary     Uncontrolled/poorly controlled with episodes of hypo and hyperglycemia  Should improve once patient receives his guardian sensor and is able to use automode  For now change correction factor at 8 am to 150  Change basal rate at 8 am to 1 15 and 3 pm to 1 20  Upload pump/sensor report in two weeks for review  Advised on proper treatment of hypoglycemia  He will notify office of BG less than 70  Relevant Orders    Hemoglobin A1C    Comprehensive metabolic panel    Lipid Panel with Direct LDL reflex    Microalbumin / creatinine urine ratio    T4, free    TSH, 3rd generation    Type 2 diabetes mellitus with hyperglycemia, with long-term current use of insulin (HCC)    Relevant Orders    Hemoglobin A1C    Comprehensive metabolic panel    Lipid Panel with Direct LDL reflex    Microalbumin / creatinine urine ratio    T4, free    TSH, 3rd generation    Thyroid nodule     Overdue for thyroid US  Have again stressed importance of having this completed           Relevant Orders    US thyroid       Cardiovascular and Mediastinum    Benign essential hypertension     BP stable, continue current regimen         Relevant Orders    Comprehensive metabolic panel       Other    Mixed hyperlipidemia     Continue statin, check fasting lipid panel         Relevant Orders    Lipid Panel with Direct LDL reflex    Insulin pump in place          Orders Placed This Encounter   Procedures    US thyroid     Standing Status:   Future     Standing Expiration Date:   11/27/2023     Scheduling Instructions: No prep required  Please bring your insurance cards, a form of photo ID and a list of your medications with you  Arrive 15 minutes prior to your appointment time in order to register  To schedule this appointment, please contact Central Scheduling at [phone]   Hemoglobin A1C    Comprehensive metabolic panel     This is a patient instruction: Patient fasting for 8 hours or longer recommended   Lipid Panel with Direct LDL reflex     This is a patient instruction: This test requires patient fasting for 10-12 hours or longer  Drinking of black coffee or black tea is acceptable   Microalbumin / creatinine urine ratio    T4, free    TSH, 3rd generation     This is a patient instruction: This test is non-fasting  Please drink two glasses of water morning of bloodwork  Patient Instructions     Hypoglycemia in a Person with Diabetes   AMBULATORY CARE:   Hypoglycemia  is a serious condition that happens when your blood glucose (sugar) level drops too low  The blood sugar level is usually too high in a person with diabetes, but the level can also drop too low  It is important to follow your diabetes management plan to keep your blood sugar level steady  Common signs and symptoms include the following:   · Headache, hunger, or nervousness     · Trouble thinking or moodiness     · Sweating, or a pounding heartbeat     · Forgetfulness, confusion, or double vision     · Weakness or trouble walking     · Numbness and tingling in your fingers or around your mouth     · Seizures or loss of consciousness  Call 911 for any of the following:   · You have a seizure or pass out  · You feel you are going to pass out  · You have trouble thinking clearly  Seek care immediately if:  · Your blood sugar is less than 50 mg/dL and does not respond to treatment  Contact your healthcare provider if:   · You have had symptoms of low blood sugar several times       · You have questions about the amount of insulin or diabetes medicine you are taking  · You have questions or concerns about your condition or care  Manage hypoglycemia:   · Check your blood sugar level right away if you have symptoms of hypoglycemia  Hypoglycemia is usually 70 mg/dL or below  Ask your healthcare provider what blood sugar level is too low for you  · If your blood sugar level is too low, eat or drink 15 grams of fast-acting carbohydrate  Examples of this amount of fast-acting carbohydrate are 4 ounces (½ cup) of fruit juice or 4 ounces of regular soda  Other examples are 2 tablespoons of raisins or 3 to 4 glucose tablets  Check your blood sugar level 15 minutes later  If the level is still low (less than 100 mg/dL), have another 15 grams of carbohydrate  When the level returns to 100 mg/dL, eat a snack or meal that contains carbohydrates  This will help prevent another drop in blood sugar  Always carefully follow your healthcare provider's instructions on how to treat low blood sugar levels  · Always carry a source of fast-acting carbohydrate  If you have symptoms of hypoglycemia and you do not have a blood glucose meter, have a source of fast-acting carbohydrate anyway  Avoid carbohydrate foods that are high in fat  The fat content may make it take longer to increase your blood sugar level  Ask your healthcare provider if you should carry a glucagon kit  Glucagon is a medicine that is injected when you develop severe hypoglycemia and become unconscious  Check the expiration date every month and replace it before it expires  · Teach others how to help you if you have symptoms of hypoglycemia  Tell them about the symptoms of hypoglycemia  Ask them to give you a source of fast-acting carbohydrate if you cannot get it yourself  Ask them to give you a glucagon injection if you have symptoms of hypoglycemia and you become unconscious or have a seizure  Ask them to call 911   This is an emergency   Tell them never to try to make you swallow anything if you faint or have a seizure  · Wear medical alert jewelry  or carry a card that says you have diabetes  Ask where to get these items  Prevent hypoglycemia:   · Take diabetes medicine as directed  Take your medicine at the right time and in the right amount  Your healthcare provider may change your blood sugar goals if you get hypoglycemia often  · Eat regular meals and snacks  Talk to your dietitian or healthcare provider about a meal plan that is right for you  Do not skip meals  · Check your blood sugar level as directed  Ask your healthcare provider what your blood sugar levels should be before and after you eat  Ask when and how often to check your blood sugar level  You may need to check at least 3 times each day  Record your blood sugar level results and take the record with you when you see your healthcare provider  Your provider may use the record to make changes to your medicine, food, or exercise schedules  · Check your blood sugar level before you exercise  Exercise can decrease your blood sugar level  If your blood sugar level is less than 100 mg/dL, have a carbohydrate snack  Examples are 4 to 6 crackers, ½ banana, 8 ounces (1 cup) of nonfat or 1% milk, or 4 ounces (½ cup) of juice  If you will exercise for more than 1 hour, you may need to check your blood sugar level every 30 minutes  Your healthcare provider may also recommend that you check your blood sugar level after exercise  · Be aware of how alcohol affects your blood sugar level  Alcohol can cause your blood sugar level to drop for up to 12 hours after drinking  Ask your healthcare provider if alcohol is safe for you  If you drink alcohol, always have a snack or meal at the same time  Women should limit alcohol to 1 drink a day  Men should limit alcohol to 2 drinks a day  A drink of alcohol is 12 ounces of beer, 5 ounces of wine, or 1½ ounces of liquor    Follow up with your healthcare provider as directed: You may need dose changes to your insulin or oral diabetes medicine if you have hypoglycemia  Write down your questions so you remember to ask them during your visits  © 2017 [employer] Information is for End User's use only and may not be sold, redistributed or otherwise used for commercial purposes  All illustrations and images included in CareNotes® are the copyrighted property of [employer]  or Tushar Saldaña  The above information is an  only  It is not intended as medical advice for individual conditions or treatments  Talk to your doctor, nurse or pharmacist before following any medical regimen to see if it is safe and effective for you  Discussed with the patient and all questioned fully answered  He will call me if any problems arise  Follow-up appointment in 3 months       Counseled patient on diagnostic results, prognosis, risk and benefit of treatment options, instruction for management, importance of treatment compliance, Risk  factor reduction and impressions      Abeba Fatou [facility]

## 2019-11-27 ENCOUNTER — OFFICE VISIT (OUTPATIENT)
Dept: ENDOCRINOLOGY | Facility: CLINIC | Age: 49
End: 2019-11-27
Payer: COMMERCIAL

## 2019-11-27 VITALS
HEART RATE: 86 BPM | SYSTOLIC BLOOD PRESSURE: 120 MMHG | BODY MASS INDEX: 41.61 KG/M2 | HEIGHT: 71 IN | WEIGHT: 297.2 LBS | DIASTOLIC BLOOD PRESSURE: 70 MMHG

## 2019-11-27 DIAGNOSIS — E04.1 THYROID NODULE: ICD-10-CM

## 2019-11-27 DIAGNOSIS — I10 BENIGN ESSENTIAL HYPERTENSION: ICD-10-CM

## 2019-11-27 DIAGNOSIS — E10.65 TYPE 1 DIABETES MELLITUS WITH HYPERGLYCEMIA (HCC): Primary | ICD-10-CM

## 2019-11-27 DIAGNOSIS — Z96.41 INSULIN PUMP IN PLACE: ICD-10-CM

## 2019-11-27 DIAGNOSIS — Z79.4 TYPE 2 DIABETES MELLITUS WITH HYPERGLYCEMIA, WITH LONG-TERM CURRENT USE OF INSULIN (HCC): ICD-10-CM

## 2019-11-27 DIAGNOSIS — E78.2 MIXED HYPERLIPIDEMIA: ICD-10-CM

## 2019-11-27 DIAGNOSIS — E11.65 TYPE 2 DIABETES MELLITUS WITH HYPERGLYCEMIA, WITH LONG-TERM CURRENT USE OF INSULIN (HCC): ICD-10-CM

## 2019-11-27 PROCEDURE — 99215 OFFICE O/P EST HI 40 MIN: CPT | Performed by: NURSE PRACTITIONER

## 2019-11-27 NOTE — ASSESSMENT & PLAN NOTE
Uncontrolled/poorly controlled with episodes of hypo and hyperglycemia  Should improve once patient receives his guardian sensor and is able to use automode  For now change correction factor at 8 am to 150  Change basal rate at 8 am to 1 15 and 3 pm to 1 20  Upload pump/sensor report in two weeks for review  Advised on proper treatment of hypoglycemia  He will notify office of BG less than 70

## 2019-11-27 NOTE — PATIENT INSTRUCTIONS
Hypoglycemia in a Person with Diabetes   AMBULATORY CARE:   Hypoglycemia  is a serious condition that happens when your blood glucose (sugar) level drops too low  The blood sugar level is usually too high in a person with diabetes, but the level can also drop too low  It is important to follow your diabetes management plan to keep your blood sugar level steady  Common signs and symptoms include the following:   · Headache, hunger, or nervousness     · Trouble thinking or moodiness     · Sweating, or a pounding heartbeat     · Forgetfulness, confusion, or double vision     · Weakness or trouble walking     · Numbness and tingling in your fingers or around your mouth     · Seizures or loss of consciousness  Call 911 for any of the following:   · You have a seizure or pass out  · You feel you are going to pass out  · You have trouble thinking clearly  Seek care immediately if:  · Your blood sugar is less than 50 mg/dL and does not respond to treatment  Contact your healthcare provider if:   · You have had symptoms of low blood sugar several times  · You have questions about the amount of insulin or diabetes medicine you are taking  · You have questions or concerns about your condition or care  Manage hypoglycemia:   · Check your blood sugar level right away if you have symptoms of hypoglycemia  Hypoglycemia is usually 70 mg/dL or below  Ask your healthcare provider what blood sugar level is too low for you  · If your blood sugar level is too low, eat or drink 15 grams of fast-acting carbohydrate  Examples of this amount of fast-acting carbohydrate are 4 ounces (½ cup) of fruit juice or 4 ounces of regular soda  Other examples are 2 tablespoons of raisins or 3 to 4 glucose tablets  Check your blood sugar level 15 minutes later  If the level is still low (less than 100 mg/dL), have another 15 grams of carbohydrate   When the level returns to 100 mg/dL, eat a snack or meal that contains carbohydrates  This will help prevent another drop in blood sugar  Always carefully follow your healthcare provider's instructions on how to treat low blood sugar levels  · Always carry a source of fast-acting carbohydrate  If you have symptoms of hypoglycemia and you do not have a blood glucose meter, have a source of fast-acting carbohydrate anyway  Avoid carbohydrate foods that are high in fat  The fat content may make it take longer to increase your blood sugar level  Ask your healthcare provider if you should carry a glucagon kit  Glucagon is a medicine that is injected when you develop severe hypoglycemia and become unconscious  Check the expiration date every month and replace it before it expires  · Teach others how to help you if you have symptoms of hypoglycemia  Tell them about the symptoms of hypoglycemia  Ask them to give you a source of fast-acting carbohydrate if you cannot get it yourself  Ask them to give you a glucagon injection if you have symptoms of hypoglycemia and you become unconscious or have a seizure  Ask them to call 911   This is an emergency  Tell them never to try to make you swallow anything if you faint or have a seizure  · Wear medical alert jewelry  or carry a card that says you have diabetes  Ask where to get these items  Prevent hypoglycemia:   · Take diabetes medicine as directed  Take your medicine at the right time and in the right amount  Your healthcare provider may change your blood sugar goals if you get hypoglycemia often  · Eat regular meals and snacks  Talk to your dietitian or healthcare provider about a meal plan that is right for you  Do not skip meals  · Check your blood sugar level as directed  Ask your healthcare provider what your blood sugar levels should be before and after you eat  Ask when and how often to check your blood sugar level  You may need to check at least 3 times each day   Record your blood sugar level results and take the record with you when you see your healthcare provider  Your provider may use the record to make changes to your medicine, food, or exercise schedules  · Check your blood sugar level before you exercise  Exercise can decrease your blood sugar level  If your blood sugar level is less than 100 mg/dL, have a carbohydrate snack  Examples are 4 to 6 crackers, ½ banana, 8 ounces (1 cup) of nonfat or 1% milk, or 4 ounces (½ cup) of juice  If you will exercise for more than 1 hour, you may need to check your blood sugar level every 30 minutes  Your healthcare provider may also recommend that you check your blood sugar level after exercise  · Be aware of how alcohol affects your blood sugar level  Alcohol can cause your blood sugar level to drop for up to 12 hours after drinking  Ask your healthcare provider if alcohol is safe for you  If you drink alcohol, always have a snack or meal at the same time  Women should limit alcohol to 1 drink a day  Men should limit alcohol to 2 drinks a day  A drink of alcohol is 12 ounces of beer, 5 ounces of wine, or 1½ ounces of liquor  Follow up with your healthcare provider as directed: You may need dose changes to your insulin or oral diabetes medicine if you have hypoglycemia  Write down your questions so you remember to ask them during your visits  © 2017 [employer] Information is for End User's use only and may not be sold, redistributed or otherwise used for commercial purposes  All illustrations and images included in CareNotes® are the copyrighted property of [employer]  or Tushar Saldaña  The above information is an  only  It is not intended as medical advice for individual conditions or treatments  Talk to your doctor, nurse or pharmacist before following any medical regimen to see if it is safe and effective for you

## 2019-12-11 ENCOUNTER — TELEPHONE (OUTPATIENT)
Dept: ENDOCRINOLOGY | Facility: CLINIC | Age: 49
End: 2019-12-11

## 2019-12-11 DIAGNOSIS — E10.65 TYPE 1 DIABETES MELLITUS WITH HYPERGLYCEMIA (HCC): Primary | ICD-10-CM

## 2019-12-11 NOTE — TELEPHONE ENCOUNTER
Spoke with pt and told him he needs a C-peptide along with his other blood work    I placed an order for same and he is planning on getting this done around sugey time

## 2019-12-13 DIAGNOSIS — E10.65 TYPE 1 DIABETES MELLITUS WITH HYPERGLYCEMIA (HCC): Primary | ICD-10-CM

## 2020-02-06 ENCOUNTER — OFFICE VISIT (OUTPATIENT)
Dept: PODIATRY | Facility: CLINIC | Age: 50
End: 2020-02-06
Payer: COMMERCIAL

## 2020-02-06 VITALS
HEIGHT: 71 IN | SYSTOLIC BLOOD PRESSURE: 147 MMHG | BODY MASS INDEX: 41.3 KG/M2 | DIASTOLIC BLOOD PRESSURE: 89 MMHG | WEIGHT: 295 LBS | HEART RATE: 84 BPM

## 2020-02-06 DIAGNOSIS — M72.2 PLANTAR FASCIITIS: Primary | ICD-10-CM

## 2020-02-06 DIAGNOSIS — M79.671 RIGHT FOOT PAIN: ICD-10-CM

## 2020-02-06 PROCEDURE — 20550 NJX 1 TENDON SHEATH/LIGAMENT: CPT | Performed by: PODIATRIST

## 2020-02-06 RX ORDER — TRIAMCINOLONE ACETONIDE 40 MG/ML
20 INJECTION, SUSPENSION INTRA-ARTICULAR; INTRAMUSCULAR ONCE
Status: COMPLETED | OUTPATIENT
Start: 2020-02-06 | End: 2020-02-06

## 2020-02-06 RX ORDER — LIDOCAINE HYDROCHLORIDE 10 MG/ML
1 INJECTION, SOLUTION EPIDURAL; INFILTRATION; INTRACAUDAL; PERINEURAL ONCE
Status: COMPLETED | OUTPATIENT
Start: 2020-02-06 | End: 2020-02-06

## 2020-02-06 RX ORDER — BUPIVACAINE HYDROCHLORIDE 5 MG/ML
1 INJECTION, SOLUTION EPIDURAL; INTRACAUDAL ONCE
Status: COMPLETED | OUTPATIENT
Start: 2020-02-06 | End: 2020-02-06

## 2020-02-06 RX ADMIN — BUPIVACAINE HYDROCHLORIDE 1 ML: 5 INJECTION, SOLUTION EPIDURAL; INTRACAUDAL at 11:49

## 2020-02-06 RX ADMIN — TRIAMCINOLONE ACETONIDE 20 MG: 40 INJECTION, SUSPENSION INTRA-ARTICULAR; INTRAMUSCULAR at 11:42

## 2020-02-06 RX ADMIN — LIDOCAINE HYDROCHLORIDE 1 ML: 10 INJECTION, SOLUTION EPIDURAL; INFILTRATION; INTRACAUDAL; PERINEURAL at 11:43

## 2020-02-06 NOTE — PROGRESS NOTES
Patient presents with recurrence of right heel pain secondary to plantar fasciitis  Patient had been doing well but recently the heel again flared  He desires another injection  This would be his 4th  On exam, pain with palpation medial aspect right heel at fascia insertion into the calcaneus  Treatment:  Reinjected right heel with 0 5 cc Kenalog 40 along with 1 cc 1% xylocaine and 1 cc 0 5% Marcaine  Physical therapy may be needed in the future  Patient considering endoscopic fasciotomy in the summer months if pain persists  Foot injection     Date/Time 2/6/2020 11:44 AM     Performed by  Nara John DPM     Authorized by Nara John DPM      Universal Protocol Consent: Verbal consent obtained  Written consent not obtained  Risks and benefits: risks, benefits and alternatives were discussed  Consent given by: patient  Patient understanding: patient states understanding of the procedure being performed        Site preparation: Isopropyl alcohol    Local anesthesia used: yes     Anesthesia   Local anesthesia used: yes  Local Anesthetic: lidocaine 1% without epinephrine and bupivacaine 0 5% without epinephrine     Procedure Details   Procedure Notes: Injected right heel with 0 5 cc Kenalog 40 along with 1 cc 1% xylocaine and 1 cc 0 5% Marcaine

## 2020-02-17 ENCOUNTER — OFFICE VISIT (OUTPATIENT)
Dept: FAMILY MEDICINE CLINIC | Facility: CLINIC | Age: 50
End: 2020-02-17
Payer: COMMERCIAL

## 2020-02-17 VITALS
SYSTOLIC BLOOD PRESSURE: 144 MMHG | OXYGEN SATURATION: 99 % | WEIGHT: 294.4 LBS | HEIGHT: 71 IN | DIASTOLIC BLOOD PRESSURE: 70 MMHG | HEART RATE: 100 BPM | BODY MASS INDEX: 41.22 KG/M2

## 2020-02-17 DIAGNOSIS — M99.03 SOMATIC DYSFUNCTION OF LUMBAR REGION: Primary | ICD-10-CM

## 2020-02-17 PROBLEM — Z79.4 TYPE 2 DIABETES MELLITUS WITH HYPERGLYCEMIA, WITH LONG-TERM CURRENT USE OF INSULIN (HCC): Status: RESOLVED | Noted: 2018-04-04 | Resolved: 2020-02-17

## 2020-02-17 PROBLEM — E11.65 TYPE 2 DIABETES MELLITUS WITH HYPERGLYCEMIA, WITH LONG-TERM CURRENT USE OF INSULIN (HCC): Status: RESOLVED | Noted: 2018-04-04 | Resolved: 2020-02-17

## 2020-02-17 PROCEDURE — 99213 OFFICE O/P EST LOW 20 MIN: CPT | Performed by: NURSE PRACTITIONER

## 2020-02-17 PROCEDURE — 3077F SYST BP >= 140 MM HG: CPT | Performed by: NURSE PRACTITIONER

## 2020-02-17 PROCEDURE — 1036F TOBACCO NON-USER: CPT | Performed by: NURSE PRACTITIONER

## 2020-02-17 PROCEDURE — 3008F BODY MASS INDEX DOCD: CPT | Performed by: NURSE PRACTITIONER

## 2020-02-17 PROCEDURE — 3078F DIAST BP <80 MM HG: CPT | Performed by: NURSE PRACTITIONER

## 2020-02-17 RX ORDER — METHOCARBAMOL 750 MG/1
750 TABLET, FILM COATED ORAL 3 TIMES DAILY PRN
Qty: 21 TABLET | Refills: 0 | Status: SHIPPED | OUTPATIENT
Start: 2020-02-17 | End: 2020-02-24 | Stop reason: SDUPTHER

## 2020-02-17 RX ORDER — PREDNISONE 10 MG/1
TABLET ORAL
Qty: 21 TABLET | Refills: 0 | Status: SHIPPED | OUTPATIENT
Start: 2020-02-17 | End: 2020-02-24

## 2020-02-17 NOTE — PATIENT INSTRUCTIONS
Start prednisone, this is the steroid  It will be 6 pills today and decrease by 1 pill each day for the following 6 days  So it will be 6-5-4-3-2-1  Take this with food as it may upset your stomach  It's best to take it earlier in the day otherwise it may keep you up at night  Do not take this with NSAIDs such as advil/ibuprofen/advil/aleve/motrin  Monitor sugars closely while on prednisone  Call for elevated readings  Start muscle relaxant, this is one pill every 8 hours as needed, please do not drink or drive on this medication as it may make you drowsy  Do lower back stretching as tolerated  Please call the office if you are experiencing any worsening of symptoms or no symptom improvement  Lower Back Exercises   AMBULATORY CARE:   Lower back exercises  help heal and strengthen your back muscles to prevent another injury  Ask your healthcare provider if you need to see a physical therapist for more advanced exercises  Seek care immediately if:   · You have severe pain that prevents you from moving  Contact your healthcare provider if:   · Your pain becomes worse  · You have new pain  · You have questions or concerns about your condition or care  Do lower back exercises safely:   · Do the exercises on a mat or firm surface  (not on a bed) to support your spine and prevent low back pain  · Move slowly and smoothly  Avoid fast or jerky motions  · Breathe normally  Do not hold your breath  · Stop if you feel pain  It is normal to feel some discomfort at first  Regular exercise will help decrease your discomfort over time  Lower back exercises: Your healthcare provider may recommend that you do back exercises 10 to 30 minutes each day  He may also recommend that you do exercises 1 to 3 times each day  Ask your healthcare provider which exercises are best for you and how often to do them  · Ankle pumps:  Lie on your back   Move your foot up (with your toes pointing toward your head)  Then, move your foot down (with your toes pointing away from you)  Repeat this exercise 10 times on each side  · Heel slides:  Lie on your back  Slowly bend one leg and then straighten it  Next, bend the other leg and then straighten it  Repeat 10 times on each side  · Pelvic tilt:  Lie on your back with your knees bent and feet flat on the floor  Place your arms in a relaxed position beside your body  Tighten the muscles of your abdomen and flatten your back against the floor  Hold for 5 seconds  Repeat 5 times  · Back stretch:  Lie on your back with your hands behind your head  Bend your knees and turn the lower half of your body to one side  Hold this position for 10 seconds  Repeat 3 times on each side  · Straight leg raises:  Lie on your back with one leg straight  Bend the other knee  Tighten your abdomen and then slowly lift the straight leg up about 6 to 12 inches off the floor  Hold for 1 to 5 seconds  Lower your leg slowly  Repeat 10 times on each leg  · Knee-to-chest:  Lie on your back with your knees bent and feet flat on the floor  Pull one of your knees toward your chest and hold it there for 5 seconds  Return your leg to the starting position  Lift the other knee toward your chest and hold for 5 seconds  Do this 5 times on each side  · Cat and camel:  Place your hands and knees on the floor  Arch your back upward toward the ceiling and lower your head  Round out your spine as much as you can  Hold for 5 seconds  Lift your head upward and push your chest downward toward the floor  Hold for 5 seconds  Do 3 sets or as directed  · Wall squats:  Stand with your back against a wall  Tighten the muscles of your abdomen  Slowly lower your body until your knees are bent at a 45 degree angle  Hold this position for 5 seconds  Slowly move back up to a standing position  Repeat 10 times             · Curl up:  Lie on your back with your knees bent and feet flat on the floor  Place your hands, palms down, underneath the curve in your lower back  Next, with your elbows on the floor, lift your shoulders and chest 2 to 3 inches  Keep your head in line with your shoulders  Hold this position for 5 seconds  When you can do this exercise without pain for 10 to 15 seconds, you may add a rotation  While your shoulders and chest are lifted off the ground, turn slightly to the left and hold  Repeat on the other side  · Bird dog:  Place your hands and knees on the floor  Keep your wrists directly below your shoulders and your knees directly below your hips  Pull your belly button in toward your spine  Do not flatten or arch your back  Tighten your abdominal muscles  Raise one arm straight out so that it is aligned with your head  Next, raise the leg opposite your arm  Hold this position for 15 seconds  Lower your arm and leg slowly and change sides  Do 5 sets  © 2017 [employer] Information is for End User's use only and may not be sold, redistributed or otherwise used for commercial purposes  All illustrations and images included in CareNotes® are the copyrighted property of [employer] , [employer]  or Tushar Saldaña  The above information is an  only  It is not intended as medical advice for individual conditions or treatments  Talk to your doctor, nurse or pharmacist before following any medical regimen to see if it is safe and effective for you

## 2020-02-17 NOTE — PROGRESS NOTES
Assessment/Plan:    Somatic dysfunction of lumbar region  Start prednisone, this is the steroid  It will be 6 pills today and decrease by 1 pill each day for the following 6 days  So it will be 6-5-4-3-2-1  Take this with food as it may upset your stomach  It's best to take it earlier in the day otherwise it may keep you up at night  Do not take this with NSAIDs such as advil/ibuprofen/advil/aleve/motrin  Monitor sugars closely while on prednisone  Call for elevated readings  Start muscle relaxant, this is one pill every 8 hours as needed, please do not drink or drive on this medication as it may make you drowsy  Do lower back stretching as tolerated  Please call the office if you are experiencing any worsening of symptoms or no symptom improvement  PT referral given as well  If no improvement pain management would be next option  Patient verbalizes understand and agrees with treatment plan  Diagnoses and all orders for this visit:    Somatic dysfunction of lumbar region  -     predniSONE 10 mg tablet; Day 1: 6 tabs  Day 2: 5 tabs Day 3: 4 tabs  Day 4: 3 tabs  Day 5: 2 tabs  Day 6: 1 tab  -     methocarbamol (ROBAXIN) 750 mg tablet; Take 1 tablet (750 mg total) by mouth 3 (three) times a day as needed for muscle spasms  -     Ambulatory referral to Physical Therapy; Future                Subjective:        Patient ID: [id number] Weinberg is a 54 y o  male  Chief Complaint   Patient presents with    Back Pain     patient states that he has a hx of herniated dics  states that pain is in lower back and states that it recently started about 2 weeks ago  states that pain goes down leg  pain scale: 9        Here for evaluation of back pain  Has gone years since last flare  Did tweak it while lifting, then it flared more with sneezing a lot during a day, and then with lifting wood outside  In the past has done PT and medicine  Surgery was offered but he refused   His friend is PT and gave him exercises to complete  He's been taking 600 mg Advil TID/QID, this helps pain but doesn't go away  Has radiating pain to right leg  Pain in back located around right SI joint  Has inversion table which helps temporarily while laying down  Denies any changes in bladder or bowel function  Denies any changes in sensation  The following portions of the patient's history were reviewed and updated as appropriate: allergies, current medications, past family history, past social history and problem list     Review of Systems   Constitutional: Negative for chills and fever  Eyes: Negative for discharge  Respiratory: Negative for shortness of breath  Cardiovascular: Negative for chest pain  Gastrointestinal: Negative for constipation and diarrhea  Genitourinary: Negative for difficulty urinating  Musculoskeletal: Positive for back pain  Negative for joint swelling  Skin: Negative for rash  Neurological: Negative for headaches  Hematological: Negative for adenopathy  Psychiatric/Behavioral: The patient is not nervous/anxious  Objective:  /70 (BP Location: Right arm, Patient Position: Sitting, Cuff Size: Large)   Pulse 100   Ht 5' 11" (1 803 m)   Wt 134 kg (294 lb 6 4 oz)   SpO2 99%   BMI 41 06 kg/m²      Physical Exam   Constitutional: He is oriented to person, place, and time  He appears well-developed  No distress  Morbidly obese   HENT:   Head: Normocephalic and atraumatic  Right Ear: External ear normal    Left Ear: External ear normal    Eyes: Conjunctivae and lids are normal  Right eye exhibits no discharge  Left eye exhibits no discharge  Neck: Normal range of motion  Neck supple  Cardiovascular: Normal rate and regular rhythm  No murmur heard  Pulmonary/Chest: Effort normal and breath sounds normal  No respiratory distress  He has no wheezes  Musculoskeletal: He exhibits no deformity  Lumbar back: He exhibits decreased range of motion  He exhibits no tenderness  Back:    +5 upper and lower extremity strength bilaterally   Limited ROM with back extension/flexion  Twisting motion normal    Neurological: He is alert and oriented to person, place, and time  Coordination normal    Skin: Skin is warm and dry  He is not diaphoretic  Psychiatric: He has a normal mood and affect  His speech is normal and behavior is normal  Judgment and thought content normal  Cognition and memory are normal    Nursing note and vitals reviewed               Current Outpatient Medications:     aspirin 81 MG tablet, Take 1 tablet by mouth daily, Disp: , Rfl:     atorvastatin (LIPITOR) 80 mg tablet, Take 1 tablet (80 mg total) by mouth daily, Disp: 90 tablet, Rfl: 3    bimatoprost (LUMIGAN) 0 01 % ophthalmic drops, Apply 1 drop to eye Daily, Disp: , Rfl:     Blood Glucose Monitoring Suppl (ONE TOUCH ULTRA 2) w/Device KIT, by Does not apply route, Disp: , Rfl:     Cholecalciferol (VITAMIN D) 2000 units CAPS, Take by mouth, Disp: , Rfl:     glucagon (GLUCAGON EMERGENCY) 1 MG injection, Inject as directed Twice daily, Disp: , Rfl:     ibuprofen (MOTRIN) 200 mg tablet, Take by mouth, Disp: , Rfl:     Insulin Pen Needle (BD PEN NEEDLE NADYA U/F) 32G X 4 MM MISC, by Does not apply route daily, Disp: , Rfl:     insulin regular (HUMULIN R) 500 units/mL CONCENTRATED injection, Inject 0 6 mL (300 Units total) under the skin daily via pump daily as directed, Disp: 4 vial, Rfl: 6    Multiple Vitamins-Minerals (MULTIVITAMINS PLUS ZINC) CAPS, Take by mouth, Disp: , Rfl:     ONE TOUCH ULTRA TEST test strip, CHECK SUGARS 2 TIMES DAILY AS DIRECTED, Disp: 200 each, Rfl: 3    ONETOUCH DELICA LANCETS FINE MISC, by Does not apply route, Disp: , Rfl:     ramipril (ALTACE) 10 MG capsule, Take 1 capsule (10 mg total) by mouth daily, Disp: 90 capsule, Rfl: 3    Empagliflozin 25 MG TABS, Take 1 tablet (25 mg total) by mouth every morning (Patient not taking: Reported on 12/11/2018 ), Disp: 90 tablet, Rfl: 3   liraglutide (VICTOZA) injection, Inject 0 3 mL (1 8 mg total) under the skin daily (Patient not taking: Reported on 11/27/2019), Disp: 27 mL, Rfl: 3    meloxicam (MOBIC) 15 mg tablet, Take 1 tablet (15 mg total) by mouth daily for 30 days, Disp: 30 tablet, Rfl: 0    methocarbamol (ROBAXIN) 750 mg tablet, Take 1 tablet (750 mg total) by mouth 3 (three) times a day as needed for muscle spasms, Disp: 21 tablet, Rfl: 0    predniSONE 10 mg tablet, Day 1: 6 tabs  Day 2: 5 tabs Day 3: 4 tabs  Day 4: 3 tabs  Day 5: 2 tabs  Day 6: 1 tab, Disp: 21 tablet, Rfl: 0  No Known Allergies

## 2020-02-24 ENCOUNTER — TELEPHONE (OUTPATIENT)
Dept: FAMILY MEDICINE CLINIC | Facility: CLINIC | Age: 50
End: 2020-02-24

## 2020-02-24 DIAGNOSIS — M99.03 SOMATIC DYSFUNCTION OF LUMBAR REGION: ICD-10-CM

## 2020-02-24 RX ORDER — PREDNISONE 10 MG/1
TABLET ORAL
Qty: 42 TABLET | Refills: 0 | Status: SHIPPED | OUTPATIENT
Start: 2020-02-24 | End: 2020-08-12 | Stop reason: ALTCHOICE

## 2020-02-24 RX ORDER — METHOCARBAMOL 750 MG/1
750 TABLET, FILM COATED ORAL 3 TIMES DAILY PRN
Qty: 21 TABLET | Refills: 0 | Status: SHIPPED | OUTPATIENT
Start: 2020-02-24 | End: 2020-08-12

## 2020-02-24 NOTE — TELEPHONE ENCOUNTER
We could do a longer and stronger dose of prednisone if he's interested, other option is seeing pain management or even both

## 2020-02-24 NOTE — TELEPHONE ENCOUNTER
Zhen Bardales, pt called the office he was seen last week for back issues pt stated he was prescribed two medications, but his back pain has returned  Per pt the steroid worked for a day  Pt has finished the prednisone and he has 2-3 pills left of the methocarbamol  Pt was asking if you can please refill and or prescribe something else? Pt's number is [phone]       Pt's local pharmacy is the [address] in [place]

## 2020-02-24 NOTE — TELEPHONE ENCOUNTER
Patient stated he wants the higher dose of prednisone and if its possible his methocarbamol can get refilled as well  He is willing to go to pain management

## 2020-02-24 NOTE — TELEPHONE ENCOUNTER
I sent over new dose of prednisone  This is double what he did before  This is 9-4-1-5-9-6-3-3-2-2-1-1  Same instructions/ take with food  No advil  Robaxin refilled  Pain management referral placed

## 2020-03-04 DIAGNOSIS — I10 HYPERTENSION, UNSPECIFIED TYPE: ICD-10-CM

## 2020-03-04 DIAGNOSIS — E78.2 MIXED HYPERLIPIDEMIA: ICD-10-CM

## 2020-03-04 RX ORDER — RAMIPRIL 10 MG/1
CAPSULE ORAL
Qty: 90 CAPSULE | Refills: 0 | Status: SHIPPED | OUTPATIENT
Start: 2020-03-04 | End: 2020-05-29

## 2020-03-04 RX ORDER — ATORVASTATIN CALCIUM 80 MG/1
TABLET, FILM COATED ORAL
Qty: 90 TABLET | Refills: 0 | Status: SHIPPED | OUTPATIENT
Start: 2020-03-04 | End: 2020-05-29

## 2020-03-04 RX ORDER — RAMIPRIL 10 MG/1
10 CAPSULE ORAL DAILY
Qty: 90 CAPSULE | Refills: 0 | Status: CANCELLED | OUTPATIENT
Start: 2020-03-04

## 2020-03-21 ENCOUNTER — AMB VIDEO VISIT (OUTPATIENT)
Dept: URGENT CARE | Facility: CLINIC | Age: 50
End: 2020-03-21

## 2020-03-21 DIAGNOSIS — M72.2 PLANTAR FASCIITIS: ICD-10-CM

## 2020-03-21 RX ORDER — MELOXICAM 15 MG/1
15 TABLET ORAL DAILY
Qty: 30 TABLET | Refills: 0 | Status: SHIPPED | OUTPATIENT
Start: 2020-03-21 | End: 2020-12-23

## 2020-03-21 NOTE — PROGRESS NOTES
Video Visit - Vicky Oliveira 54 y o  male MRN: [id number]    REQUIRED DOCUMENTATION:       There were no vitals filed for this visit  1  This service was provided via [facility]  2  Provider located at [place] [address]  [address] [place] [address] [address]  [phone]  3  [facility] provider: Laly Gordon PA-C   4  Identify all parties in room with patient during [facility] visit:  Alex  5  After connecting through [employer], patient was identified by name and date of birth  Patient was then informed that this was a Telemedicine visit and that the exam was being conducted confidentially over secure lines  My office door was closed  No one else was in the room  Patient acknowledged consent and understanding of privacy and security of the Telemedicine visit  I informed the patient that I have reviewed their record in Epic and presented the opportunity for them to ask any questions regarding the visit today  The patient agreed to participate  74-year-old male complains of back pain for last several weeks  He was seen and had 2 courses of prednisone  The prednisone helped but it is says since come back since he stopped the prednisone  He is diabetic  He has been taking methocarbamol without relief  No other medicines over-the-counter for this  He even tried some old codeine he had which did not help  He has pain when he lays down to sleep  Pain into his buttocks but not down his leg  No numbness or tingling  No bowel or bladder changes  No history of back surgery  He has an appointment with a back doctor in April  Physical Exam   Constitutional: He appears well-developed and well-nourished  No distress  Musculoskeletal:     Patient is ambulating while I am talking to him  He denies pain when he presses on his back  He has pain when he presses on his buttock pain radiating down the right leg       Diagnoses and all orders for this visit:    Plantar fasciitis  -     Ambulatory Referral to Comprehensive Spine Program; Future  -     meloxicam (MOBIC) 15 mg tablet; Take 1 tablet (15 mg total) by mouth daily      There are no Patient Instructions on file for this visit  Follow up with PCP if not improved, if symptoms are worse, go to the ER

## 2020-03-21 NOTE — PATIENT INSTRUCTIONS
We will restart Mobic which she has had in the past   I do not when he is prednisone again due to his diabetes  No nerve pain    I put in a referral to comprehensive spine may need further workup

## 2020-03-21 NOTE — CARE ANYWHERE EVISITS
Visit Summary for Prisca Desouza - Gender: Male - Date of Birth: [id number]  Date: [id number] - Duration: 6 minutes  Patient: Prisca Desouza  Provider: Narinder Lester PA-C    Patient Contact Information  Address  [address]; [address] [facility] [address] [facility]  [phone]    Visit Topics  Back pain [Added By: Self - 2020-03-21]    Triage Questions   Have you had any international travel in the last 14 days? Answer [No]  Have you had any exposure to a known or expected Covid-19 patient in the last 14 days? Answer [No]  Do you have any immune system compromise or chronic lung disease? Answer [No]  Do you have any vulnerable family members in the home (infant, pregnant, cancer, elderly)? Answer [No]     Conversation Transcripts  [0A][0A] [Notification] You are connected with Narinder Lester PA-C, Urgent Care Specialist [0A][Notification] TRISTIN LERMA is located in [place]  [0A][Notification] TRISTIN LERMA has shared health history  Milagros Chand  [0A]    Diagnosis    Procedures  Value: [id number] Code: CPT-4 UNLISTED E&M SERVICE    Medications Prescribed    No prescriptions ordered    Electronically signed by: Didier [facility](NPI [id number])

## 2020-03-23 ENCOUNTER — NURSE TRIAGE (OUTPATIENT)
Dept: PHYSICAL THERAPY | Facility: OTHER | Age: 50
End: 2020-03-23

## 2020-03-23 DIAGNOSIS — M54.50 ACUTE EXACERBATION OF CHRONIC LOW BACK PAIN: Primary | ICD-10-CM

## 2020-03-23 DIAGNOSIS — G89.29 ACUTE EXACERBATION OF CHRONIC LOW BACK PAIN: Primary | ICD-10-CM

## 2020-03-23 NOTE — TELEPHONE ENCOUNTER
Additional Information   Negative: Is this related to a work injury?  Negative: Is this related to an MVA?  Negative: Are you currently recieving homecare services?  Negative: Has the patient had unexplained weight loss?  Negative: Does the patient have a fever?  Negative: Is the patient experiencing blood in sputum?  Negative: Is the patient experiencing urine retention?  Negative: Is the patient experiencing acute drop foot or paralysis?  Negative: Has the patient experienced major trauma? (fall from height, high speed collision, direct blow to spine) and is also experiencing nausea, light-headedness, or loss of consciousness?  Negative: Is this a chronic condition? Background - Initial Assessment  Clinical complaint: acute lower back pain right sided down the right leg into the calf  States numbness In the right leg  Patient states he does have a history of lower back pain off and on for approx  13 yrs  Patient states he has completed prednisone tapering doses x 3  Patient has seen PCP for this pain and has an appt with Dr Julian Villalobos and Pain office in April  Date of onset: feb 2020 current episode  History of back pain for approx  13 yrs  Frequency of pain: constant  Quality of pain: aching, sharp, shooting and stabbing    Protocols used: [facility] COMPREHENSIVE SPINE PROGRAM PROTOCOL    This RN did review in detail the Comprehensive Spine Program and what we can provide for their back pain  Patient is agreeable to being triaged by this RN and would like to proceed with Physical Therapy  Referral was placed for Physical Therapy at the [facility]  Patients information was sent to the  to make evaluation appointment  Patient made aware that the PT office  will be calling to schedule the appointment  Patient made aware per protocol, a referral would be entered to [facility] based on the triage intake assessment questions   The goal is to take care of patient's emotional well-being in addition to the physical well-being  Patient aware that someone from [address] [place] [address] will reaching out to them with a phone call to discuss options and services if needed  Patient verbalized understanding of referral     Behavioral referral was sent and the patient is aware that they will be receiving a phone call from that office  No further questions and/or concerns were voiced by the patient at this time  Patient states understanding of the referrals that were placed

## 2020-03-24 ENCOUNTER — TELEPHONE (OUTPATIENT)
Dept: PHYSICAL THERAPY | Facility: REHABILITATION | Age: 50
End: 2020-03-24

## 2020-03-24 NOTE — TELEPHONE ENCOUNTER
----- Message from Yair Copeland RN sent at 3/23/2020  1:01 PM EDT -----  [facility] [address]  Patient is acute exacerbation of chronic lower back pain with right side sciatica  Patient was informed that your office will be calling him to schedule his appointment  Thank You  wine

## 2020-03-25 ENCOUNTER — TELEPHONE (OUTPATIENT)
Dept: ENDOCRINOLOGY | Facility: CLINIC | Age: 50
End: 2020-03-25

## 2020-03-25 NOTE — TELEPHONE ENCOUNTER
Patient called because he is going to run out of pump supplies soon and they are currently on back order  He needs to know what to do with insulin dose and instructions  No current pump failure instructions  Thank you!

## 2020-03-27 ENCOUNTER — EVALUATION (OUTPATIENT)
Dept: PHYSICAL THERAPY | Facility: REHABILITATION | Age: 50
End: 2020-03-27
Payer: COMMERCIAL

## 2020-03-27 ENCOUNTER — TELEPHONE (OUTPATIENT)
Dept: ENDOCRINOLOGY | Facility: CLINIC | Age: 50
End: 2020-03-27

## 2020-03-27 VITALS — DIASTOLIC BLOOD PRESSURE: 82 MMHG | TEMPERATURE: 97.8 F | SYSTOLIC BLOOD PRESSURE: 153 MMHG

## 2020-03-27 DIAGNOSIS — G89.29 ACUTE EXACERBATION OF CHRONIC LOW BACK PAIN: ICD-10-CM

## 2020-03-27 DIAGNOSIS — M54.50 ACUTE EXACERBATION OF CHRONIC LOW BACK PAIN: ICD-10-CM

## 2020-03-27 PROCEDURE — 97140 MANUAL THERAPY 1/> REGIONS: CPT | Performed by: PHYSICAL THERAPIST

## 2020-03-27 PROCEDURE — 97162 PT EVAL MOD COMPLEX 30 MIN: CPT | Performed by: PHYSICAL THERAPIST

## 2020-03-27 PROCEDURE — 97112 NEUROMUSCULAR REEDUCATION: CPT | Performed by: PHYSICAL THERAPIST

## 2020-03-27 NOTE — PROGRESS NOTES
PT Evaluation     Today's date: 3/27/2020  Patient name: Thelma Tena  : 1970  MRN: [id number]  Referring provider: Dessie Kocher, PT  Dx:   Encounter Diagnosis     ICD-10-CM    1  Acute exacerbation of chronic low back pain M54 5 Ambulatory referral to PT spine    G89 29                   Assessment  Assessment details: Pt is a 53 yo male with an exacerbation of chronic low back pain radiating into the right buttock  He does not present with a clear directional preference thought flexion is extremely limited and his right piriformis is very tight over the sciatic nerve  He was sent home with a trial of flexion exercises and piriformis stretching along with performing a few extension activities to maintain ROM in this direction  He will benefit from therapeutic exercise and manual therapy to restore functional mobility and decrease pain  Impairments: abnormal gait, abnormal muscle tone, abnormal or restricted ROM, lacks appropriate home exercise program and poor body mechanics    Symptom irritability: highBarriers to therapy: Chronic pain  Understanding of Dx/Px/POC: excellent   Prognosis: good    Goals  STG: in 4 weeks  Increase ROM to min limit  Decrease pain to 5/10 at most  Performing HEP consistently  LTG: by discharge  Decrease pain to 2/10  RTW as   Resume exercising for fitness      Plan  Patient would benefit from: skilled physical therapy  Planned modality interventions: TENS and thermotherapy: hydrocollator packs  Planned therapy interventions: joint mobilization, manual therapy, stretching, strengthening, neuromuscular re-education and home exercise program  Frequency: 2x week  Duration in weeks: 10  Treatment plan discussed with: patient        Subjective Evaluation    History of Present Illness  Mechanism of injury: Pain increased early in March after working out, sneezing and cutting wood on several consecutive days      Pain  Current pain ratin  At best pain rating: 2  At worst pain rating: 10  Location: right low back and buttock  Quality: dull ache, sharp and discomfort  Relieving factors: medications  Aggravating factors: sitting    Patient Goals  Patient goals for therapy: decreased pain  Patient goal: security/walking, resume weight lifting, heavy activities at home  Objective     Concurrent Complaints  Positive for night pain and disturbed sleep  Negative for bladder dysfunction, bowel dysfunction and saddle (S4) numbness    Postural Observations  Seated posture: poor  Standing posture: fair  Correction of posture: has no consistent effect        Palpation   Left   No palpable tenderness to the lumbar paraspinals  Hypertonic in the lumbar paraspinals  Right   No palpable tenderness to the lumbar paraspinals  Hypertonic in the lumbar paraspinals       Additional Palpation Details  Right piriformis/sciatic nerve tender    Neurological Testing     Sensation     Lumbar   Left   Intact: light touch    Right   Intact: light touch    Reflexes   Left   Patellar (L4): normal (2+)  Achilles (S1): trace (1+)    Right   Patellar (L4): normal (2+)  Achilles (S1): trace (1+)    Active Range of Motion     Lumbar   Flexion:  Restriction level: maximal  Extension:  Restriction level: moderate    Additional Active Range of Motion Details  Side glide R WNL  L min limit    Joint Play     Hypomobile: T8, T9, T10, T11, T12, L1, L2, L3, L4, L5 and S1   Mechanical Assessment    Cervical      Thoracic    Lying extension: repeated movements  Pain intensity: better  Pain level: decreased    Lumbar    Sitting flexion: repeated movements  Pain location: no change  Pain intensity: better  Standing extension: repeated movements  Pain location: no change  Pain level: increased  Lying extension: repeated movements  Pain location: no change  Right sidegliding: repeated movements  Pain location: no change  Pain intensity:worse    Strength/Myotome Testing     Left Hip   Planes of Motion Flexion: 5    Right Hip   Planes of Motion   Flexion: 4 (pain)    Left Knee   Flexion: 5  Extension: 5    Right Knee   Flexion: 5  Extension: 5    Left Ankle/Foot   Dorsiflexion: 5  Great toe extension: 5    Right Ankle/Foot   Dorsiflexion: 5  Great toe extension: 5    Tests     Lumbar     Left   Negative slump test      Right   Positive slump test               Precautions: DM, HTN      Daily Treatment Diary     Assessment 3/27            Eval/Reval             FOTO     **     **   HEP Issued              Manuals             PA shelley L4,5                                                   Exercise Diary              TM             Piriformis stretch seated 30"x2            Seated flexion 3x10            Prone press ups 3x10            Standing ext  2x10                                                                                                                                                           Modalities

## 2020-03-27 NOTE — TELEPHONE ENCOUNTER
LM for both patient and patients wife  His last pump download was from November  Will need more recent information  If he can tell us how much insulin he puts in pump and how many days it lasts for   Or if he can send in pump download that has his TDD we can determine how much Ru500 to give off pump

## 2020-04-09 ENCOUNTER — TELEMEDICINE (OUTPATIENT)
Dept: PAIN MEDICINE | Facility: CLINIC | Age: 50
End: 2020-04-09
Payer: COMMERCIAL

## 2020-04-09 DIAGNOSIS — M54.40 LOW BACK PAIN WITH SCIATICA, SCIATICA LATERALITY UNSPECIFIED, UNSPECIFIED BACK PAIN LATERALITY, UNSPECIFIED CHRONICITY: ICD-10-CM

## 2020-04-09 DIAGNOSIS — M99.03 SOMATIC DYSFUNCTION OF LUMBAR REGION: ICD-10-CM

## 2020-04-09 DIAGNOSIS — M54.16 LUMBAR RADICULOPATHY: Primary | ICD-10-CM

## 2020-04-09 PROCEDURE — 99203 OFFICE O/P NEW LOW 30 MIN: CPT | Performed by: ANESTHESIOLOGY

## 2020-04-09 RX ORDER — GABAPENTIN 300 MG/1
300 CAPSULE ORAL 3 TIMES DAILY
Qty: 90 CAPSULE | Refills: 1 | Status: SHIPPED | OUTPATIENT
Start: 2020-04-09 | End: 2020-05-11

## 2020-04-14 DIAGNOSIS — E10.65 TYPE 1 DIABETES MELLITUS WITH HYPERGLYCEMIA (HCC): ICD-10-CM

## 2020-04-28 ENCOUNTER — TELEPHONE (OUTPATIENT)
Dept: PAIN MEDICINE | Facility: CLINIC | Age: 50
End: 2020-04-28

## 2020-05-05 ENCOUNTER — APPOINTMENT (OUTPATIENT)
Dept: RADIOLOGY | Age: 50
End: 2020-05-05
Payer: COMMERCIAL

## 2020-05-05 DIAGNOSIS — M54.16 LUMBAR RADICULOPATHY: ICD-10-CM

## 2020-05-05 PROCEDURE — 72114 X-RAY EXAM L-S SPINE BENDING: CPT

## 2020-05-07 ENCOUNTER — TELEMEDICINE (OUTPATIENT)
Dept: ENDOCRINOLOGY | Facility: CLINIC | Age: 50
End: 2020-05-07
Payer: COMMERCIAL

## 2020-05-07 DIAGNOSIS — E04.1 THYROID NODULE: ICD-10-CM

## 2020-05-07 DIAGNOSIS — E10.65 TYPE 1 DIABETES MELLITUS WITH HYPERGLYCEMIA (HCC): Primary | ICD-10-CM

## 2020-05-07 DIAGNOSIS — E78.2 MIXED HYPERLIPIDEMIA: ICD-10-CM

## 2020-05-07 DIAGNOSIS — Z96.41 INSULIN PUMP IN PLACE: ICD-10-CM

## 2020-05-07 DIAGNOSIS — I10 BENIGN ESSENTIAL HYPERTENSION: ICD-10-CM

## 2020-05-07 PROCEDURE — 99443 PR PHYS/QHP TELEPHONE EVALUATION 21-30 MIN: CPT | Performed by: NURSE PRACTITIONER

## 2020-05-07 RX ORDER — BLOOD-GLUCOSE TRANSMITTER
EACH MISCELLANEOUS
Qty: 12 EACH | Refills: 3 | Status: SHIPPED | OUTPATIENT
Start: 2020-05-07 | End: 2020-08-24 | Stop reason: SDUPTHER

## 2020-05-07 RX ORDER — BLOOD-GLUCOSE SENSOR
EACH MISCELLANEOUS
Qty: 1 EACH | Refills: 3 | Status: SHIPPED | OUTPATIENT
Start: 2020-05-07 | End: 2020-08-24 | Stop reason: SDUPTHER

## 2020-05-11 ENCOUNTER — TELEPHONE (OUTPATIENT)
Dept: DIABETES SERVICES | Facility: CLINIC | Age: 50
End: 2020-05-11

## 2020-05-11 ENCOUNTER — TELEPHONE (OUTPATIENT)
Dept: PAIN MEDICINE | Facility: CLINIC | Age: 50
End: 2020-05-11

## 2020-05-11 ENCOUNTER — TELEMEDICINE (OUTPATIENT)
Dept: PAIN MEDICINE | Facility: CLINIC | Age: 50
End: 2020-05-11
Payer: COMMERCIAL

## 2020-05-11 ENCOUNTER — TELEMEDICINE (OUTPATIENT)
Dept: DIABETES SERVICES | Facility: CLINIC | Age: 50
End: 2020-05-11
Payer: COMMERCIAL

## 2020-05-11 DIAGNOSIS — E10.65 TYPE 1 DIABETES MELLITUS WITH HYPERGLYCEMIA (HCC): ICD-10-CM

## 2020-05-11 DIAGNOSIS — M54.16 LUMBAR RADICULOPATHY: ICD-10-CM

## 2020-05-11 DIAGNOSIS — G89.4 CHRONIC PAIN SYNDROME: Primary | ICD-10-CM

## 2020-05-11 PROCEDURE — 99441 PR PHYS/QHP TELEPHONE EVALUATION 5-10 MIN: CPT | Performed by: NURSE PRACTITIONER

## 2020-05-11 PROCEDURE — G0108 DIAB MANAGE TRN  PER INDIV: HCPCS

## 2020-05-11 RX ORDER — GABAPENTIN 600 MG/1
600 TABLET ORAL 3 TIMES DAILY
Qty: 270 TABLET | Refills: 0 | Status: SHIPPED | OUTPATIENT
Start: 2020-05-11 | End: 2020-12-22 | Stop reason: SDUPTHER

## 2020-05-29 DIAGNOSIS — E78.2 MIXED HYPERLIPIDEMIA: ICD-10-CM

## 2020-05-29 DIAGNOSIS — I10 HYPERTENSION, UNSPECIFIED TYPE: ICD-10-CM

## 2020-05-29 RX ORDER — RAMIPRIL 10 MG/1
CAPSULE ORAL
Qty: 90 CAPSULE | Refills: 0 | Status: SHIPPED | OUTPATIENT
Start: 2020-05-29 | End: 2020-08-18

## 2020-05-29 RX ORDER — ATORVASTATIN CALCIUM 80 MG/1
TABLET, FILM COATED ORAL
Qty: 90 TABLET | Refills: 0 | Status: SHIPPED | OUTPATIENT
Start: 2020-05-29 | End: 2020-08-18

## 2020-06-12 DIAGNOSIS — E10.65 TYPE 1 DIABETES MELLITUS WITH HYPERGLYCEMIA (HCC): ICD-10-CM

## 2020-06-12 RX ORDER — BLOOD-GLUCOSE,RECEIVER,CONT
EACH MISCELLANEOUS
Qty: 1 DEVICE | Refills: 0 | Status: SHIPPED | OUTPATIENT
Start: 2020-06-12

## 2020-06-25 ENCOUNTER — TELEPHONE (OUTPATIENT)
Dept: ENDOCRINOLOGY | Facility: CLINIC | Age: 50
End: 2020-06-25

## 2020-06-25 DIAGNOSIS — E10.65 TYPE 1 DIABETES MELLITUS WITH HYPERGLYCEMIA (HCC): Primary | ICD-10-CM

## 2020-06-25 RX ORDER — INSULIN ASPART 100 [IU]/ML
INJECTION, SOLUTION INTRAVENOUS; SUBCUTANEOUS
Qty: 10 PEN | Refills: 1 | Status: SHIPPED | OUTPATIENT
Start: 2020-06-25 | End: 2020-08-12 | Stop reason: DRUGHIGH

## 2020-07-01 ENCOUNTER — TELEPHONE (OUTPATIENT)
Dept: DIABETES SERVICES | Facility: CLINIC | Age: 50
End: 2020-07-01

## 2020-07-01 NOTE — TELEPHONE ENCOUNTER
Gerard was unable to make his appt yesterday  I spoke with him and asked him to call [employer] for some samples of different infusion sets  He will call to hali when he is ready to start back on his pump  He just received supplies or his sensor and willrestart

## 2020-08-11 ENCOUNTER — APPOINTMENT (OUTPATIENT)
Dept: LAB | Age: 50
End: 2020-08-11
Payer: COMMERCIAL

## 2020-08-11 DIAGNOSIS — E10.65 TYPE 1 DIABETES MELLITUS WITH HYPERGLYCEMIA (HCC): ICD-10-CM

## 2020-08-11 LAB
ALBUMIN SERPL BCP-MCNC: 3.9 G/DL (ref 3.5–5)
ALP SERPL-CCNC: 93 U/L (ref 46–116)
ALT SERPL W P-5'-P-CCNC: 34 U/L (ref 12–78)
ANION GAP SERPL CALCULATED.3IONS-SCNC: 5 MMOL/L (ref 4–13)
AST SERPL W P-5'-P-CCNC: 15 U/L (ref 5–45)
BILIRUB SERPL-MCNC: 0.72 MG/DL (ref 0.2–1)
BUN SERPL-MCNC: 18 MG/DL (ref 5–25)
CALCIUM SERPL-MCNC: 9.4 MG/DL (ref 8.3–10.1)
CHLORIDE SERPL-SCNC: 103 MMOL/L (ref 100–108)
CHOLEST SERPL-MCNC: 124 MG/DL (ref 50–200)
CO2 SERPL-SCNC: 31 MMOL/L (ref 21–32)
CREAT SERPL-MCNC: 1.04 MG/DL (ref 0.6–1.3)
CREAT UR-MCNC: 76.5 MG/DL
EST. AVERAGE GLUCOSE BLD GHB EST-MCNC: 154 MG/DL
GFR SERPL CREATININE-BSD FRML MDRD: 85 ML/MIN/1.73SQ M
GLUCOSE P FAST SERPL-MCNC: 173 MG/DL (ref 65–99)
HBA1C MFR BLD: 7 %
HDLC SERPL-MCNC: 32 MG/DL
LDLC SERPL CALC-MCNC: 73 MG/DL (ref 0–100)
MICROALBUMIN UR-MCNC: 5.1 MG/L (ref 0–20)
MICROALBUMIN/CREAT 24H UR: 7 MG/G CREATININE (ref 0–30)
POTASSIUM SERPL-SCNC: 4.3 MMOL/L (ref 3.5–5.3)
PROT SERPL-MCNC: 7.6 G/DL (ref 6.4–8.2)
SODIUM SERPL-SCNC: 139 MMOL/L (ref 136–145)
T4 FREE SERPL-MCNC: 0.88 NG/DL (ref 0.76–1.46)
TRIGL SERPL-MCNC: 93 MG/DL
TSH SERPL DL<=0.05 MIU/L-ACNC: 2.15 UIU/ML (ref 0.36–3.74)

## 2020-08-11 PROCEDURE — 36415 COLL VENOUS BLD VENIPUNCTURE: CPT | Performed by: NURSE PRACTITIONER

## 2020-08-11 PROCEDURE — 83036 HEMOGLOBIN GLYCOSYLATED A1C: CPT | Performed by: NURSE PRACTITIONER

## 2020-08-11 PROCEDURE — 84681 ASSAY OF C-PEPTIDE: CPT

## 2020-08-11 PROCEDURE — 82570 ASSAY OF URINE CREATININE: CPT | Performed by: NURSE PRACTITIONER

## 2020-08-11 PROCEDURE — 80053 COMPREHEN METABOLIC PANEL: CPT | Performed by: NURSE PRACTITIONER

## 2020-08-11 PROCEDURE — 82043 UR ALBUMIN QUANTITATIVE: CPT | Performed by: NURSE PRACTITIONER

## 2020-08-11 PROCEDURE — 84439 ASSAY OF FREE THYROXINE: CPT | Performed by: NURSE PRACTITIONER

## 2020-08-11 PROCEDURE — 80061 LIPID PANEL: CPT | Performed by: NURSE PRACTITIONER

## 2020-08-11 PROCEDURE — 84443 ASSAY THYROID STIM HORMONE: CPT | Performed by: NURSE PRACTITIONER

## 2020-08-12 ENCOUNTER — TELEPHONE (OUTPATIENT)
Dept: ENDOCRINOLOGY | Facility: CLINIC | Age: 50
End: 2020-08-12

## 2020-08-12 ENCOUNTER — OFFICE VISIT (OUTPATIENT)
Dept: ENDOCRINOLOGY | Facility: CLINIC | Age: 50
End: 2020-08-12
Payer: COMMERCIAL

## 2020-08-12 VITALS
HEART RATE: 84 BPM | WEIGHT: 303.6 LBS | DIASTOLIC BLOOD PRESSURE: 68 MMHG | HEIGHT: 71 IN | BODY MASS INDEX: 42.5 KG/M2 | SYSTOLIC BLOOD PRESSURE: 158 MMHG

## 2020-08-12 DIAGNOSIS — E04.1 THYROID NODULE: ICD-10-CM

## 2020-08-12 DIAGNOSIS — E10.65 TYPE 1 DIABETES MELLITUS WITH HYPERGLYCEMIA (HCC): ICD-10-CM

## 2020-08-12 DIAGNOSIS — E78.2 MIXED HYPERLIPIDEMIA: ICD-10-CM

## 2020-08-12 DIAGNOSIS — E10.65 TYPE 1 DIABETES MELLITUS WITH HYPERGLYCEMIA (HCC): Primary | ICD-10-CM

## 2020-08-12 PROBLEM — Z96.41 INSULIN PUMP IN PLACE: Status: RESOLVED | Noted: 2019-07-18 | Resolved: 2020-08-12

## 2020-08-12 LAB — C PEPTIDE SERPL-MCNC: <0.1 NG/ML (ref 1.1–4.4)

## 2020-08-12 PROCEDURE — 3077F SYST BP >= 140 MM HG: CPT | Performed by: NURSE PRACTITIONER

## 2020-08-12 PROCEDURE — 99214 OFFICE O/P EST MOD 30 MIN: CPT | Performed by: NURSE PRACTITIONER

## 2020-08-12 PROCEDURE — 3008F BODY MASS INDEX DOCD: CPT | Performed by: NURSE PRACTITIONER

## 2020-08-12 PROCEDURE — 95251 CONT GLUC MNTR ANALYSIS I&R: CPT | Performed by: NURSE PRACTITIONER

## 2020-08-12 PROCEDURE — 3051F HG A1C>EQUAL 7.0%<8.0%: CPT | Performed by: NURSE PRACTITIONER

## 2020-08-12 PROCEDURE — 1036F TOBACCO NON-USER: CPT | Performed by: NURSE PRACTITIONER

## 2020-08-12 PROCEDURE — 3078F DIAST BP <80 MM HG: CPT | Performed by: NURSE PRACTITIONER

## 2020-08-12 RX ORDER — INSULIN ASPART 100 [IU]/ML
INJECTION, SOLUTION INTRAVENOUS; SUBCUTANEOUS
Qty: 10 PEN | Refills: 3 | Status: SHIPPED | OUTPATIENT
Start: 2020-08-12 | End: 2020-11-19 | Stop reason: SDUPTHER

## 2020-08-12 RX ORDER — INSULIN GLARGINE 100 [IU]/ML
INJECTION, SOLUTION SUBCUTANEOUS
Qty: 15 PEN | Refills: 3 | Status: SHIPPED | OUTPATIENT
Start: 2020-08-12 | End: 2020-12-22 | Stop reason: SDUPTHER

## 2020-08-12 RX ORDER — INSULIN GLARGINE 100 [IU]/ML
INJECTION, SOLUTION SUBCUTANEOUS
Qty: 15 PEN | Refills: 3 | Status: SHIPPED | OUTPATIENT
Start: 2020-08-12 | End: 2020-08-12 | Stop reason: SDUPTHER

## 2020-08-12 RX ORDER — INSULIN DEGLUDEC 200 U/ML
INJECTION, SOLUTION SUBCUTANEOUS
Qty: 6 PEN | Refills: 3 | Status: SHIPPED | OUTPATIENT
Start: 2020-08-12 | End: 2020-08-12 | Stop reason: CLARIF

## 2020-08-12 NOTE — TELEPHONE ENCOUNTER
Pt called and he checked with his ins for what they cover as far as long acting insulin  He said Lantus would be one that they cover  Would you have an objection to Rx this?

## 2020-08-12 NOTE — ASSESSMENT & PLAN NOTE
He plans to make an appointment for his thyroid US this week  Thyroid function is stable  Denies any compressive symptoms

## 2020-08-12 NOTE — PROGRESS NOTES
Established Patient Progress Note      Chief Complaint   Patient presents with    Diabetes Type 1        History of Present Illness:   Michelle Espitia is a 52 y o  male with HTN, HLD, thyroid nodule, and type 1 diabetes with insulin resistance with long term use of insulin he was 29  Reports complications of neuropathy and retinopathy  Denies recent illness or hospitalizations  Denies recent severe hypoglycemic or severe hyperglycemic episodes  Denies any issues with his current regimen  Patient had been using a [employer] 670G pump prescribed by endocrinology for over 5 years years  He stopped using the pump on June 25, 2020 d/t difficulties/malfunctioning of the sensors  Now using Dexcom G6 sensor  Current regimen: Novolog 20-20-20; 25 if BG > 200  Tresiba U-200 120    Michelle Espitia   Device used Dexcom G6  Home use     Indication   Type 1 Diabetes with insulin resistance    More than 72 hours of data was reviewed  Report to be scanned to chart  Date Range: 07/30/2020-08/12/2020    Analysis of data:   Average Glucose: 169   SD : 66   Time in Target Range: 57%   Time Above Range: 30%   Time Below Range: 3%     Interpretation of data: Patient has gained better control on basal-bolus insulin  He has a pattern of early morning lows starting around 1:30 am, peaking at 6:00 am, and resolving at noon  Patient states that he is eating dinner later, around 8: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, 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  However, upon further investigation, he has also found that he is having hypoglycemia prior to bed around midnight  He does treat this with a significant portion of carbohydrates, such as an english muffin  Patient reports feeling well, overall  Denies symptoms of hyper/hypoglycemia  Last Eye Exam: He sees Dr Nicolás Phelps every 3 months for moderate diabetic retinopathy and glaucoma  Last Foot Exam: 8/12/2020    For HTN, he takes ramapril  He denies HA and cough  For HLD, he takes atorvastatin   He denies myalgias        Patient Active Problem List   Diagnosis    Type 1 diabetes mellitus with hyperglycemia (HCC)    Mixed hyperlipidemia    Thyroid nodule    Benign essential hypertension    Erectile dysfunction of non-organic origin    Hypoglycemia    Other specified congenital malformations of skin    Low back pain with sciatica    Somatic dysfunction of lumbar region    Strain of right psoas muscle    Voiding dysfunction    Lumbar radiculopathy      Past Medical History:   Diagnosis Date    Diabetes mellitus ([facility] [address] )     Insulin pump in place 7/18/2019    Vitamin D deficiency       Past Surgical History:   Procedure Laterality Date    NO PAST SURGERIES        Family History   Problem Relation Age of Onset    Lung cancer Mother     Cancer Maternal Grandfather     Diabetes Maternal Grandfather     Diabetes Family     Other Family         cardiac disorder    Hypertension Family      Social History     Tobacco Use    Smoking status: Never Smoker    Smokeless tobacco: Never Used   Substance Use Topics    Alcohol use: Not Currently     Comment: socially     No Known Allergies      Current Outpatient Medications:     aspirin 81 MG tablet, Take 1 tablet by mouth daily, Disp: , Rfl:     atorvastatin (LIPITOR) 80 mg tablet, TAKE ONE TABLET BY MOUTH EVERY DAY, Disp: 90 tablet, Rfl: 0    bimatoprost (LUMIGAN) 0 01 % ophthalmic drops, Apply 1 drop to eye Daily, Disp: , Rfl:     Blood Glucose Monitoring Suppl (ONE TOUCH ULTRA 2) w/Device KIT, by Does not apply route, Disp: , Rfl:     Cholecalciferol (VITAMIN D) 2000 units CAPS, Take by mouth, Disp: , Rfl:     Continuous Blood Gluc  (DEXCOM G6 ) SHARON, Use daily as directed for CGM, Disp: 1 Device, Rfl: 0    Continuous Blood Gluc Sensor (DEXCOM G6 SENSOR) MISC, Transmitter changed every 90 days, Disp: 1 each, Rfl: 3    Continuous Blood Gluc Transmit (DEXCOM G6 TRANSMITTER) MISC, Sensors changed every 10 days, Disp: 12 each, Rfl: 3   gabapentin (NEURONTIN) 600 MG tablet, Take 1 tablet (600 mg total) by mouth 3 (three) times a day, Disp: 270 tablet, Rfl: 0    glucagon (GLUCAGON EMERGENCY) 1 MG injection, Inject as directed Twice daily, Disp: , Rfl:     glucose blood (ONE TOUCH ULTRA TEST) test strip, Test 6x daily, Disp: 600 each, Rfl: 3    Insulin Pen Needle (BD PEN NEEDLE NADYA U/F) 32G X 4 MM MISC, by Does not apply route daily, Disp: , Rfl:     Insulin Pen Needle (BD Pen Needle Nadya U/F) 32G X 4 MM MISC, Use 4 per day, Disp: 100 each, Rfl: 1    Multiple Vitamins-Minerals (MULTIVITAMINS PLUS ZINC) CAPS, Take by mouth, Disp: , Rfl:     ONETOUCH DELICA LANCETS FINE MISC, by Does not apply route, Disp: , Rfl:     ramipril (ALTACE) 10 MG capsule, TAKE ONE CAPSULE BY MOUTH EVERY DAY, Disp: 90 capsule, Rfl: 0    Insulin Aspart FlexPen (NovoLOG FlexPen) 100 UNIT/ML SOPN, Use up to 25 units three times daily  , Disp: 10 pen, Rfl: 3    insulin degludec Miguel Angel Plum FlexTouch) 200 units/mL CONCENTRATED U-200 injection pen, Inject 120 units under the skin daily  , Disp: 6 pen, Rfl: 3    meloxicam (MOBIC) 15 mg tablet, Take 1 tablet (15 mg total) by mouth daily, Disp: 30 tablet, Rfl: 0    Review of Systems   Constitutional: Negative for activity change, appetite change, fatigue and unexpected weight change  HENT: Negative for sore throat, trouble swallowing and voice change  Eyes: Negative for visual disturbance  Respiratory: Negative for cough, chest tightness and shortness of breath  Cardiovascular: Negative for chest pain, palpitations and leg swelling  Gastrointestinal: Negative for constipation, diarrhea, nausea and vomiting  Endocrine: Negative for polydipsia, polyphagia and polyuria  Genitourinary: Negative for frequency  Musculoskeletal: Positive for arthralgias and back pain  Negative for myalgias  Skin: Negative for wound  Neurological: Negative for dizziness, weakness, light-headedness, numbness and headaches  Psychiatric/Behavioral: Negative for decreased concentration, dysphoric mood and sleep disturbance  The patient is not nervous/anxious  Physical Exam:  Body mass index is 42 34 kg/m²  /68   Pulse 84   Ht 5' 11" (1 803 m)   Wt (!) 138 kg (303 lb 9 6 oz)   BMI 42 34 kg/m²    Wt Readings from Last 3 Encounters:   08/12/20 (!) 138 kg (303 lb 9 6 oz)   02/17/20 134 kg (294 lb 6 4 oz)   02/06/20 134 kg (295 lb)       Physical Exam  Constitutional:       Appearance: He is well-developed  HENT:      Head: Normocephalic and atraumatic  Comments: Mask in place  Eyes:      Pupils: Pupils are equal, round, and reactive to light  Neck:      Musculoskeletal: Normal range of motion and neck supple  Thyroid: No thyromegaly  Cardiovascular:      Rate and Rhythm: Normal rate and regular rhythm  Pulses: Normal pulses  no weak pulses          Dorsalis pedis pulses are 2+ on the right side and 2+ on the left side  Posterior tibial pulses are 2+ on the right side and 2+ on the left side  Heart sounds: Normal heart sounds  Pulmonary:      Effort: Pulmonary effort is normal       Breath sounds: Normal breath sounds  Abdominal:      General: Bowel sounds are normal       Palpations: Abdomen is soft  Musculoskeletal:      Right lower leg: No edema  Left lower leg: No edema  Feet:      Right foot:      Skin integrity: Dry skin present  No ulcer, skin breakdown, erythema, warmth or callus  Left foot:      Skin integrity: Dry skin present  No ulcer, skin breakdown, erythema, warmth or callus  Lymphadenopathy:      Cervical: No cervical adenopathy  Skin:     General: Skin is warm and dry  Capillary Refill: Capillary refill takes less than 2 seconds  Neurological:      Mental Status: He is alert and oriented to person, place, and time     Psychiatric:         Mood and Affect: Mood normal          Behavior: Behavior normal        Patient's shoes and socks removed  Right Foot/Ankle   Right Foot Inspection  Skin Exam: skin normal, skin intact and dry skin no warmth, no callus, no erythema, no maceration, no abnormal color, no pre-ulcer, no ulcer and no callus                          Toe Exam: ROM and strength within normal limits  Sensory   Vibration: diminished  Proprioception: intact   Monofilament testing: intact  Vascular  Capillary refills: < 3 seconds  The right DP pulse is 2+  The right PT pulse is 2+  Left Foot/Ankle  Left Foot Inspection  Skin Exam: skin normal, skin intact and dry skinno warmth, no erythema, no maceration, normal color, no pre-ulcer, no ulcer and no callus                         Toe Exam: ROM and strength within normal limits                   Sensory   Vibration: diminished  Proprioception: intact  Monofilament: intact  Vascular  Capillary refills: < 3 seconds  The left DP pulse is 2+  The left PT pulse is 2+  Assign Risk Category:  No deformity present; Loss of protective sensation;  No weak pulses       Risk: 0      Labs:   Lab Results   Component Value Date    HGBA1C 7 0 (H) 08/11/2020    HGBA1C 7 7 (H) 07/25/2019    HGBA1C 7 4 (H) 04/04/2018     Lab Results   Component Value Date    CREATININE 1 04 08/11/2020    CREATININE 0 98 07/25/2019    CREATININE 1 02 04/04/2018    BUN 18 08/11/2020     11/06/2015    K 4 3 08/11/2020     08/11/2020    CO2 31 08/11/2020     eGFR   Date Value Ref Range Status   08/11/2020 85 ml/min/1 73sq m Final     Lab Results   Component Value Date    CHOL 148 08/04/2015    HDL 32 (L) 08/11/2020    TRIG 93 08/11/2020     Lab Results   Component Value Date    ALT 34 08/11/2020    AST 15 08/11/2020    ALKPHOS 93 08/11/2020    BILITOT 0 61 11/06/2015     Lab Results   Component Value Date    QSS4VPXKVMKC 2 150 08/11/2020    FPT1MWEETFJU 1 460 07/25/2019    EUX7LETJYWRN 1 580 04/04/2018     Lab Results   Component Value Date    FREET4 0 88 08/11/2020       Impression & Plan:    Problem List Items Addressed This Visit        Endocrine    Type 1 diabetes mellitus with hyperglycemia (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  As he has adamantly expressed that he does not want to return to using an insulin pump, referral for flexible insulin therapy education has been placed  Patient is to call if he has any episodes of hypo/hyperglycemia  Lab Results   Component Value Date    HGBA1C 7 0 (H) 08/11/2020            Relevant Medications    Insulin Aspart FlexPen (NovoLOG FlexPen) 100 UNIT/ML SOPN    insulin degludec Darlene  FlexTouch) 200 units/mL CONCENTRATED U-200 injection pen    Other Relevant Orders    Ambulatory referral to Diabetic Education    Thyroid nodule     He plans to make an appointment for his thyroid US this week  Thyroid function is stable  Denies any compressive symptoms  Other    Mixed hyperlipidemia     Lipid panel continues to improve  Continue statin  Increase physical activity  Avoid saturated fats  Orders Placed This Encounter   Procedures    Ambulatory referral to Diabetic Education     Standing Status:   Future     Standing Expiration Date:   8/12/2021     Referral Priority:   Routine     Referral Type:   Consult - AMB     Referral Reason:   Specialty Services Required     Requested Specialty:   Diabetes Services     Number of Visits Requested:   1     Expiration Date:   8/12/2021       Patient Instructions   1  Call insurance and ask if they have a preferred long-acting/basal insulin  Would Lantus or Basaglar be less expensive? 2  Continue Tresiba (Long-acting) 120 units daily    3  Take Novolog (Fast-acting) 20-20-16;  Take 25 unit if BG >200    4  Send upload of Dexcom sensor in two weeks  Discussed with the patient and all questioned fully answered  He will call me if any problems arise  Follow-up appointment in 3 months       Counseled patient on diagnostic results, prognosis, risk and benefit of treatment options, instruction for management, importance of treatment compliance, Risk  factor reduction and impressions    LISA Morillo

## 2020-08-12 NOTE — ASSESSMENT & PLAN NOTE
Lipid panel continues to improve  Continue statin  Increase physical activity  Avoid saturated fats

## 2020-08-12 NOTE — PATIENT INSTRUCTIONS
1  Call insurance and ask if they have a preferred long-acting/basal insulin  Would Lantus or Basaglar be less expensive? 2  Continue Tresiba (Long-acting) 120 units daily    3  Take Novolog (Fast-acting) 20-20-16; Take 25 unit if BG >200    4  Send upload of Dexcom sensor in two weeks

## 2020-08-12 NOTE — TELEPHONE ENCOUNTER
Spoke with pt re: new Rx for Lantus, this is the one his ins will cover  He will be taking 60 U in the am and 60 U in the pm, approx 12 hrs apart    Pe expressed understanding and knows he can call if any questions

## 2020-08-12 NOTE — ASSESSMENT & PLAN NOTE
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

## 2020-08-12 NOTE — TELEPHONE ENCOUNTER
Absolutely  The dose stays the same  However, with Lantus, we will split the 120 units into a morning and evening dose  So he will take 60 units in the morning when he wakes up and 60 units in the evening  Try to time them 12 hours apart as best as he can

## 2020-08-16 DIAGNOSIS — I10 HYPERTENSION, UNSPECIFIED TYPE: ICD-10-CM

## 2020-08-16 DIAGNOSIS — E78.2 MIXED HYPERLIPIDEMIA: ICD-10-CM

## 2020-08-18 ENCOUNTER — TELEMEDICINE (OUTPATIENT)
Dept: DIABETES SERVICES | Facility: CLINIC | Age: 50
End: 2020-08-18
Payer: COMMERCIAL

## 2020-08-18 DIAGNOSIS — E10.65 TYPE 1 DIABETES MELLITUS WITH HYPERGLYCEMIA (HCC): Primary | ICD-10-CM

## 2020-08-18 PROCEDURE — G0108 DIAB MANAGE TRN  PER INDIV: HCPCS | Performed by: DIETITIAN, REGISTERED

## 2020-08-18 RX ORDER — RAMIPRIL 10 MG/1
CAPSULE ORAL
Qty: 90 CAPSULE | Refills: 0 | Status: SHIPPED | OUTPATIENT
Start: 2020-08-18 | End: 2020-12-22 | Stop reason: SDUPTHER

## 2020-08-18 RX ORDER — ATORVASTATIN CALCIUM 80 MG/1
TABLET, FILM COATED ORAL
Qty: 90 TABLET | Refills: 0 | Status: SHIPPED | OUTPATIENT
Start: 2020-08-18 | End: 2020-12-22 | Stop reason: SDUPTHER

## 2020-08-18 NOTE — PROGRESS NOTES
Virtual Regular Visit      Assessment/Plan:    Problem List Items Addressed This Visit     None               Reason for visit is   Chief Complaint   Patient presents with    Virtual Regular Visit        Encounter provider Caden Siddiqui    Provider located at [address] [facility],[address]  [address] [address]      Recent Visits  No visits were found meeting these conditions  Showing recent visits within past 7 days and meeting all other requirements     Future Appointments  No visits were found meeting these conditions  Showing future appointments within next 150 days and meeting all other requirements        The patient was identified by name and date of birth  Saint Nancy was informed that this is a telemedicine visit and that the visit is being conducted through Qnary  My office door was closed  No one else was in the room  He acknowledged consent and understanding of privacy and security of the video platform  The patient has agreed to participate and understands they can discontinue the visit at any time  Patient is aware this is a billable service  Subjective  Saint Nancy is a 52 y o  male T1DM  See DSMT note below         HPI     Past Medical History:   Diagnosis Date    Diabetes mellitus ([address] )     Insulin pump in place 7/18/2019    Vitamin D deficiency        Past Surgical History:   Procedure Laterality Date    NO PAST SURGERIES         Current Outpatient Medications   Medication Sig Dispense Refill    aspirin 81 MG tablet Take 1 tablet by mouth daily      atorvastatin (LIPITOR) 80 mg tablet TAKE ONE TABLET BY MOUTH EVERY DAY 90 tablet 0    bimatoprost (LUMIGAN) 0 01 % ophthalmic drops Apply 1 drop to eye Daily      Blood Glucose Monitoring Suppl (ONE TOUCH ULTRA 2) w/Device KIT by Does not apply route      Cholecalciferol (VITAMIN D) 2000 units CAPS Take by mouth      Continuous Blood Gluc  (DEXCOM G6 ) SHARON Use daily as directed for CGM 1 Device 0    Continuous Blood Gluc Sensor (DEXCOM G6 SENSOR) MISC Transmitter changed every 90 days 1 each 3    Continuous Blood Gluc Transmit (DEXCOM G6 TRANSMITTER) MISC Sensors changed every 10 days 12 each 3    gabapentin (NEURONTIN) 600 MG tablet Take 1 tablet (600 mg total) by mouth 3 (three) times a day 270 tablet 0    glucagon (GLUCAGON EMERGENCY) 1 MG injection Inject as directed Twice daily      glucose blood (ONE TOUCH ULTRA TEST) test strip Test 6x daily 600 each 3    Insulin Aspart FlexPen (NovoLOG FlexPen) 100 UNIT/ML SOPN Use up to 25 units three times daily  10 pen 3    insulin glargine (Lantus SoloStar) 100 units/mL injection pen Inject 60 units of Lantus in the morning  Inject 60 units of Lantus in the evening  15 pen 3    Insulin Pen Needle (BD PEN NEEDLE NADYA U/F) 32G X 4 MM MISC by Does not apply route daily      Insulin Pen Needle (BD Pen Needle Nadya U/F) 32G X 4 MM MISC Use 4 per day 100 each 1    meloxicam (MOBIC) 15 mg tablet Take 1 tablet (15 mg total) by mouth daily 30 tablet 0    Multiple Vitamins-Minerals (MULTIVITAMINS PLUS ZINC) CAPS Take by mouth      ONETOUCH DELICA LANCETS FINE MISC by Does not apply route      ramipril (ALTACE) 10 MG capsule TAKE ONE CAPSULE BY MOUTH EVERY DAY 90 capsule 0     No current facility-administered medications for this visit  No Known Allergies    Review of Systems    Video Exam    There were no vitals filed for this visit  Physical Exam     I spent 30 minutes directly with the patient during this visit      [facility] acknowledges that he has consented to an online visit or consultation   He understands that the online visit is based solely on information provided by him, and that, in the absence of a face-to-face physical evaluation by the physician, the diagnosis he receives is both limited and provisional in terms of accuracy and completeness  This is not intended to replace a full medical face-to-face evaluation by the physician  Shahzad Salgado understands and accepts these terms  Carbohydrate Counting Instruction    Met with Shahzad Salgado for carbohydrate counting  Rena Pradhan is currently on the following insulin regimen: Novolog 20-20-20 and 25 units for BG>200 and Tresiba 120 units once daily (patient is finishing up [place] and will start Lantus 60 units by the end of the week)  Patient instructed on: Carbohydrate counting  Instruction was provided using power point slides and food labels  Patient appeared to comprehend the materials presented at the visit  Patient demonstrates good math skills  Rena Pradhan agreed to keep a 3 day food record and return it in one week for assessment  Comments: Rena Pradhan has good understanding of carbohydrate counting  Diabetes Education Record: Rena Pradhan was sent the following education material via email: Katina White and food record  Patient response to instruction  Comprehension: good  Motivation: good  Expected Compliance: good  Readiness: action  Barriers to Learning: none known     Start- Stop: 1:15-1:45  Total Minutes: 30 Minutes  Group or Individual Instruction: DSMT-I  Other: David Montoya PA-C    Thank you for referring your patient to [facility], it was a pleasure working with them today  Please feel free to call with any questions or concerns      Leana Lni  [address] [place] [id number]

## 2020-08-24 DIAGNOSIS — E10.65 TYPE 1 DIABETES MELLITUS WITH HYPERGLYCEMIA (HCC): ICD-10-CM

## 2020-08-24 RX ORDER — BLOOD-GLUCOSE SENSOR
EACH MISCELLANEOUS
Qty: 9 EACH | Refills: 3 | Status: SHIPPED | OUTPATIENT
Start: 2020-08-24 | End: 2021-11-15 | Stop reason: SDUPTHER

## 2020-08-24 RX ORDER — BLOOD-GLUCOSE TRANSMITTER
EACH MISCELLANEOUS
Qty: 1 EACH | Refills: 3 | Status: SHIPPED | OUTPATIENT
Start: 2020-08-24 | End: 2021-08-25 | Stop reason: SDUPTHER

## 2020-11-19 DIAGNOSIS — E10.65 TYPE 1 DIABETES MELLITUS WITH HYPERGLYCEMIA (HCC): ICD-10-CM

## 2020-11-20 RX ORDER — INSULIN ASPART 100 [IU]/ML
INJECTION, SOLUTION INTRAVENOUS; SUBCUTANEOUS
Qty: 10 PEN | Refills: 3 | Status: SHIPPED | OUTPATIENT
Start: 2020-11-20 | End: 2020-12-22 | Stop reason: SDUPTHER

## 2020-12-22 ENCOUNTER — OFFICE VISIT (OUTPATIENT)
Dept: ENDOCRINOLOGY | Facility: CLINIC | Age: 50
End: 2020-12-22
Payer: COMMERCIAL

## 2020-12-22 VITALS
DIASTOLIC BLOOD PRESSURE: 72 MMHG | SYSTOLIC BLOOD PRESSURE: 138 MMHG | HEART RATE: 86 BPM | WEIGHT: 299.8 LBS | BODY MASS INDEX: 41.81 KG/M2

## 2020-12-22 DIAGNOSIS — M54.16 LUMBAR RADICULOPATHY: ICD-10-CM

## 2020-12-22 DIAGNOSIS — I10 HYPERTENSION, UNSPECIFIED TYPE: ICD-10-CM

## 2020-12-22 DIAGNOSIS — E78.2 MIXED HYPERLIPIDEMIA: ICD-10-CM

## 2020-12-22 DIAGNOSIS — I10 BENIGN ESSENTIAL HYPERTENSION: ICD-10-CM

## 2020-12-22 DIAGNOSIS — E04.1 THYROID NODULE: Primary | ICD-10-CM

## 2020-12-22 DIAGNOSIS — E10.65 TYPE 1 DIABETES MELLITUS WITH HYPERGLYCEMIA (HCC): ICD-10-CM

## 2020-12-22 PROCEDURE — 99214 OFFICE O/P EST MOD 30 MIN: CPT | Performed by: NURSE PRACTITIONER

## 2020-12-22 RX ORDER — INSULIN GLARGINE 100 [IU]/ML
INJECTION, SOLUTION SUBCUTANEOUS
Qty: 30 PEN | Refills: 1 | Status: SHIPPED | OUTPATIENT
Start: 2020-12-22 | End: 2021-03-27 | Stop reason: SDUPTHER

## 2020-12-22 RX ORDER — ATORVASTATIN CALCIUM 80 MG/1
80 TABLET, FILM COATED ORAL DAILY
Qty: 90 TABLET | Refills: 1 | Status: SHIPPED | OUTPATIENT
Start: 2020-12-22 | End: 2021-06-23 | Stop reason: SDUPTHER

## 2020-12-22 RX ORDER — INSULIN ASPART 100 [IU]/ML
INJECTION, SOLUTION INTRAVENOUS; SUBCUTANEOUS
Qty: 30 PEN | Refills: 2 | Status: SHIPPED | OUTPATIENT
Start: 2020-12-22 | End: 2021-05-18 | Stop reason: SDUPTHER

## 2020-12-22 RX ORDER — GABAPENTIN 600 MG/1
600 TABLET ORAL 3 TIMES DAILY
Qty: 270 TABLET | Refills: 0 | Status: SHIPPED | OUTPATIENT
Start: 2020-12-22 | End: 2021-02-01 | Stop reason: SDUPTHER

## 2020-12-22 RX ORDER — RAMIPRIL 10 MG/1
10 CAPSULE ORAL DAILY
Qty: 90 CAPSULE | Refills: 0 | Status: SHIPPED | OUTPATIENT
Start: 2020-12-22 | End: 2021-03-28 | Stop reason: SDUPTHER

## 2021-01-12 ENCOUNTER — IMMUNIZATIONS (OUTPATIENT)
Dept: FAMILY MEDICINE CLINIC | Facility: HOSPITAL | Age: 51
End: 2021-01-12

## 2021-01-12 DIAGNOSIS — Z23 ENCOUNTER FOR IMMUNIZATION: ICD-10-CM

## 2021-01-12 PROCEDURE — 91301 SARS-COV-2 / COVID-19 MRNA VACCINE (MODERNA) 100 MCG: CPT

## 2021-01-12 PROCEDURE — 0011A SARS-COV-2 / COVID-19 MRNA VACCINE (MODERNA) 100 MCG: CPT

## 2021-02-01 ENCOUNTER — TELEPHONE (OUTPATIENT)
Dept: PAIN MEDICINE | Facility: MEDICAL CENTER | Age: 51
End: 2021-02-01

## 2021-02-01 DIAGNOSIS — M54.16 LUMBAR RADICULOPATHY: ICD-10-CM

## 2021-02-01 RX ORDER — GABAPENTIN 600 MG/1
600 TABLET ORAL 3 TIMES DAILY
Qty: 270 TABLET | Refills: 0 | Status: SHIPPED | OUTPATIENT
Start: 2021-02-01 | End: 2021-02-08

## 2021-02-01 NOTE — TELEPHONE ENCOUNTER
Patient said he fell on ice the other day and his back is sore  He is wondering if the doctor can prescribe him gabapentin?  # [phone]

## 2021-02-01 NOTE — TELEPHONE ENCOUNTER
S/W pt  Pt is asking for a refill of Gabapentin 600 mg 3x/day  Send to [email or web address]  Pt wanted to schedule a SOVS   Scheduled pt for SOVS on 2/22 at 7:15 w/ [address]  Pt stated no need to C/B when refill is sent  Please advise

## 2021-02-05 NOTE — TELEPHONE ENCOUNTER
S/w pt, states he has been taking gabapentin as prescribed and supplemental advil, but he continues to be woken up by shooting pain in lower back and right leg  Pt said he typically can only sleep for a few hours before the pain wakes him, he tries to walk around, do the stretches he learned in PT, and uses ice / heat with minimal relief  Pt asked if there are any other recommendations of things he can do to make him more comfortable at night? Please advise, thank you

## 2021-02-05 NOTE — TELEPHONE ENCOUNTER
Pt called stating that gabapentin is not really helping him   Pt states he is not able to sleep more then 2 hours     Pt can be reached at [phone]

## 2021-02-06 ENCOUNTER — IMMUNIZATIONS (OUTPATIENT)
Dept: FAMILY MEDICINE CLINIC | Facility: HOSPITAL | Age: 51
End: 2021-02-06

## 2021-02-06 DIAGNOSIS — Z23 ENCOUNTER FOR IMMUNIZATION: Primary | ICD-10-CM

## 2021-02-06 PROCEDURE — 0012A SARS-COV-2 / COVID-19 MRNA VACCINE (MODERNA) 100 MCG: CPT

## 2021-02-06 PROCEDURE — 91301 SARS-COV-2 / COVID-19 MRNA VACCINE (MODERNA) 100 MCG: CPT

## 2021-02-08 ENCOUNTER — OFFICE VISIT (OUTPATIENT)
Dept: PAIN MEDICINE | Facility: CLINIC | Age: 51
End: 2021-02-08
Payer: COMMERCIAL

## 2021-02-08 VITALS
DIASTOLIC BLOOD PRESSURE: 95 MMHG | SYSTOLIC BLOOD PRESSURE: 156 MMHG | HEIGHT: 71 IN | WEIGHT: 299 LBS | HEART RATE: 102 BPM | BODY MASS INDEX: 41.86 KG/M2 | TEMPERATURE: 97.9 F

## 2021-02-08 DIAGNOSIS — M54.16 LUMBAR RADICULOPATHY: Primary | ICD-10-CM

## 2021-02-08 PROCEDURE — 99214 OFFICE O/P EST MOD 30 MIN: CPT | Performed by: NURSE PRACTITIONER

## 2021-02-08 RX ORDER — GABAPENTIN 800 MG/1
800 TABLET ORAL 3 TIMES DAILY
Qty: 90 TABLET | Refills: 1 | Status: SHIPPED | OUTPATIENT
Start: 2021-02-08 | End: 2021-12-29

## 2021-02-08 RX ORDER — METHYLPREDNISOLONE 4 MG/1
TABLET ORAL
Qty: 1 EACH | Refills: 0 | Status: SHIPPED | OUTPATIENT
Start: 2021-02-08 | End: 2021-12-29

## 2021-02-08 NOTE — PROGRESS NOTES
Assessment:  1  Lumbar radiculopathy - Right        Plan:  1  I will prescribe the patient a Medrol Dosepak to address the inflammatory component the patient's pain  He was advised to avoid NSAIDs until 24 hours after completion  2  I will increase gabapentin 800 mg 3 times a day for neuropathic complaints  I advised the patient that if they experience any side effects or issues with the changes in their medication regiment, they should give our office a call to discuss  I also advised the patient not to drive or operate machinery until they see how the changes in the medication regimen affects them  The patient was agreeable and verbalized an understanding  3  I will prescribe the patient [facility] based physical therapy for his low back and leg symptoms  If no relief after 6 weeks, will order MRI of the lumbar spine without contrast  4  Once the patient has completed Medrol Dosepak, he can resume ibuprofen p r n  and should not take more than 2400 mg in 24 hours   5  The patient will follow-up in 6 weeks for medication prescription refill and reevaluation  The patient was advised to contact the office should their symptoms worsen in the interim  The patient was agreeable and verbalized an understanding  [employer] software was used to dictate this note  It may contain errors with dictating incorrect words or incorrect spelling  Please contact the provider directly with any questions  History of Present Illness: The patient is a 52 y o  male with a history of insulin-dependent diabetes last A1c of 7 0 last seen on 12/10/20 who presents for a follow up office visit in regards to chronic lumbosacral back pain that radiates into the posterolateral aspect of the right lower extremity patient denies left lower extremity symptoms, bowel or bladder incontinence or saddle anesthesia  The patient states that gabapentin 600 mg 3 times a day was mostly managing his symptoms up until recently    The patient states he slipped on some ice which caused this acute exacerbation of pain  He has been taking ibuprofen p r n  along with the gabapentin with minimal relief  He rates his pain a 9/10 on the numeric pain rating scale  He states the pain is constant nature and follows no particular pattern throughout the day  He characterizes the pain as burning, sharp, throbbing, shooting and numbness  I have personally reviewed and/or updated the patient's past medical history, past surgical history, family history, social history, current medications, allergies, and vital signs today  Review of Systems:    Review of Systems   Respiratory: Negative for shortness of breath  Cardiovascular: Negative for chest pain  Gastrointestinal: Negative for constipation, diarrhea, nausea and vomiting  Musculoskeletal: Negative for arthralgias, gait problem, joint swelling and myalgias  Skin: Negative for rash  Neurological: Negative for dizziness, seizures and weakness  All other systems reviewed and are negative          Past Medical History:   Diagnosis Date    [facility]     Insulin pump in place 7/18/2019    Vitamin D deficiency        Past Surgical History:   Procedure Laterality Date    NO PAST SURGERIES         Family History   Problem Relation Age of Onset    Lung cancer Mother     Cancer Maternal Grandfather     Diabetes Maternal Grandfather     Diabetes Family     Other Family         cardiac disorder    Hypertension Family        Social History     Occupational History    Occupation: security   Tobacco Use    Smoking status: Never Smoker    Smokeless tobacco: Former User     Types: Chew    Tobacco comment: used chewing tobacco for 20 years   Substance and Sexual Activity    Alcohol use: Not Currently     Comment: socially    Drug use: No    Sexual activity: Not on file         Current Outpatient Medications:     aspirin 81 MG tablet, Take 1 tablet by mouth daily, Disp: , Rfl:    atorvastatin (LIPITOR) 80 mg tablet, Take 1 tablet (80 mg total) by mouth daily, Disp: 90 tablet, Rfl: 1    bimatoprost (LUMIGAN) 0 01 % ophthalmic drops, Apply 1 drop to eye Daily, Disp: , Rfl:     Blood Glucose Monitoring Suppl (ONE TOUCH ULTRA 2) w/Device KIT, by Does not apply route, Disp: , Rfl:     Cholecalciferol (VITAMIN D) 2000 units CAPS, Take by mouth, Disp: , Rfl:     Continuous Blood Gluc  (DEXCOM G6 ) SHARON, Use daily as directed for CGM, Disp: 1 Device, Rfl: 0    Continuous Blood Gluc Sensor (Dexcom G6 Sensor) MISC, Use daily as directed for CGM - Change every 10 days, Disp: 9 each, Rfl: 3    Continuous Blood Gluc Transmit (Dexcom G6 Transmitter) MISC, Use daily as directed for CGM - Change every 3 months, Disp: 1 each, Rfl: 3    gabapentin (NEURONTIN) 800 mg tablet, Take 1 tablet (800 mg total) by mouth 3 (three) times a day, Disp: 90 tablet, Rfl: 1    glucagon (GLUCAGON EMERGENCY) 1 MG injection, Inject as directed Twice daily, Disp: , Rfl:     glucose blood (ONE TOUCH ULTRA TEST) test strip, Test 6x daily, Disp: 600 each, Rfl: 3    insulin aspart (NovoLOG FlexPen) 100 UNIT/ML injection pen, Inject up to a maximum of 105 units based on sliding scale and carb coverage  , Disp: 30 pen, Rfl: 2    insulin glargine (Lantus SoloStar) 100 units/mL injection pen, Inject 60 units of Lantus in the morning   Inject 60 units of Lantus in the evening , Disp: 30 pen, Rfl: 1    Insulin Pen Needle (BD PEN NEEDLE NADYA U/F) 32G X 4 MM MISC, by Does not apply route daily, Disp: , Rfl:     Insulin Pen Needle (BD Pen Needle Nadya U/F) 32G X 4 MM MISC, Use 4 per day, Disp: 100 each, Rfl: 1    methylPREDNISolone 4 MG tablet therapy pack, Use as directed on package, Disp: 1 each, Rfl: 0    Multiple Vitamins-Minerals (MULTIVITAMINS PLUS ZINC) CAPS, Take by mouth, Disp: , Rfl:     ONETOUCH DELICA LANCETS FINE MISC, by Does not apply route, Disp: , Rfl:     ramipril (ALTACE) 10 MG capsule, Take 1 capsule (10 mg total) by mouth daily, Disp: 90 capsule, Rfl: 0    No Known Allergies    Physical Exam:    /95   Pulse 102   Temp 97 9 °F (36 6 °C)   Ht 5' 11" (1 803 m)   Wt 136 kg (299 lb)   BMI 41 70 kg/m²     Constitutional:normal, well developed, well nourished, alert, in no distress and non-toxic and no overt pain behavior  Eyes:anicteric  HEENT:grossly intact  Neck:supple, symmetric, trachea midline and no masses   Pulmonary:even and unlabored  Cardiovascular:No edema or pitting edema present  Skin:Normal without rashes or lesions and well hydrated  Psychiatric:Mood and affect appropriate  Neurologic:Cranial Nerves II-XII grossly intact  Musculoskeletal:antalgic  Gait  Bilateral lumbar paraspinal musculature tender to palpation, right greater than left  Positive straight leg raise on the right negative on the left  Negative Les's test bilaterally      Imaging  No orders to display     LUMBAR SPINE     INDICATION:   M54 16: Radiculopathy, lumbar region      COMPARISON:  2/1/2010     VIEWS:  XR SPINE LUMBAR COMPLETE W BENDING MINIMUM 6 VIEWS  Images: 7     FINDINGS:     There are 5 non rib bearing lumbar vertebral bodies       There is no evidence of acute fracture or destructive osseous lesion  Mild chronic stable 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

## 2021-02-08 NOTE — TELEPHONE ENCOUNTER
FYI- S/w pt, moved up OV to today at 2:30 pm, pt said he can only come in on Mondays due to work schedule

## 2021-02-15 ENCOUNTER — EVALUATION (OUTPATIENT)
Dept: PHYSICAL THERAPY | Facility: REHABILITATION | Age: 51
End: 2021-02-15
Payer: COMMERCIAL

## 2021-02-15 DIAGNOSIS — M54.16 LUMBAR RADICULOPATHY: ICD-10-CM

## 2021-02-15 PROCEDURE — 97162 PT EVAL MOD COMPLEX 30 MIN: CPT | Performed by: PHYSICAL MEDICINE & REHABILITATION

## 2021-02-15 PROCEDURE — 97110 THERAPEUTIC EXERCISES: CPT | Performed by: PHYSICAL MEDICINE & REHABILITATION

## 2021-02-15 NOTE — PROGRESS NOTES
PT Evaluation     Today's date: 2/15/2021  Patient name: Jagdish Sahu  : 1970  MRN: [id number]  Referring provider: LISA Barton  Dx:   Encounter Diagnosis     ICD-10-CM    1  Lumbar radiculopathy - Right  M54 16 Ambulatory referral to Physical Therapy       Start Time: 1120  Stop Time: 4181  Total time in clinic (min): 54 minutes    Assessment  Assessment details: Pt is a 52 y o  male presenting to outpatient physical therapy with Lumbar radiculopathy - Right   Pt presents with pain, decreased range of motion, decreased strength, and decreased tolerance to activity  Pt would benefit from skilled physical therapy to address limitations and to achieve goals  Thank you for this referral    Impairments: abnormal gait, abnormal or restricted ROM, lacks appropriate home exercise program and pain with function    Symptom irritability: high  Goals  ST  Patient will report 25% decrease in pain in 4 weeks  2  Patient will demonstrate 25% improvement in ROM in 4 weeks  3  Patient will demonstrate 1/2 grade improvement in strength in 4 weeks  LT  Patient will be able to perform IADLS without restriction or pain by discharge  2  Patient will be independent in HEP by discharge  3  Patient will be able to return to recreational/work duties without restriction or pain by discharge        Plan  Patient would benefit from: PT eval and skilled physical therapy  Planned modality interventions: biofeedback, cryotherapy and thermotherapy: hydrocollator packs  Planned therapy interventions: abdominal trunk stabilization, joint mobilization, manual therapy, neuromuscular re-education, patient education, strengthening, therapeutic activities, therapeutic exercise, therapeutic training and home exercise program  Frequency: 2x week  Duration in weeks: 6  Treatment plan discussed with: patient        Subjective Evaluation    History of Present Illness  Mechanism of injury: Pt reports with he has completed the medrol dosepak with minimal relief  He is scheduled to follow up with PCP on 3/22/21  Pt states that he has experienced chronic for many years, he began to have increased sxs in November but sxs were tolerable  He then slipped on ice in early January resulting in him seeking tx, he did not fall to the ground but the R LE was extended and he caught himself with his R UE  Current sxs are in the R low back/glute region described as constant throbbing, sxs radiate into the posterior LE from the glute to the knee described as tightness, sxs into the lateral calf described as sharp  Sitting will increase sxs, he states that he is only able to sleep for 2-3 hours at a time  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 which he states improves sxs  Pain  Current pain ratin  At best pain ratin  At worst pain ratin  Quality: sharp, throbbing and tight  Progression: no change    Patient Goals  Patient goals for therapy: decreased pain  Patient goal: Ability to sit without increase in pain           Objective     Concurrent Complaints  Negative for bladder dysfunction, bowel dysfunction and saddle (S4) numbness    Neurological Testing     Sensation     Lumbar     Right   Diminished: light touch    Comments   Right light touch: L4    Reflexes   Left   Patellar (L4): trace (1+)  Achilles (S1): trace (1+)    Right   Patellar (L4): trace (1+)  Achilles (S1): trace (1+)    Active Range of Motion     Lumbar   Flexion:  with pain Restriction level: maximal  Extension:  Restriction level: minimal  Left lateral flexion:  Restriction level: moderate  Right lateral flexion:  Restriction level: moderate    Strength/Myotome Testing     Left Hip   Planes of Motion   Flexion: 5    Right Hip   Planes of Motion   Flexion: 5    Left Knee   Flexion: 5  Extension: 5    Right Knee   Flexion: 5  Extension: 5    Left Ankle/Foot   Dorsiflexion: 5  Plantar flexion: 5  Great toe extension: 5    Right Ankle/Foot   Dorsiflexion: 5  Plantar flexion: 5  Great toe extension: 5    Tests     Lumbar     Left   Negative crossed SLR and passive SLR  Right   Positive passive SLR  Negative crossed SLR         Flowsheet Rows      Most Recent Value   PT/OT G-Codes   Current Score  57   Projected Score  61             Precautions: DMI, HTN      Manuals             MFD c prayer stretch, thoracic ext             IASTM                                       Neuro Re-Ed             Sciatic nerve glide             Peroneal nerve glide             Multifidi press                                                                 Ther Ex             Bike             HS stretch              Piriformis stretch             pball rollouts             Open books             HS curl             HR c tennis ball             Prone press up             Ther Activity                                       Gait Training                                       Modalities

## 2021-02-17 ENCOUNTER — OFFICE VISIT (OUTPATIENT)
Dept: PHYSICAL THERAPY | Facility: REHABILITATION | Age: 51
End: 2021-02-17
Payer: COMMERCIAL

## 2021-02-17 DIAGNOSIS — M54.16 LUMBAR RADICULOPATHY: Primary | ICD-10-CM

## 2021-02-17 PROCEDURE — 97110 THERAPEUTIC EXERCISES: CPT

## 2021-02-17 PROCEDURE — 97112 NEUROMUSCULAR REEDUCATION: CPT

## 2021-02-17 PROCEDURE — 97140 MANUAL THERAPY 1/> REGIONS: CPT

## 2021-02-17 NOTE — PROGRESS NOTES
Daily Note     Today's date: 2021  Patient name: Rozina Silva  : 1970  MRN: [id number]  Referring provider: LISA Holt  Dx:   Encounter Diagnosis     ICD-10-CM    1  Lumbar radiculopathy - Right  M54 16                   Subjective: Pt reports that he is doing okay today with moderate pain levels 7/10 in his R low back  Minimal relief was present since his IE with his HEP  Objective: See treatment diary below      Assessment: Pt still had trouble with R HS stretching today noting increased pain in his R piriformis region  B/l piriformis and HS tightness was present with continued education provided on the importance of his HEP  Limited lumbar mobility also present  Tolerated treatment fair  Patient demonstrated fatigue post treatment and would benefit from continued PT  A decrease in pain and increased flexibility present to end  Plan: Continue per plan of care        Precautions: DMI, HTN      Manuals             MFD c prayer stretch, thoracic ext MM 5'            IASTM MM 10'                                      Neuro Re-Ed             Sciatic nerve glide 10 [id number] sup            Peroneal nerve glide 10 seated 10sup            Multifidi press                                                                 Ther Ex             Bike 5'            HS stretch  20"x3 seated            Piriformis stretch 20"x3 ea            pball rollouts 10"x5 ea            Open books             HS curl             HR c tennis ball             Prone press up 2x10            Ther Activity                                       Gait Training                                       Modalities

## 2021-02-24 ENCOUNTER — APPOINTMENT (OUTPATIENT)
Dept: PHYSICAL THERAPY | Facility: REHABILITATION | Age: 51
End: 2021-02-24
Payer: COMMERCIAL

## 2021-03-01 ENCOUNTER — OFFICE VISIT (OUTPATIENT)
Dept: PHYSICAL THERAPY | Facility: REHABILITATION | Age: 51
End: 2021-03-01
Payer: COMMERCIAL

## 2021-03-01 DIAGNOSIS — M54.16 LUMBAR RADICULOPATHY: Primary | ICD-10-CM

## 2021-03-01 PROCEDURE — 97110 THERAPEUTIC EXERCISES: CPT | Performed by: PHYSICAL MEDICINE & REHABILITATION

## 2021-03-01 PROCEDURE — 97112 NEUROMUSCULAR REEDUCATION: CPT | Performed by: PHYSICAL MEDICINE & REHABILITATION

## 2021-03-01 NOTE — PROGRESS NOTES
Daily Note     Today's date: 3/1/2021  Patient name: Rozina Silva  : 1970  MRN: [id number]  Referring provider: LISA Holt  Dx:   Encounter Diagnosis     ICD-10-CM    1  Lumbar radiculopathy  M54 16        Start Time: 1257  Stop Time: 1335  Total time in clinic (min): 38 minutes    Subjective: Pts chief complaint of tension of the L lateral hip, he has a conflict in schedule and must leave by 1:30  Overall he is feeling better and he is able to sleep and sit  Objective: See treatment diary below      Assessment: Tolerated treatment well  Patient exhibited good technique with therapeutic exercises, would benefit from continued PT and improved quality and quantity of movement following manual tx  Plan: Progress treatment as tolerated         Precautions: DMI, HTN      Manuals 2/17 3/1           MFD c prayer stretch, thoracic ext MM 5'            IASTM MM 10'                                      Neuro Re-Ed             Sciatic nerve glide 10 [id number] sup            Peroneal nerve glide 10 seated 10sup            Multifidi press  tye  10 x 5"                                                               Ther Ex             Bike 5' 5'           HS stretch  20"x3 seated            Piriformis stretch 20"x3 ea            pball rollouts 10"x5 ea 10 x 5"           Open books  10 x 5"           Quadruped thoracic ext  10 x 5"           HS curl             HR c tennis ball             Prone press up 2x10            Ther Activity                                       Gait Training                                       Modalities

## 2021-03-03 ENCOUNTER — OFFICE VISIT (OUTPATIENT)
Dept: PHYSICAL THERAPY | Facility: REHABILITATION | Age: 51
End: 2021-03-03
Payer: COMMERCIAL

## 2021-03-03 DIAGNOSIS — M54.16 LUMBAR RADICULOPATHY: Primary | ICD-10-CM

## 2021-03-03 PROCEDURE — 97112 NEUROMUSCULAR REEDUCATION: CPT | Performed by: PHYSICAL MEDICINE & REHABILITATION

## 2021-03-03 PROCEDURE — 97110 THERAPEUTIC EXERCISES: CPT | Performed by: PHYSICAL MEDICINE & REHABILITATION

## 2021-03-03 NOTE — PROGRESS NOTES
Daily Note     Today's date: 3/3/2021  Patient name: Thelma Tena  : 1970  MRN: [id number]  Referring provider: LISA Johns  Dx:   Encounter Diagnosis     ICD-10-CM    1  Lumbar radiculopathy  M54 16        Start Time: 1600  Stop Time: 3348  Total time in clinic (min): 45 minutes    Subjective: Pt states that overall he is feeling pretty good today, chief complaint of mild soreness  Objective: See treatment diary below      Assessment: Tolerated treatment well  Patient demonstrated fatigue post treatment, exhibited good technique with therapeutic exercises and would benefit from continued PT  (+) neural tension R>L   Plan: Progress treatment as tolerated         Precautions: DMI, HTN      Manuals 2/17 3/1 3/3          MFD c prayer stretch, thoracic ext MM 5'            IASTM MM 10'                                      Neuro Re-Ed             Sciatic nerve glide 10 [id number] sup  2 x 10          Peroneal nerve glide 10 seated 10sup            Multifidi press  tye  10 x 5"                                                               Ther Ex             Bike 5' 5' 7'          HS stretch  20"x3 seated  3 x 20"          Piriformis stretch 20"x3 ea  seated  3 x 20"          pball rollouts 10"x5 ea 10 x 5" 10 x 5"          Open books  10 x 5" kneeling  10 x 5"          Quadruped thoracic ext  10 x 5" 10 x 5"          HS curl   B 25#  3 x 12            HR c tennis ball   20          Prone press up 2x10            Ther Activity                                       Gait Training                                       Modalities

## 2021-03-08 ENCOUNTER — OFFICE VISIT (OUTPATIENT)
Dept: PHYSICAL THERAPY | Facility: REHABILITATION | Age: 51
End: 2021-03-08
Payer: COMMERCIAL

## 2021-03-08 DIAGNOSIS — M54.16 LUMBAR RADICULOPATHY: Primary | ICD-10-CM

## 2021-03-08 PROCEDURE — 97110 THERAPEUTIC EXERCISES: CPT

## 2021-03-08 PROCEDURE — 97112 NEUROMUSCULAR REEDUCATION: CPT

## 2021-03-08 NOTE — PROGRESS NOTES
Daily Note     Today's date: 3/8/2021  Patient name: Kiran Noble  : 1970  MRN: [id number]  Referring provider: LISA Babcock  Dx:   Encounter Diagnosis     ICD-10-CM    1  Lumbar radiculopathy  M54 16                   Subjective: Pt reports that he is doing well today with no complaints of pain  Objective: See treatment diary below      Assessment: Tolerated treatment well  He continues to present with HS tightness and neural tension  Patient demonstrated fatigue post treatment, exhibited good technique with therapeutic exercises and would benefit from continued PT  Plan: Progress treatment as tolerated         Precautions: DMI, HTN      Manuals 2/17 3/1 3/3 3/8         MFD c prayer stretch, thoracic ext MM 5'            IASTM MM 10'                                      Neuro Re-Ed             Sciatic nerve glide 10 [id number] sup  2 x 10 2x10         Peroneal nerve glide 10 seated 10sup            Multifidi press  tye  10 x 5"                                                               Ther Ex             Bike 5' 5' 7' 7'         HS stretch  20"x3 seated  3 x 20" 3x20"         Piriformis stretch 20"x3 ea  seated  3 x 20" seated 20"x3         pball rollouts 10"x5 ea 10 x 5" 10 x 5" 10x5"         Open books  10 x 5" kneeling  10 x 5" kneeling 10x5"         Quadruped thoracic ext  10 x 5" 10 x 5" 10x5"         HS curl   B 25#  3 x 12   SL 25# 3x12         HR c tennis ball   20 20         Prone press up 2x10            Ther Activity                                       Gait Training                                       Modalities

## 2021-03-10 ENCOUNTER — APPOINTMENT (OUTPATIENT)
Dept: PHYSICAL THERAPY | Facility: REHABILITATION | Age: 51
End: 2021-03-10
Payer: COMMERCIAL

## 2021-03-15 ENCOUNTER — OFFICE VISIT (OUTPATIENT)
Dept: PHYSICAL THERAPY | Facility: REHABILITATION | Age: 51
End: 2021-03-15
Payer: COMMERCIAL

## 2021-03-15 DIAGNOSIS — M54.16 LUMBAR RADICULOPATHY: Primary | ICD-10-CM

## 2021-03-15 PROCEDURE — 97110 THERAPEUTIC EXERCISES: CPT | Performed by: PHYSICAL MEDICINE & REHABILITATION

## 2021-03-15 NOTE — PROGRESS NOTES
Daily Note     Today's date: 3/15/2021  Patient name: Taylor Boone  : 1970  MRN: [id number]  Referring provider: LISA Rascon  Dx:   Encounter Diagnosis     ICD-10-CM    1  Lumbar radiculopathy  M54 16        Start Time:   Stop Time: 1500  Total time in clinic (min): 43 minutes    Subjective: Pt states that he is feeling better, he has been doing his stretches at home as needed  Due to conflict in schedule he has to leave at 3:00 pm today  Objective: See treatment diary below      Assessment: Tolerated treatment well  Patient demonstrated fatigue post treatment, exhibited good technique with therapeutic exercises and would benefit from continued PT  Plan: Progress treatment as tolerated  Progress functional strengthening        Precautions: DMI, HTN      Manuals 2/17 3/1 3/3 3         MFD c prayer stretch, thoracic ext MM 5'            IASTM MM 10'                                      Neuro Re-Ed             Sciatic nerve glide 10 [id number] sup  2 x 10 2x10         Peroneal nerve glide 10 seated 10sup            Multifidi press  tye  10 x 5"   tye  15 5  2 x 10 ea          tye lift     10 ea                                                 Ther Ex             Bike 5' 5' 7' 7' 8'        HS stretch  20"x3 seated  3 x 20" 3x20"         Piriformis stretch 20"x3 ea  seated  3 x 20" seated 20"x3         pball rollouts 10"x5 ea 10 x 5" 10 x 5" 10x5"         Open books  10 x 5" kneeling  10 x 5" kneeling 10x5"         Quadruped thoracic ext  10 x 5" 10 x 5" 10x5" 10 x 5"        HS curl   B 25#  3 x 12   SL 25# 3x12         Sit to stand      c 20#  KB  2 x 10        HR c tennis ball   20 20 SL   2 x 10        Prone press up 2x10            Ther Activity                                       Gait Training                                       Modalities

## 2021-03-18 ENCOUNTER — APPOINTMENT (OUTPATIENT)
Dept: PHYSICAL THERAPY | Facility: REHABILITATION | Age: 51
End: 2021-03-18
Payer: COMMERCIAL

## 2021-03-22 ENCOUNTER — OFFICE VISIT (OUTPATIENT)
Dept: PAIN MEDICINE | Facility: CLINIC | Age: 51
End: 2021-03-22
Payer: COMMERCIAL

## 2021-03-22 ENCOUNTER — OFFICE VISIT (OUTPATIENT)
Dept: PHYSICAL THERAPY | Facility: REHABILITATION | Age: 51
End: 2021-03-22
Payer: COMMERCIAL

## 2021-03-22 VITALS
DIASTOLIC BLOOD PRESSURE: 80 MMHG | HEART RATE: 80 BPM | BODY MASS INDEX: 41.86 KG/M2 | SYSTOLIC BLOOD PRESSURE: 139 MMHG | HEIGHT: 71 IN | WEIGHT: 299 LBS | TEMPERATURE: 98.5 F

## 2021-03-22 DIAGNOSIS — M54.16 LUMBAR RADICULOPATHY: Primary | ICD-10-CM

## 2021-03-22 PROCEDURE — 99214 OFFICE O/P EST MOD 30 MIN: CPT | Performed by: NURSE PRACTITIONER

## 2021-03-22 PROCEDURE — 97110 THERAPEUTIC EXERCISES: CPT | Performed by: PHYSICAL MEDICINE & REHABILITATION

## 2021-03-22 PROCEDURE — 97112 NEUROMUSCULAR REEDUCATION: CPT | Performed by: PHYSICAL MEDICINE & REHABILITATION

## 2021-03-22 NOTE — PROGRESS NOTES
Assessment:  1  Lumbar radiculopathy        Plan:  1  I will order an MRI of the lumbar spine without contrast  2  The patient will continue with physical therapy as scheduled  3  The patient may continue Tylenol p r n  and should not exceed more than 3000 mg in 24 hours   4  The patient may continue ibuprofen p r n  and should not take more than 2400 mg in 24 hours   5  The patient will follow-up in 8 weeks or sooner if needed      M*Modal software was used to dictate this note  It may contain errors with dictating incorrect words or incorrect spelling  Please contact the provider directly with any questions  History of Present Illness: The patient is a 52 y o  male with a history of diabetes last seen on 2/8/21 who presents for a follow up office visit in regards to chronic right-sided lumbosacral back pain that would radiate into the posterolateral aspect of the right lower extremity  The patient denies left lower extremity symptoms, bowel or bladder incontinence or saddle anesthesia  The patient has been participating in physical therapy with significant improvement of his symptoms  He was also given a Medrol Dosepak last office visit which he states essentially resolved his lower extremity symptoms  He has since weaned off of his gabapentin and is no longer taking the medication  X-ray of the lumbar spine reveals narrowing of the L4-5 disc space, otherwise unremarkable  He offers no new complaints today's office visit  He rates his pain a 2/10 on the numeric pain rating scale  He states the pain is most bothersome the morning and he characterizes the pain as dull aching  I have personally reviewed and/or updated the patient's past medical history, past surgical history, family history, social history, current medications, allergies, and vital signs today  Review of Systems:    Review of Systems   Respiratory: Negative for shortness of breath  Cardiovascular: Negative for chest pain  Gastrointestinal: Negative for constipation, diarrhea, nausea and vomiting  Musculoskeletal: Negative for arthralgias, gait problem, joint swelling and myalgias  Skin: Negative for rash  Neurological: Negative for dizziness, seizures and weakness  All other systems reviewed and are negative          Past Medical History:   Diagnosis Date    Diabetes mellitus ([facility] [place] [address] )     Insulin pump in place 7/18/2019    Vitamin D deficiency        Past Surgical History:   Procedure Laterality Date    NO PAST SURGERIES         Family History   Problem Relation Age of Onset    Lung cancer Mother     Cancer Maternal Grandfather     Diabetes Maternal Grandfather     Diabetes Family     Other Family         cardiac disorder    Hypertension Family        Social History     Occupational History    Occupation: security   Tobacco Use    Smoking status: Never Smoker    Smokeless tobacco: Former User     Types: Chew    Tobacco comment: used chewing tobacco for 20 years   Substance and Sexual Activity    Alcohol use: Not Currently     Comment: socially    Drug use: No    Sexual activity: Not on file         Current Outpatient Medications:     aspirin 81 MG tablet, Take 1 tablet by mouth daily, Disp: , Rfl:     atorvastatin (LIPITOR) 80 mg tablet, Take 1 tablet (80 mg total) by mouth daily, Disp: 90 tablet, Rfl: 1    bimatoprost (LUMIGAN) 0 01 % ophthalmic drops, Apply 1 drop to eye Daily, Disp: , Rfl:     Blood Glucose Monitoring Suppl (ONE TOUCH ULTRA 2) w/Device KIT, by Does not apply route, Disp: , Rfl:     Cholecalciferol (VITAMIN D) 2000 units CAPS, Take by mouth, Disp: , Rfl:     Continuous Blood Gluc  (DEXCOM G6 ) SHARON, Use daily as directed for CGM, Disp: 1 Device, Rfl: 0    Continuous Blood Gluc Sensor (Dexcom G6 Sensor) MISC, Use daily as directed for CGM - Change every 10 days, Disp: 9 each, Rfl: 3    Continuous Blood Gluc Transmit (Dexcom G6 Transmitter) MISC, Use daily as directed for CGM - Change every 3 months, Disp: 1 each, Rfl: 3    gabapentin (NEURONTIN) 800 mg tablet, Take 1 tablet (800 mg total) by mouth 3 (three) times a day (Patient not taking: Reported on 3/22/2021), Disp: 90 tablet, Rfl: 1    glucagon (GLUCAGON EMERGENCY) 1 MG injection, Inject as directed Twice daily, Disp: , Rfl:     glucose blood (ONE TOUCH ULTRA TEST) test strip, Test 6x daily, Disp: 600 each, Rfl: 3    insulin aspart (NovoLOG FlexPen) 100 UNIT/ML injection pen, Inject up to a maximum of 105 units based on sliding scale and carb coverage  , Disp: 30 pen, Rfl: 2    insulin glargine (Lantus SoloStar) 100 units/mL injection pen, Inject 60 units of Lantus in the morning  Inject 60 units of Lantus in the evening , Disp: 30 pen, Rfl: 1    Insulin Pen Needle (BD PEN NEEDLE NADYA U/F) 32G X 4 MM MISC, by Does not apply route daily, Disp: , Rfl:     Insulin Pen Needle (BD Pen Needle Nadya U/F) 32G X 4 MM MISC, Use 4 per day, Disp: 100 each, Rfl: 1    methylPREDNISolone 4 MG tablet therapy pack, Use as directed on package (Patient not taking: Reported on 3/22/2021), Disp: 1 each, Rfl: 0    Multiple Vitamins-Minerals (MULTIVITAMINS PLUS ZINC) CAPS, Take by mouth, Disp: , Rfl:     ONETOUCH DELICA LANCETS FINE MISC, by Does not apply route, Disp: , Rfl:     ramipril (ALTACE) 10 MG capsule, Take 1 capsule (10 mg total) by mouth daily, Disp: 90 capsule, Rfl: 0    No Known Allergies    Physical Exam:    /80   Pulse 80   Temp 98 5 °F (36 9 °C)   Ht 5' 11" (1 803 m)   Wt 136 kg (299 lb)   BMI 41 70 kg/m²     Constitutional:overweight  Eyes:anicteric  HEENT:grossly intact  Neck:supple, symmetric, trachea midline and no masses   Pulmonary:even and unlabored  Cardiovascular:No edema or pitting edema present  Skin:Normal without rashes or lesions and well hydrated  Psychiatric:Mood and affect appropriate  Neurologic:Cranial Nerves II-XII grossly intact  Musculoskeletal:normal  Gait    Right lumbar paraspinal musculature mildly tender to palpation  Bilateral SI joints nontender to palpation  Bilateral lower extremity strength 5/5 in all muscle groups  Negative seated straight leg raise bilaterally      Imaging  MRI lumbar spine without contrast    (Results Pending)     LUMBAR SPINE     INDICATION:   M54 16: Radiculopathy, lumbar region      COMPARISON:  2/1/2010     VIEWS:  XR SPINE LUMBAR COMPLETE W BENDING MINIMUM 6 VIEWS  Images: 7     FINDINGS:     There are 5 non rib bearing lumbar vertebral bodies       There is no evidence of acute fracture or destructive osseous lesion  Mild chronic stable 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

## 2021-03-22 NOTE — PROGRESS NOTES
Daily Note     Today's date: 3/22/2021  Patient name: Taylor Boone  : 1970  MRN: [id number]  Referring provider: LISA Rascon  Dx:   Encounter Diagnosis     ICD-10-CM    1  Lumbar radiculopathy  M54 16        Start Time: 7669  Stop Time: 906  Total time in clinic (min): 56 minutes    Subjective: Pt states that he is feeling ok today, he did some work over the weekend resulting in some soreness  Pt denies LE sxs  Objective: See treatment diary below      Assessment: Tolerated treatment well  Patient demonstrated fatigue post treatment, exhibited good technique with therapeutic exercises and would benefit from continued PT  Improved quality and quantity of movement following tx  Plan: Progress treatment as tolerated         Precautions: DMI, HTN      Manuals 2/17 3/1 3/3 3/8  3/22       MFD c prayer stretch, thoracic ext MM 5'            IASTM MM 10'                                      Neuro Re-Ed             Sciatic nerve glide 10 [id number] sup  2 x 10 2x10         Peroneal nerve glide 10 seated 10sup            Multifidi press  tye  10 x 5"   tye  15 5  2 x 10 ea   tye  15 5  2 x 10       tye lift     10 ea                                                 Ther Ex             Bike 5' 5' 7' 7' 8' 8'       HS stretch  20"x3 seated  3 x 20" 3x20"  3 x 30"       Piriformis stretch 20"x3 ea  seated  3 x 20" seated 20"x3  seated  3 x 30"       pball rollouts 10"x5 ea 10 x 5" 10 x 5" 10x5"         Open books  10 x 5" kneeling  10 x 5" kneeling 10x5"  kneeling  10 x 5"       Quadruped thoracic ext  10 x 5" 10 x 5" 10x5" 10 x 5" 10 x 5"       HS curl   B 25#  3 x 12   SL 25# 3x12  SL   33#  20 ea       Sit to stand      c 20#  KB  2 x 10 c 20#  KB  2 x 10       LTR c leg crossed      5 x 5" ea       SKTC      5 x 20" ea       HR c tennis ball   20 20 SL   2 x 10        Prone press up 2x10            Ther Activity                                       Gait Training Modalities

## 2021-03-25 ENCOUNTER — APPOINTMENT (OUTPATIENT)
Dept: PHYSICAL THERAPY | Facility: REHABILITATION | Age: 51
End: 2021-03-25
Payer: COMMERCIAL

## 2021-03-27 DIAGNOSIS — E10.65 TYPE 1 DIABETES MELLITUS WITH HYPERGLYCEMIA (HCC): ICD-10-CM

## 2021-03-27 RX ORDER — INSULIN GLARGINE 100 [IU]/ML
INJECTION, SOLUTION SUBCUTANEOUS
Qty: 3 ML | Refills: 3 | Status: SHIPPED | OUTPATIENT
Start: 2021-03-27 | End: 2021-05-18 | Stop reason: SDUPTHER

## 2021-03-27 RX ORDER — INSULIN GLARGINE 100 [IU]/ML
INJECTION, SOLUTION SUBCUTANEOUS
Qty: 100 ML | Refills: 3 | Status: CANCELLED
Start: 2021-03-27

## 2021-03-27 NOTE — TELEPHONE ENCOUNTER
[phone]/Alex Wilde/[birth year]/Patient is completely out of insulin glargine (Lantus SoloStar) 100 units/mL injection pen   Patient's blood sugar was high this morning /[facility] [phone]

## 2021-03-28 DIAGNOSIS — I10 HYPERTENSION, UNSPECIFIED TYPE: ICD-10-CM

## 2021-03-29 RX ORDER — RAMIPRIL 10 MG/1
10 CAPSULE ORAL DAILY
Qty: 90 CAPSULE | Refills: 0 | Status: SHIPPED | OUTPATIENT
Start: 2021-03-29 | End: 2021-06-23 | Stop reason: SDUPTHER

## 2021-03-31 ENCOUNTER — APPOINTMENT (OUTPATIENT)
Dept: PHYSICAL THERAPY | Facility: REHABILITATION | Age: 51
End: 2021-03-31
Payer: COMMERCIAL

## 2021-04-12 ENCOUNTER — HOSPITAL ENCOUNTER (OUTPATIENT)
Dept: RADIOLOGY | Facility: IMAGING CENTER | Age: 51
Discharge: HOME | End: 2021-04-12

## 2021-04-12 DIAGNOSIS — M54.16 LUMBAR RADICULOPATHY: ICD-10-CM

## 2021-05-02 ENCOUNTER — HOSPITAL ENCOUNTER (OUTPATIENT)
Dept: RADIOLOGY | Facility: HOSPITAL | Age: 51
Discharge: HOME | End: 2021-05-02
Payer: COMMERCIAL

## 2021-05-02 PROCEDURE — 72148 MRI LUMBAR SPINE W/O DYE: CPT

## 2021-05-02 PROCEDURE — G1004 CDSM NDSC: HCPCS

## 2021-05-06 ENCOUNTER — TELEPHONE (OUTPATIENT)
Dept: PAIN MEDICINE | Facility: CLINIC | Age: 51
End: 2021-05-06

## 2021-05-06 NOTE — TELEPHONE ENCOUNTER
S/w pt  Advised of same  Pt is agreeable to procedure  He is taking ASA 81 mg  Advised pt that  would call to schedule appt  Denies blood thinners Pt verbalized understanding and appreciation      - - please call pt to schedule--

## 2021-05-06 NOTE — TELEPHONE ENCOUNTER
MRI of the lumbar spine revealsSmall right disc protrusion at L4-5 causing mild right foraminal narrowing, and right disc herniation at L5-S1 abutting the right S1 nerve root causing mild central and moderate right foraminal narrowing at this level    I can offer the patient a right L5 and S1 TFESI with Dr Doreen Ta

## 2021-05-07 DIAGNOSIS — M54.16 LUMBAR RADICULOPATHY: Primary | ICD-10-CM

## 2021-05-07 NOTE — TELEPHONE ENCOUNTER
Patient was contacted and scheduled for right L5 and S1 TFESI  All pre procedure instructions were given to patient   Nothing to eat or drink for 1 hour prior  Loose fitting clothing   Denies Anti Coag's   NSAIDS okay, ASA okay  Denies Antibx   Needs    Patient instructed to continue all routine medications   Patient instructed to contact our office if becomes sick   Refrain from any vaccines 2 weeks before & 2 weeks after  Insurance auth received but is not a guarantee of payment per your insurance company's authorization disclaimer and it is your responsibility to verify your benefits   COVID -19 screening complete

## 2021-05-13 ENCOUNTER — TELEPHONE (OUTPATIENT)
Dept: ENDOCRINOLOGY | Facility: CLINIC | Age: 51
End: 2021-05-13

## 2021-05-15 ENCOUNTER — LAB (OUTPATIENT)
Dept: LAB | Age: 51
End: 2021-05-15
Payer: COMMERCIAL

## 2021-05-15 DIAGNOSIS — E10.65 TYPE 1 DIABETES MELLITUS WITH HYPERGLYCEMIA (HCC): ICD-10-CM

## 2021-05-15 DIAGNOSIS — E78.2 MIXED HYPERLIPIDEMIA: ICD-10-CM

## 2021-05-15 LAB
ALBUMIN SERPL BCP-MCNC: 4 G/DL (ref 3.5–5)
ALP SERPL-CCNC: 82 U/L (ref 46–116)
ALT SERPL W P-5'-P-CCNC: 43 U/L (ref 12–78)
ANION GAP SERPL CALCULATED.3IONS-SCNC: 4 MMOL/L (ref 4–13)
AST SERPL W P-5'-P-CCNC: 19 U/L (ref 5–45)
BASOPHILS # BLD AUTO: 0.04 THOUSANDS/ΜL (ref 0–0.1)
BASOPHILS NFR BLD AUTO: 1 % (ref 0–1)
BILIRUB SERPL-MCNC: 0.76 MG/DL (ref 0.2–1)
BUN SERPL-MCNC: 20 MG/DL (ref 5–25)
CALCIUM SERPL-MCNC: 9.2 MG/DL (ref 8.3–10.1)
CHLORIDE SERPL-SCNC: 107 MMOL/L (ref 100–108)
CHOLEST SERPL-MCNC: 111 MG/DL (ref 50–200)
CO2 SERPL-SCNC: 28 MMOL/L (ref 21–32)
CREAT SERPL-MCNC: 0.86 MG/DL (ref 0.6–1.3)
CREAT UR-MCNC: 168 MG/DL
EOSINOPHIL # BLD AUTO: 0.15 THOUSAND/ΜL (ref 0–0.61)
EOSINOPHIL NFR BLD AUTO: 3 % (ref 0–6)
ERYTHROCYTE [DISTWIDTH] IN BLOOD BY AUTOMATED COUNT: 12.6 % (ref 11.6–15.1)
EST. AVERAGE GLUCOSE BLD GHB EST-MCNC: 146 MG/DL
GFR SERPL CREATININE-BSD FRML MDRD: 102 ML/MIN/1.73SQ M
GLUCOSE P FAST SERPL-MCNC: 160 MG/DL (ref 65–99)
HBA1C MFR BLD: 6.7 %
HCT VFR BLD AUTO: 44.6 % (ref 36.5–49.3)
HDLC SERPL-MCNC: 30 MG/DL
HGB BLD-MCNC: 14.5 G/DL (ref 12–17)
IMM GRANULOCYTES # BLD AUTO: 0.01 THOUSAND/UL (ref 0–0.2)
IMM GRANULOCYTES NFR BLD AUTO: 0 % (ref 0–2)
LDLC SERPL CALC-MCNC: 64 MG/DL (ref 0–100)
LYMPHOCYTES # BLD AUTO: 1.59 THOUSANDS/ΜL (ref 0.6–4.47)
LYMPHOCYTES NFR BLD AUTO: 36 % (ref 14–44)
MCH RBC QN AUTO: 28.3 PG (ref 26.8–34.3)
MCHC RBC AUTO-ENTMCNC: 32.5 G/DL (ref 31.4–37.4)
MCV RBC AUTO: 87 FL (ref 82–98)
MICROALBUMIN UR-MCNC: 20.6 MG/L (ref 0–20)
MICROALBUMIN/CREAT 24H UR: 12 MG/G CREATININE (ref 0–30)
MONOCYTES # BLD AUTO: 0.51 THOUSAND/ΜL (ref 0.17–1.22)
MONOCYTES NFR BLD AUTO: 11 % (ref 4–12)
NEUTROPHILS # BLD AUTO: 2.17 THOUSANDS/ΜL (ref 1.85–7.62)
NEUTS SEG NFR BLD AUTO: 49 % (ref 43–75)
NRBC BLD AUTO-RTO: 0 /100 WBCS
PLATELET # BLD AUTO: 240 THOUSANDS/UL (ref 149–390)
PMV BLD AUTO: 9.9 FL (ref 8.9–12.7)
POTASSIUM SERPL-SCNC: 4.4 MMOL/L (ref 3.5–5.3)
PROT SERPL-MCNC: 7.3 G/DL (ref 6.4–8.2)
RBC # BLD AUTO: 5.13 MILLION/UL (ref 3.88–5.62)
SODIUM SERPL-SCNC: 139 MMOL/L (ref 136–145)
TRIGL SERPL-MCNC: 84 MG/DL
WBC # BLD AUTO: 4.47 THOUSAND/UL (ref 4.31–10.16)

## 2021-05-15 PROCEDURE — 36415 COLL VENOUS BLD VENIPUNCTURE: CPT

## 2021-05-15 PROCEDURE — 80061 LIPID PANEL: CPT

## 2021-05-15 PROCEDURE — 82570 ASSAY OF URINE CREATININE: CPT

## 2021-05-15 PROCEDURE — 80053 COMPREHEN METABOLIC PANEL: CPT

## 2021-05-15 PROCEDURE — 82043 UR ALBUMIN QUANTITATIVE: CPT

## 2021-05-15 PROCEDURE — 85025 COMPLETE CBC W/AUTO DIFF WBC: CPT

## 2021-05-15 PROCEDURE — 83036 HEMOGLOBIN GLYCOSYLATED A1C: CPT

## 2021-05-18 ENCOUNTER — OFFICE VISIT (OUTPATIENT)
Dept: ENDOCRINOLOGY | Facility: CLINIC | Age: 51
End: 2021-05-18
Payer: COMMERCIAL

## 2021-05-18 VITALS
HEART RATE: 87 BPM | HEIGHT: 71 IN | OXYGEN SATURATION: 98 % | WEIGHT: 287.8 LBS | DIASTOLIC BLOOD PRESSURE: 72 MMHG | BODY MASS INDEX: 40.29 KG/M2 | SYSTOLIC BLOOD PRESSURE: 142 MMHG

## 2021-05-18 DIAGNOSIS — E78.2 MIXED HYPERLIPIDEMIA: ICD-10-CM

## 2021-05-18 DIAGNOSIS — E04.1 THYROID NODULE: ICD-10-CM

## 2021-05-18 DIAGNOSIS — E10.65 TYPE 1 DIABETES MELLITUS WITH HYPERGLYCEMIA (HCC): Primary | ICD-10-CM

## 2021-05-18 DIAGNOSIS — I10 BENIGN ESSENTIAL HYPERTENSION: ICD-10-CM

## 2021-05-18 PROCEDURE — 99214 OFFICE O/P EST MOD 30 MIN: CPT | Performed by: NURSE PRACTITIONER

## 2021-05-18 PROCEDURE — 95251 CONT GLUC MNTR ANALYSIS I&R: CPT | Performed by: NURSE PRACTITIONER

## 2021-05-18 RX ORDER — INSULIN GLARGINE 100 [IU]/ML
INJECTION, SOLUTION SUBCUTANEOUS
Qty: 54 ML | Refills: 1 | Status: SHIPPED | OUTPATIENT
Start: 2021-05-18 | End: 2021-09-15 | Stop reason: SDUPTHER

## 2021-05-18 RX ORDER — INSULIN ASPART 100 [IU]/ML
INJECTION, SOLUTION INTRAVENOUS; SUBCUTANEOUS
Qty: 90 ML | Refills: 1 | Status: SHIPPED | OUTPATIENT
Start: 2021-05-18 | End: 2021-11-27 | Stop reason: SDUPTHER

## 2021-05-18 NOTE — PROGRESS NOTES
Established Patient Progress Note      Chief Complaint   Patient presents with    Diabetes Type 1        History of Present Illness:   Ciro Reina is a 54 y o  male with HTN, HLD, thyroid nodule, and type 1 diabetes with long term use of insulin since 1999  Reports complications of neuropathy and retinopathy  Denies recent illness or hospitalizations  Denies recent severe hypoglycemic or severe hyperglycemic episodes  Denies any issues with his current regimen  home glucose monitoring: are performed regularly with Dexcom G6  Patient had been on a Medtronic 670 G for  Approximately 5 years  He stopped using the pump on June 25, 2020 in his return to multiple daily injections along with continuous glucose monitor use  Current regimen:   Lantus 60 units BID  Novolog 25-25-25 plus sliding scale    Daphine Key   Device used Dexcom G6  Home use     Indication   Type 1 Diabetes      More than 72 hours of data was reviewed  Report to be scanned to chart  Date Range: May 5, 2021-May 18, 2021    Analysis of data:   Average Glucose:  160 mg/dL  Coefficient of Variation: 35 9%   SD : 57 mg/dL   Time in Target Range:  67 6%   Time Above Range:  38 6%   Time Below Range:  2 6%     Interpretation of data: Patient has a pattern of nighttime highs  Some of this is due to insulin timing  He may also be underestimating amount of carbs consumed  Overall, much improved  He has lost approximately 10 lbs since his last visit  Physical activity limited d/t back pain for which he follows with pain management  Hemoglobin A1c has improved to 6 7% as of 05/15/2021  Last Eye Exam:   Every 3 months for moderate diabetic retinopathy and glaucoma  Last Foot Exam:  Up-to-date; 08/12/2020     For hypertension, he takes ramipril  He denies headache and cough  For hyperlipidemia, he takes atorvastatin  He denies myalgias    Patient Active Problem List   Diagnosis    Type 1 diabetes mellitus with hyperglycemia ([facility] [address] )  Mixed hyperlipidemia    Thyroid nodule    Benign essential hypertension    Erectile dysfunction of non-organic origin    Hypoglycemia    Other specified congenital malformations of skin    Low back pain with sciatica    Somatic dysfunction of lumbar region    Strain of right psoas muscle    Voiding dysfunction    Lumbar radiculopathy      Past Medical History:   Diagnosis Date    Diabetes mellitus ([address] )     Insulin pump in place 7/18/2019    Vitamin D deficiency       Past Surgical History:   Procedure Laterality Date    NO PAST SURGERIES        Family History   Problem Relation Age of Onset    Lung cancer Mother     Cancer Maternal Grandfather     Diabetes Maternal Grandfather     Diabetes Family     Other Family         cardiac disorder    Hypertension Family      Social History     Tobacco Use    Smoking status: Never Smoker    Smokeless tobacco: Former User     Types: Chew    Tobacco comment: used chewing tobacco for 20 years   Substance Use Topics    Alcohol use: Not Currently     Comment: socially     No Known Allergies      Current Outpatient Medications:     aspirin 81 MG tablet, Take 1 tablet by mouth daily, Disp: , Rfl:     atorvastatin (LIPITOR) 80 mg tablet, Take 1 tablet (80 mg total) by mouth daily, Disp: 90 tablet, Rfl: 1    bimatoprost (LUMIGAN) 0 01 % ophthalmic drops, Apply 1 drop to eye Daily, Disp: , Rfl:     Blood Glucose Monitoring Suppl (ONE TOUCH ULTRA 2) w/Device KIT, by Does not apply route, Disp: , Rfl:     Cholecalciferol (VITAMIN D) 2000 units CAPS, Take by mouth, Disp: , Rfl:     Continuous Blood Gluc  (DEXCOM G6 ) SHARON, Use daily as directed for CGM, Disp: 1 Device, Rfl: 0    Continuous Blood Gluc Sensor (Dexcom G6 Sensor) MISC, Use daily as directed for CGM - Change every 10 days, Disp: 9 each, Rfl: 3    Continuous Blood Gluc Transmit (Dexcom G6 Transmitter) MISC, Use daily as directed for CGM - Change every 3 months, Disp: 1 each, Rfl: 3    gabapentin (NEURONTIN) 800 mg tablet, Take 1 tablet (800 mg total) by mouth 3 (three) times a day, Disp: 90 tablet, Rfl: 1    glucagon (GLUCAGON EMERGENCY) 1 MG injection, Inject as directed Twice daily, Disp: , Rfl:     glucose blood (ONE TOUCH ULTRA TEST) test strip, Test 6x daily, Disp: 600 each, Rfl: 3    insulin aspart (NovoLOG FlexPen) 100 UNIT/ML injection pen, Inject up to a maximum of 105 units based on sliding scale and carb coverage  , Disp: 90 mL, Rfl: 1    insulin glargine (Lantus SoloStar) 100 units/mL injection pen, Inject 60 units of Lantus in the morning  Inject 60 units of Lantus in the evening , Disp: 54 mL, Rfl: 1    Insulin Pen Needle (BD PEN NEEDLE NADYA U/F) 32G X 4 MM MISC, by Does not apply route daily, Disp: , Rfl:     Insulin Pen Needle (BD Pen Needle Nadya U/F) 32G X 4 MM MISC, Use 4 per day, Disp: 100 each, Rfl: 1    Multiple Vitamins-Minerals (MULTIVITAMINS PLUS ZINC) CAPS, Take by mouth, Disp: , Rfl:     ONETOUCH DELICA LANCETS FINE MISC, by Does not apply route, Disp: , Rfl:     ramipril (ALTACE) 10 MG capsule, Take 1 capsule (10 mg total) by mouth daily, Disp: 90 capsule, Rfl: 0    methylPREDNISolone 4 MG tablet therapy pack, Use as directed on package (Patient not taking: Reported on 3/22/2021), Disp: 1 each, Rfl: 0    Review of Systems   Constitutional: Negative for activity change, appetite change, fatigue and unexpected weight change  HENT: Negative for sore throat, trouble swallowing and voice change  Eyes: Negative for visual disturbance  Respiratory: Negative for cough, chest tightness and shortness of breath  Cardiovascular: Negative for chest pain, palpitations and leg swelling  Gastrointestinal: Negative for constipation, diarrhea, nausea and vomiting  Endocrine: Negative for polydipsia, polyphagia and polyuria  Genitourinary: Negative for frequency  Musculoskeletal: Positive for arthralgias and back pain   Negative for gait problem and myalgias  Skin: Negative for wound  Allergic/Immunologic: Negative for environmental allergies and food allergies  Neurological: Positive for numbness  Negative for dizziness, weakness, light-headedness and headaches  Hematological: Does not bruise/bleed easily  Psychiatric/Behavioral: Negative for dysphoric mood and sleep disturbance  The patient is not nervous/anxious  Physical Exam:  Body mass index is 40 14 kg/m²  /72 (BP Location: Right arm, Cuff Size: Adult)   Pulse 87   Ht 5' 11" (1 803 m)   Wt 131 kg (287 lb 12 8 oz)   SpO2 98%   BMI 40 14 kg/m²    Wt Readings from Last 3 Encounters:   05/18/21 131 kg (287 lb 12 8 oz)   03/22/21 136 kg (299 lb)   02/08/21 136 kg (299 lb)       Physical Exam  Vitals signs reviewed  Constitutional:       General: He is not in acute distress  Appearance: He is well-developed  He is obese  He is not ill-appearing  HENT:      Head: Normocephalic and atraumatic  Comments: Mask in place  Eyes:      Pupils: Pupils are equal, round, and reactive to light  Neck:      Musculoskeletal: Normal range of motion and neck supple  Thyroid: No thyromegaly  Cardiovascular:      Rate and Rhythm: Normal rate and regular rhythm  Pulses: Normal pulses  Heart sounds: Normal heart sounds  Pulmonary:      Effort: Pulmonary effort is normal       Breath sounds: Normal breath sounds  Abdominal:      General: Bowel sounds are normal  There is no distension  Palpations: Abdomen is soft  Tenderness: There is no abdominal tenderness  Musculoskeletal:      Right lower leg: No edema  Left lower leg: No edema  Lymphadenopathy:      Cervical: No cervical adenopathy  Skin:     General: Skin is warm and dry  Capillary Refill: Capillary refill takes less than 2 seconds  Neurological:      Mental Status: He is alert and oriented to person, place, and time        Gait: Gait normal    Psychiatric:         Mood and Affect: Mood normal          Behavior: Behavior normal          Thought Content: Thought content normal          Judgment: Judgment normal            Labs:   Lab Results   Component Value Date    HGBA1C 6 7 (H) 05/15/2021    HGBA1C 7 0 (H) 08/11/2020    HGBA1C 7 7 (H) 07/25/2019     Lab Results   Component Value Date    CREATININE 0 86 05/15/2021    CREATININE 1 04 08/11/2020    CREATININE 0 98 07/25/2019    BUN 20 05/15/2021     11/06/2015    K 4 4 05/15/2021     05/15/2021    CO2 28 05/15/2021     eGFR   Date Value Ref Range Status   05/15/2021 102 ml/min/1 73sq m Final     Lab Results   Component Value Date    CHOL 148 08/04/2015    HDL 30 (L) 05/15/2021    TRIG 84 05/15/2021     Lab Results   Component Value Date    ALT 43 05/15/2021    AST 19 05/15/2021    ALKPHOS 82 05/15/2021    BILITOT 0 61 11/06/2015     Lab Results   Component Value Date    QWJ5ZSRLPUIZ 2 150 08/11/2020    NOX2NCHZLRZW 1 460 07/25/2019    NAA1DZNTRUAA 1 580 04/04/2018     Lab Results   Component Value Date    FREET4 0 88 08/11/2020       Impression & Plan:    Problem List Items Addressed This Visit        Endocrine    Type 1 diabetes mellitus with hyperglycemia ([facility] [address] ) - Primary       Patient control continues to improve  Instructed him not to adjust his basal insulin doses on a day-to-day basis  Continue with 60 units b i d  and 20-25 units 3 times daily prior to meals  Patient knows to notify the office with any episodes of hypoglycemia or persistent hyperglycemia  Continue to focus on diet and exercise  Lab Results   Component Value Date    HGBA1C 6 7 (H) 05/15/2021            Relevant Medications    insulin aspart (NovoLOG FlexPen) 100 UNIT/ML injection pen    insulin glargine (Lantus SoloStar) 100 units/mL injection pen    Other Relevant Orders    HEMOGLOBIN A1C W/ EAG ESTIMATION Lab Collect    Thyroid nodule       Reminded patient to complete ultrasound of thyroid              Cardiovascular and Mediastinum    Benign essential hypertension       BP is 142/72  Continue current regimen  Continue to focus on diet lifestyle modification  Other    Mixed hyperlipidemia       Reviewed lipid panel with patient  Continue statin  Low HDL  He can improve this with increased physical activity  Component      Latest Ref Rng & Units 5/15/2021   Cholesterol      50 - 200 mg/dL 111   Triglycerides      <=150 mg/dL 84   HDL      >=40 mg/dL 30 (L)   LDL Calculated      0 - 100 mg/dL 64                  Orders Placed This Encounter   Procedures    HEMOGLOBIN A1C W/ EAG ESTIMATION Lab Collect     Standing Status:   Future     Standing Expiration Date:   5/18/2022       There are no Patient Instructions on file for this visit  Discussed with the patient and all questioned fully answered  He will call me if any problems arise  Follow-up appointment in 3 months       Counseled patient on diagnostic results, prognosis, risk and benefit of treatment options, instruction for management, importance of treatment compliance, Risk  factor reduction and impressions    LISA Padilla

## 2021-05-18 NOTE — ASSESSMENT & PLAN NOTE
Patient control continues to improve  Instructed him not to adjust his basal insulin doses on a day-to-day basis  Continue with 60 units b i d  and 20-25 units 3 times daily prior to meals  Patient knows to notify the office with any episodes of hypoglycemia or persistent hyperglycemia  Continue to focus on diet and exercise    Lab Results   Component Value Date    HGBA1C 6 7 (H) 05/15/2021

## 2021-05-18 NOTE — ASSESSMENT & PLAN NOTE
Reviewed lipid panel with patient  Continue statin  Low HDL  He can improve this with increased physical activity      Component      Latest Ref Rng & Units 5/15/2021   Cholesterol      50 - 200 mg/dL 111   Triglycerides      <=150 mg/dL 84   HDL      >=40 mg/dL 30 (L)   LDL Calculated      0 - 100 mg/dL 64

## 2021-05-28 ENCOUNTER — TELEMEDICINE (OUTPATIENT)
Dept: FAMILY MEDICINE CLINIC | Facility: CLINIC | Age: 51
End: 2021-05-28
Payer: COMMERCIAL

## 2021-05-28 DIAGNOSIS — R50.9 FEVER, UNSPECIFIED FEVER CAUSE: ICD-10-CM

## 2021-05-28 DIAGNOSIS — Z20.822 ENCOUNTER FOR LABORATORY TESTING FOR COVID-19 VIRUS: ICD-10-CM

## 2021-05-28 DIAGNOSIS — J02.9 SORE THROAT: ICD-10-CM

## 2021-05-28 DIAGNOSIS — R43.0 ANOSMIA: Primary | ICD-10-CM

## 2021-05-28 DIAGNOSIS — R43.0 ANOSMIA: ICD-10-CM

## 2021-05-28 PROCEDURE — U0003 INFECTIOUS AGENT DETECTION BY NUCLEIC ACID (DNA OR RNA); SEVERE ACUTE RESPIRATORY SYNDROME CORONAVIRUS 2 (SARS-COV-2) (CORONAVIRUS DISEASE [COVID-19]), AMPLIFIED PROBE TECHNIQUE, MAKING USE OF HIGH THROUGHPUT TECHNOLOGIES AS DESCRIBED BY CMS-2020-01-R: HCPCS | Performed by: FAMILY MEDICINE

## 2021-05-28 PROCEDURE — U0005 INFEC AGEN DETEC AMPLI PROBE: HCPCS | Performed by: FAMILY MEDICINE

## 2021-05-28 PROCEDURE — 99214 OFFICE O/P EST MOD 30 MIN: CPT | Performed by: FAMILY MEDICINE

## 2021-05-28 RX ORDER — AZITHROMYCIN 250 MG/1
TABLET, FILM COATED ORAL
Qty: 6 TABLET | Refills: 0 | Status: SHIPPED | OUTPATIENT
Start: 2021-05-28 | End: 2021-06-02

## 2021-05-28 NOTE — PATIENT INSTRUCTIONS
[facility] [phone]  no taste, fever, sore throat    Rec covid 19 testing and rec self isolation util results received  Take Tylenol prn st and fever and start abx as directed  Call if any sob/resp distress  Son was sick and was tested for covid and negative and started being sick Wednesday  Anosmia started last night  Temp was 100 5  Covid 19 vaccinated - Moderna January

## 2021-05-28 NOTE — PROGRESS NOTES
COVID-19 Outpatient Progress Note    Assessment/Plan:    Problem List Items Addressed This Visit     None      Visit Diagnoses     Anosmia    -  Primary    Relevant Orders    Novel Coronavirus (Covid-19),PCR [facility] - Collected at [facility] or [facility]    Fever, unspecified fever cause        Relevant Medications    azithromycin (Zithromax) 250 mg tablet    Other Relevant Orders    Novel Coronavirus (Covid-19),PCR [facility] - Collected at [facility] or [facility]    Sore throat        Relevant Medications    azithromycin (Zithromax) 250 mg tablet    Other Relevant Orders    Novel Coronavirus (Covid-19),PCR [facility] - Collected at [facility] or [facility]    Encounter for laboratory testing for COVID-19 virus        Relevant Orders    Novel Coronavirus (Covid-19),PCR [facility] - Collected at [facility] or [facility]         Disposition:     I referred patient to one of our centralized sites for a COVID-19 swab  I have spent 15 minutes directly with the patient  Greater than 50% of this time was spent in counseling/coordination of care regarding: instructions for management and impressions  Encounter provider Darleen Hugo DO    Provider located at [address] [place] [id number] [phone]    Recent Visits  No visits were found meeting these conditions  Showing recent visits within past 7 days and meeting all other requirements     Today's Visits  Date Type Provider Dept   05/28/21 Telemedicine Jody Corral [facility]   Showing today's visits and meeting all other requirements     Future Appointments  No visits were found meeting these conditions  Showing future appointments within next 150 days and meeting all other requirements      This virtual check-in was done via [employer] and patient was informed that this is a secure, HIPAA-compliant platform  He agrees to proceed      Patient agrees to participate in a virtual check in via telephone or video visit instead of presenting to the office to address urgent/immediate medical needs  Patient is aware this is a billable service  After connecting through [facility], the patient was identified by name and date of birth  Jacky Connolly was informed that this was a telemedicine visit and that the exam was being conducted confidentially over secure lines  My office door was closed  No one else was in the room  Jacky Connolly acknowledged consent and understanding of privacy and security of the telemedicine visit  I informed the patient that I have reviewed his record in Epic and presented the opportunity for him to ask any questions regarding the visit today  The patient agreed to participate  Subjective:   Jacky Connolly is a 54 y o  male who is concerned about COVID-19  Patient's symptoms include fever and sore throat       Date of symptom onset: 5/28/2021    Exposure:   Contact with a person who is under investigation (PUI) for or who is positive for COVID-19 within the last 14 days?: No    Hospitalized recently for fever and/or lower respiratory symptoms?: No      Currently a healthcare worker that is involved in direct patient care?: No      Works in a special setting where the risk of COVID-19 transmission may be high? (this may include long-term care, correctional and correction facilities; homeless shelters; assisted-living facilities and group homes ): No      Resident in a special setting where the risk of COVID-19 transmission may be high? (this may include long-term care, correctional and correction facilities; homeless shelters; assisted-living facilities and group homes ): No      No results found for: Cliff Figueroa, [address] [facility], [address] [place] [address],[address], [place] [address]  Past Medical History:   Diagnosis Date    Diabetes mellitus ([facility] [address] )     Insulin pump in place 7/18/2019    Vitamin D deficiency      Past Surgical History:   Procedure Laterality Date    NO PAST SURGERIES       Current Outpatient Medications   Medication Sig Dispense Refill    aspirin 81 MG tablet Take 1 tablet by mouth daily      atorvastatin (LIPITOR) 80 mg tablet Take 1 tablet (80 mg total) by mouth daily 90 tablet 1    azithromycin (Zithromax) 250 mg tablet Take 2 tablets (500 mg total) by mouth daily for 1 day, THEN 1 tablet (250 mg total) daily for 4 days  6 tablet 0    bimatoprost (LUMIGAN) 0 01 % ophthalmic drops Apply 1 drop to eye Daily      Blood Glucose Monitoring Suppl (ONE TOUCH ULTRA 2) w/Device KIT by Does not apply route      Cholecalciferol (VITAMIN D) 2000 units CAPS Take by mouth      Continuous Blood Gluc  (DEXCOM G6 ) SHARON Use daily as directed for CGM 1 Device 0    Continuous Blood Gluc Sensor (Dexcom G6 Sensor) MISC Use daily as directed for CGM - Change every 10 days 9 each 3    Continuous Blood Gluc Transmit (Dexcom G6 Transmitter) MISC Use daily as directed for CGM - Change every 3 months 1 each 3    gabapentin (NEURONTIN) 800 mg tablet Take 1 tablet (800 mg total) by mouth 3 (three) times a day 90 tablet 1    glucagon (GLUCAGON EMERGENCY) 1 MG injection Inject as directed Twice daily      glucose blood (ONE TOUCH ULTRA TEST) test strip Test 6x daily 600 each 3    insulin aspart (NovoLOG FlexPen) 100 UNIT/ML injection pen Inject up to a maximum of 105 units based on sliding scale and carb coverage  90 mL 1    insulin glargine (Lantus SoloStar) 100 units/mL injection pen Inject 60 units of Lantus in the morning  Inject 60 units of Lantus in the evening   54 mL 1    Insulin Pen Needle (BD PEN NEEDLE NADYA U/F) 32G X 4 MM MISC by Does not apply route daily      Insulin Pen Needle (BD Pen Needle Nadya U/F) 32G X 4 MM MISC Use 4 per day 100 each 1    methylPREDNISolone 4 MG tablet therapy pack Use as directed on package (Patient not taking: Reported on 3/22/2021) 1 each 0    Multiple Vitamins-Minerals (MULTIVITAMINS PLUS ZINC) CAPS Take by mouth      ONETOUCH DELICA LANCETS FINE MISC by Does not apply route      ramipril (ALTACE) 10 MG capsule Take 1 capsule (10 mg total) by mouth daily 90 capsule 0     No current facility-administered medications for this visit  No Known Allergies    Review of Systems   Constitutional: Positive for fever  HENT: Positive for sore throat  Anosmia   Eyes: Negative  Respiratory: Negative  Cardiovascular: Negative  Gastrointestinal: Negative  Endocrine: Negative  Genitourinary: Negative  Musculoskeletal: Negative  Skin: Negative  Allergic/Immunologic: Negative  Neurological: Negative  Hematological: Negative  Psychiatric/Behavioral: Negative  Objective: There were no vitals filed for this visit  Physical Exam  Constitutional:       Appearance: Normal appearance  HENT:      Head: Normocephalic and atraumatic  Eyes:      Conjunctiva/sclera: Conjunctivae normal    Pulmonary:      Effort: Pulmonary effort is normal  No respiratory distress  Skin:     Coloration: Skin is not pale  Neurological:      General: No focal deficit present  Mental Status: He is alert and oriented to person, place, and time  Psychiatric:         Mood and Affect: Mood normal          Behavior: Behavior normal          Thought Content: Thought content normal          Judgment: Judgment normal        VIRTUAL VISIT DISCLAIMER    Keith Rogers acknowledges that he has consented to an online visit or consultation  He understands that the online visit is based solely on information provided by him, and that, in the absence of a face-to-face physical evaluation by the physician, the diagnosis he receives is both limited and provisional in terms of accuracy and completeness  This is not intended to replace a full medical face-to-face evaluation by the physician  Keith Rogers understands and accepts these terms

## 2021-05-29 LAB — SARS-COV-2 RNA RESP QL NAA+PROBE: NEGATIVE

## 2021-06-08 ENCOUNTER — HOSPITAL ENCOUNTER (OUTPATIENT)
Dept: RADIOLOGY | Facility: CLINIC | Age: 51
Discharge: HOME | End: 2021-06-08
Attending: ANESTHESIOLOGY
Payer: COMMERCIAL

## 2021-06-08 VITALS
TEMPERATURE: 99 F | RESPIRATION RATE: 18 BRPM | OXYGEN SATURATION: 96 % | DIASTOLIC BLOOD PRESSURE: 78 MMHG | SYSTOLIC BLOOD PRESSURE: 135 MMHG | HEART RATE: 74 BPM

## 2021-06-08 DIAGNOSIS — M54.16 LUMBAR RADICULOPATHY: ICD-10-CM

## 2021-06-08 PROCEDURE — 64484 NJX AA&/STRD TFRM EPI L/S EA: CPT | Performed by: ANESTHESIOLOGY

## 2021-06-08 PROCEDURE — 64483 NJX AA&/STRD TFRM EPI L/S 1: CPT | Performed by: ANESTHESIOLOGY

## 2021-06-08 RX ORDER — PAPAVERINE HCL 150 MG
20 CAPSULE, EXTENDED RELEASE ORAL ONCE
Status: COMPLETED | OUTPATIENT
Start: 2021-06-08 | End: 2021-06-08

## 2021-06-08 RX ADMIN — DEXAMETHASONE SODIUM PHOSPHATE 20 MG: 10 INJECTION, SOLUTION INTRAMUSCULAR; INTRAVENOUS at 08:14

## 2021-06-08 RX ADMIN — IOHEXOL 1 ML: 300 INJECTION, SOLUTION INTRAVENOUS at 08:13

## 2021-06-08 RX ADMIN — LIDOCAINE HYDROCHLORIDE 2 ML: 20 INJECTION, SOLUTION EPIDURAL; INFILTRATION; INTRACAUDAL; PERINEURAL at 08:13

## 2021-06-08 NOTE — DISCHARGE INSTRUCTIONS
Epidural Steroid Injection   WHAT YOU NEED TO KNOW:   An epidural steroid injection (ED) is a procedure to inject steroid medicine into the epidural space  The epidural space is between your spinal cord and vertebrae  Steroids reduce inflammation and fluid buildup in your spine that may be causing pain  You may be given pain medicine along with the steroids  ACTIVITY  · Do not drive or operate machinery today  · No strenuous activity today - bending, lifting, etc   · You may resume normal activites starting tomorrow - start slowly and as tolerated  · You may shower today, but no tub baths or hot tubs  · You may have numbness for several hours from the local anesthetic  Please use caution and common sense, especially with weight-bearing activities  CARE OF THE INJECTION SITE  · If you have soreness or pain, apply ice to the area today (20 minutes on/20 minutes off)  · Starting tomorrow, you may use warm, moist heat or ice if needed  · You may have an increase or change in your discomfort for 36-48 hours after your treatment  · Apply ice and continue with any pain medication you have been prescribed  · Notify [facility] if you have any of the following: redness, drainage, swelling, headache, stiff neck or fever above 100°F     SPECIAL INSTRUCTIONS  · Our office will contact you in approximately 7 days for a progress report  MEDICATIONS  · Continue to take all routine medications  · Our office may have instructed you to hold some medications  As no general anesthesia was used in today's procedure, you should not experience any side effects related to anesthesia  If you have a problem specifically related to your procedure, please call our office at [phone]  Problems not related to your procedure should be directed to your primary care physician

## 2021-06-08 NOTE — H&P
History of Present Illness: The patient is a 54 y o  male who presents with complaints of Low back and leg pain      Patient Active Problem List   Diagnosis    Type 1 diabetes mellitus with hyperglycemia (HCC)    Mixed hyperlipidemia    Thyroid nodule    Benign essential hypertension    Erectile dysfunction of non-organic origin    Hypoglycemia    Other specified congenital malformations of skin    Low back pain with sciatica    Somatic dysfunction of lumbar region    Strain of right psoas muscle    Voiding dysfunction    Lumbar radiculopathy       Past Medical History:   Diagnosis Date    Diabetes mellitus ([facility] [address] )     Insulin pump in place 7/18/2019    Vitamin D deficiency        Past Surgical History:   Procedure Laterality Date    NO PAST SURGERIES           Current Outpatient Medications:     aspirin 81 MG tablet, Take 1 tablet by mouth daily, Disp: , Rfl:     atorvastatin (LIPITOR) 80 mg tablet, Take 1 tablet (80 mg total) by mouth daily, Disp: 90 tablet, Rfl: 1    bimatoprost (LUMIGAN) 0 01 % ophthalmic drops, Apply 1 drop to eye Daily, Disp: , Rfl:     Blood Glucose Monitoring Suppl (ONE TOUCH ULTRA 2) w/Device KIT, by Does not apply route, Disp: , Rfl:     Cholecalciferol (VITAMIN D) 2000 units CAPS, Take by mouth, Disp: , Rfl:     Continuous Blood Gluc  (DEXCOM G6 ) SHARON, Use daily as directed for CGM, Disp: 1 Device, Rfl: 0    Continuous Blood Gluc Sensor (Dexcom G6 Sensor) MISC, Use daily as directed for CGM - Change every 10 days, Disp: 9 each, Rfl: 3    Continuous Blood Gluc Transmit (Dexcom G6 Transmitter) MISC, Use daily as directed for CGM - Change every 3 months, Disp: 1 each, Rfl: 3    gabapentin (NEURONTIN) 800 mg tablet, Take 1 tablet (800 mg total) by mouth 3 (three) times a day, Disp: 90 tablet, Rfl: 1    glucagon (GLUCAGON EMERGENCY) 1 MG injection, Inject as directed Twice daily, Disp: , Rfl:     glucose blood (ONE TOUCH ULTRA TEST) test strip, Test 6x daily, Disp: 600 each, Rfl: 3    insulin aspart (NovoLOG FlexPen) 100 UNIT/ML injection pen, Inject up to a maximum of 105 units based on sliding scale and carb coverage  , Disp: 90 mL, Rfl: 1    insulin glargine (Lantus SoloStar) 100 units/mL injection pen, Inject 60 units of Lantus in the morning  Inject 60 units of Lantus in the evening , Disp: 54 mL, Rfl: 1    Insulin Pen Needle (BD PEN NEEDLE NADYA U/F) 32G X 4 MM MISC, by Does not apply route daily, Disp: , Rfl:     Insulin Pen Needle (BD Pen Needle Nadya U/F) 32G X 4 MM MISC, Use 4 per day, Disp: 100 each, Rfl: 1    methylPREDNISolone 4 MG tablet therapy pack, Use as directed on package (Patient not taking: Reported on 3/22/2021), Disp: 1 each, Rfl: 0    Multiple Vitamins-Minerals (MULTIVITAMINS PLUS ZINC) CAPS, Take by mouth, Disp: , Rfl:     ONETOUCH DELICA LANCETS FINE MISC, by Does not apply route, Disp: , Rfl:     ramipril (ALTACE) 10 MG capsule, Take 1 capsule (10 mg total) by mouth daily, Disp: 90 capsule, Rfl: 0    Current Facility-Administered Medications:     dexamethasone (PF) (DECADRON) injection 20 mg, 20 mg, Epidural, Once, Clem Ann, DO    iohexol (OMNIPAQUE) 300 mg/mL injection 50 mL, 50 mL, Epidural, Once, Clem Ann, DO    lidocaine (PF) (XYLOCAINE-MPF) 2 % injection 2 mL, 2 mL, Epidural, Once, Gabby Parr, DO    No Known Allergies    Physical Exam:   Vitals:    06/08/21 0755   BP: 110/74   Pulse: 75   Resp: 18   Temp: 99 °F (37 2 °C)   SpO2: 95%     General: Awake, Alert, Oriented x 3, Mood and affect appropriate  Respiratory: Respirations even and unlabored  Cardiovascular: Peripheral pulses intact; no edema  Musculoskeletal Exam:   Right lumbar paraspinals tender to palpation  ASA Score: 3    Patient/Chart Verification  Patient ID Verified: Verbal  ID Band Applied: No  Consents Confirmed: Procedural, To be obtained in the Pre-Procedure area  H&P( within 30 days) Verified:  To be obtained in the Pre-Procedure area  Allergies Reviewed: Yes  Anticoag/NSAID held?: NA  Currently on antibiotics?: No    Assessment:   1   Lumbar radiculopathy        Plan: right L5 and S1 TFESI

## 2021-06-15 ENCOUNTER — TELEPHONE (OUTPATIENT)
Dept: PAIN MEDICINE | Facility: CLINIC | Age: 51
End: 2021-06-15

## 2021-06-17 NOTE — TELEPHONE ENCOUNTER
2nd attempt  Lm to cb with % improvement and pain level  Spoke with patient regarding lab results. She said she was going to talk with Arely Wood, regarding her options.                 ----- Message from CRISTI Ruvalcaba sent at 3/9/2021 12:43 PM CST -----  Please call with lab results. Liver function tests are within normal limits  Total cholesterol is normal at 195  Triglycerides normal at 47  HDL normal at 75  LDL  elevated 111  I would recommend increasing the pravastatin to 80 mg daily if she is agreeable.

## 2021-06-23 DIAGNOSIS — E78.2 MIXED HYPERLIPIDEMIA: ICD-10-CM

## 2021-06-23 DIAGNOSIS — I10 HYPERTENSION, UNSPECIFIED TYPE: ICD-10-CM

## 2021-06-23 RX ORDER — RAMIPRIL 10 MG/1
10 CAPSULE ORAL DAILY
Qty: 90 CAPSULE | Refills: 0 | Status: SHIPPED | OUTPATIENT
Start: 2021-06-23 | End: 2021-09-15 | Stop reason: SDUPTHER

## 2021-06-23 RX ORDER — ATORVASTATIN CALCIUM 80 MG/1
80 TABLET, FILM COATED ORAL DAILY
Qty: 90 TABLET | Refills: 0 | Status: SHIPPED | OUTPATIENT
Start: 2021-06-23 | End: 2021-09-15 | Stop reason: SDUPTHER

## 2021-07-06 ENCOUNTER — TELEPHONE (OUTPATIENT)
Dept: PAIN MEDICINE | Facility: CLINIC | Age: 51
End: 2021-07-06

## 2021-07-06 NOTE — TELEPHONE ENCOUNTER
lmom for patient making him aware of no show policy since he was a no show for today's appt  He was has a no show form 8/3/2020    Advised patient to call back to r/s if needed

## 2021-07-18 DIAGNOSIS — E10.65 TYPE 1 DIABETES MELLITUS WITH HYPERGLYCEMIA (HCC): ICD-10-CM

## 2021-07-20 RX ORDER — PEN NEEDLE, DIABETIC 32GX 5/32"
NEEDLE, DISPOSABLE MISCELLANEOUS
Qty: 100 EACH | Refills: 0 | Status: SHIPPED | OUTPATIENT
Start: 2021-07-20 | End: 2021-12-08 | Stop reason: SDUPTHER

## 2021-08-25 DIAGNOSIS — E10.65 TYPE 1 DIABETES MELLITUS WITH HYPERGLYCEMIA (HCC): ICD-10-CM

## 2021-08-26 RX ORDER — BLOOD-GLUCOSE TRANSMITTER
EACH MISCELLANEOUS
Qty: 1 EACH | Refills: 3 | Status: SHIPPED | OUTPATIENT
Start: 2021-08-26 | End: 2022-07-12 | Stop reason: SDUPTHER

## 2021-09-15 DIAGNOSIS — I10 HYPERTENSION, UNSPECIFIED TYPE: ICD-10-CM

## 2021-09-15 DIAGNOSIS — E10.65 TYPE 1 DIABETES MELLITUS WITH HYPERGLYCEMIA (HCC): ICD-10-CM

## 2021-09-15 DIAGNOSIS — E78.2 MIXED HYPERLIPIDEMIA: ICD-10-CM

## 2021-09-15 RX ORDER — ATORVASTATIN CALCIUM 80 MG/1
80 TABLET, FILM COATED ORAL DAILY
Qty: 90 TABLET | Refills: 0 | Status: SHIPPED | OUTPATIENT
Start: 2021-09-15 | End: 2021-12-16 | Stop reason: SDUPTHER

## 2021-09-15 RX ORDER — RAMIPRIL 10 MG/1
10 CAPSULE ORAL DAILY
Qty: 90 CAPSULE | Refills: 0 | Status: SHIPPED | OUTPATIENT
Start: 2021-09-15 | End: 2021-12-16 | Stop reason: SDUPTHER

## 2021-09-15 RX ORDER — INSULIN GLARGINE 100 [IU]/ML
INJECTION, SOLUTION SUBCUTANEOUS
Qty: 54 ML | Refills: 0 | Status: SHIPPED | OUTPATIENT
Start: 2021-09-15 | End: 2021-11-27 | Stop reason: SDUPTHER

## 2021-09-18 ENCOUNTER — OFFICE VISIT (OUTPATIENT)
Dept: URGENT CARE | Age: 51
End: 2021-09-18
Payer: COMMERCIAL

## 2021-09-18 VITALS
WEIGHT: 290 LBS | HEART RATE: 77 BPM | SYSTOLIC BLOOD PRESSURE: 149 MMHG | HEIGHT: 71 IN | OXYGEN SATURATION: 97 % | DIASTOLIC BLOOD PRESSURE: 85 MMHG | TEMPERATURE: 95.3 F | BODY MASS INDEX: 40.6 KG/M2

## 2021-09-18 DIAGNOSIS — E11.628 BULLOSIS DIABETICORUM (HCC): Primary | ICD-10-CM

## 2021-09-18 PROCEDURE — G0382 LEV 3 HOSP TYPE B ED VISIT: HCPCS | Performed by: PHYSICIAN ASSISTANT

## 2021-09-18 NOTE — PATIENT INSTRUCTIONS
Keep wound clean and dry  Do not puncture the blister  If symptoms worsen, including redness surrounding site and or development of fever report to the ED  Contact your PCP first thing Monday morning and have follow up as soon as possible        Blister   WHAT YOU NEED TO KNOW:   A blister is a fluid-filled pocket on the surface of your skin  The fluid may be serum, blood, or other fluid, depending on what caused the blister  A layer of fluid is created to protect the skin until it heals  Blisters usually heal on their own within 2 weeks  DISCHARGE INSTRUCTIONS:   Return to the emergency department if:   · You see signs of infection, such as red streaks, pus, or swelling  Call your doctor or dermatologist if:   · You have increased pain, redness, and swelling in the blister area  · You notice a bad-smelling fluid coming from your blister  · The blister does not heal within 2 weeks, or when your healthcare provider recommended  · You have a fever, chills, or body aches  · You have questions or concerns about your condition or care  Care for your blister:  Do not pop your blister or tear the skin on it  This could cause infection and slow the healing process  The following will help protect your blister area:  · Wash your hands before you care for your blister  This will help prevent infection  Use soap and water, or a gel-based hand  if soap and water are not available  · Cover your blister with a bandage, if needed  A bandage can help prevent the blister from being torn or popped  If the blister does break open, a bandage can will keep the area clean prevent infection  ? Use a bandage that is large enough to cover the entire blister  This will prevent the bandage from sticking to the blister  Your healthcare provider may tell you to use certain moist bandages or hydrogels  ? You may need a bandage that absorbs well if your blister has a lot of fluid   You may be told to wear a second bandage for extra protection  ? Carefully remove your bandage to care for the area  If the bandage sticks to your blister, pour saline on it  This will help the bandage come off more easily and prevent more skin damage  ? Apply clean bandages as directed  Change your bandages when they get wet, dirty, or soaked with fluid  · Keep the blister area clean and dry  Wash the area with soap or saline and water  Gently pat the area dry  Look for signs of infection, such as redness, swelling, or pus  Prevent another blister:   · Apply a thick skin cream or ointment  This can help prevent friction  Your healthcare provider may recommend a certain product  · Choose clothes that do not bind, rub, or irritate your skin  Some fabrics can prevent blisters by wicking moisture away from your skin  Choose fabric that allows air to flow around your skin  Light clothing that fits loosely prevents friction as you move  Wear work gloves when you use tools such as a rake or a hammer  · Protect your feet  Keep your feet clean and dry during the day  Wear shoes that fit well  Shoes that rub your feet may cause or worsen blisters  Cushioned insoles, padding, or moleskin can keep your feet from rubbing in your shoes  You can also wear 2 pairs of socks to give your feet more protection  · Use padding to protect pressure areas, such as your heels  Gauze, moleskin, or similar padding can prevent or relieve pressure  You can also use padding to protect an area that has a blister  Make a hole in the middle of the padding  Place the padding so the blister goes through the hole  Cover the entire area with a bandage  · Stop your activity if a red area forms  Red or painful skin during activity may be a sign that a blister could form  You may need to stop and add padding or a bandage to the area  You may need to change clothing or your shoes      Follow up with your doctor or dermatologist as directed: You may need to return to have your blister drained if it is large  Write down your questions so you remember to ask them during your visits  © Copyright [employer] 2021 Information is for End User's use only and may not be sold, redistributed or otherwise used for commercial purposes  All illustrations and images included in CareNotes® are the copyrighted property of A D A M , Inc  or [employer]  The above information is an  only  It is not intended as medical advice for individual conditions or treatments  Talk to your doctor, nurse or pharmacist before following any medical regimen to see if it is safe and effective for you

## 2021-09-18 NOTE — PROGRESS NOTES
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[address] )           Patient Instructions       Follow up with PCP in 3-5 days  Proceed to  ER if symptoms worsen  Chief Complaint     Chief Complaint   Patient presents with    Blister     on right foot started monday-its all around the 2 nd toe from big one-full of fluid         History of Present Illness       Patient is c/o blister to second toe of right foot  Patient reports noticing this blister for the past week  Patient denies any fever or drainage  Pt denies any known injury  Pt is diabetic  Review of Systems   Review of Systems   Constitutional: Negative for chills and fever  HENT: Negative for ear pain and sore throat  Eyes: Negative for pain and visual disturbance  Respiratory: Negative for cough and shortness of breath  Cardiovascular: Negative for chest pain and palpitations  Gastrointestinal: Negative for abdominal pain and vomiting  Genitourinary: Negative for dysuria and hematuria  Musculoskeletal: Negative for arthralgias and back pain  Skin: Positive for rash (Blister right second toe)  Negative for color change  Neurological: Negative for seizures and syncope  All other systems reviewed and are negative          Current Medications       Current Outpatient Medications:     aspirin 81 MG tablet, Take 1 tablet by mouth daily, Disp: , Rfl:     atorvastatin (LIPITOR) 80 mg tablet, Take 1 tablet (80 mg total) by mouth daily, Disp: 90 tablet, Rfl: 0    bimatoprost (LUMIGAN) 0 01 % ophthalmic drops, Apply 1 drop to eye Daily, Disp: , Rfl:     Blood Glucose Monitoring Suppl (ONE TOUCH ULTRA 2) w/Device KIT, by Does not apply route, Disp: , Rfl:     Cholecalciferol (VITAMIN D) 2000 units CAPS, Take by mouth, Disp: , Rfl:     Continuous Blood Gluc  ([address]) SHARON, Use daily as directed for CGM, Disp: 1 Device, Rfl: 0    Continuous Blood Gluc Sensor (Dexcom G6 Sensor) MISC, Use daily as directed for CGM - Change every 10 days, Disp: 9 each, Rfl: 3    Continuous Blood Gluc Transmit (Dexcom G6 Transmitter) MISC, Use daily as directed for CGM - Change every 3 months, Disp: 1 each, Rfl: 3    glucagon (GLUCAGON EMERGENCY) 1 MG injection, Inject as directed Twice daily, Disp: , Rfl:     glucose blood (ONE TOUCH ULTRA TEST) test strip, Test 6x daily, Disp: 600 each, Rfl: 3    insulin aspart (NovoLOG FlexPen) 100 UNIT/ML injection pen, Inject up to a maximum of 105 units based on sliding scale and carb coverage  , Disp: 90 mL, Rfl: 1    insulin glargine (Lantus SoloStar) 100 units/mL injection pen, Inject 60 units of Lantus in the morning   Inject 60 units of Lantus in the evening , Disp: 54 mL, Rfl: 0    Insulin Pen Needle (BD Pen Needle Ashli U/F) 32G X 4 MM MISC, Use 4 per day, Disp: 100 each, Rfl: 0    Multiple Vitamins-Minerals (MULTIVITAMINS PLUS ZINC) CAPS, Take by mouth, Disp: , Rfl:     ONETOUCH DELICA LANCETS FINE MISC, by Does not apply route, Disp: , Rfl:     ramipril (ALTACE) 10 MG capsule, Take 1 capsule (10 mg total) by mouth daily, Disp: 90 capsule, Rfl: 0    gabapentin (NEURONTIN) 800 mg tablet, Take 1 tablet (800 mg total) by mouth 3 (three) times a day (Patient not taking: Reported on 9/18/2021), Disp: 90 tablet, Rfl: 1    Insulin Pen Needle (BD PEN NEEDLE ASHLI U/F) 32G X 4 MM MISC, by Does not apply route daily, Disp: , Rfl:     methylPREDNISolone 4 MG tablet therapy pack, Use as directed on package (Patient not taking: Reported on 3/22/2021), Disp: 1 each, Rfl: 0    Current Allergies     Allergies as of 09/18/2021    (No Known Allergies)            The following portions of the patient's history were reviewed and updated as appropriate: allergies, current medications, past family history, past medical history, past social history, past surgical history and problem list      Past Medical History:   Diagnosis Date    Diabetes mellitus ([address] )     Insulin pump in place 7/18/2019    Vitamin D deficiency        Past Surgical History:   Procedure Laterality Date    NO PAST SURGERIES         Family History   Problem Relation Age of Onset    Lung cancer Mother     Cancer Maternal Grandfather     Diabetes Maternal Grandfather     Diabetes Family     Other Family         cardiac disorder    Hypertension Family          Medications have been verified  Objective   /85   Pulse 77   Temp (!) 95 3 °F (35 2 °C)   Ht 5' 11" (1 803 m)   Wt 132 kg (290 lb)   SpO2 97%   BMI 40 45 kg/m²   No LMP for male patient  Physical Exam     Physical Exam  Constitutional:       Appearance: Normal appearance  He is normal weight  HENT:      Head: Normocephalic and atraumatic  Nose: Nose normal       Mouth/Throat:      Mouth: Mucous membranes are moist    Eyes:      Extraocular Movements: Extraocular movements intact  Conjunctiva/sclera: Conjunctivae normal       Pupils: Pupils are equal, round, and reactive to light  Cardiovascular:      Rate and Rhythm: Normal rate  Pulmonary:      Effort: Pulmonary effort is normal    Musculoskeletal:         General: Normal range of motion  Cervical back: Normal range of motion and neck supple  Feet:    Skin:     General: Skin is warm and dry  Neurological:      General: No focal deficit present  Mental Status: He is alert and oriented to person, place, and time     Psychiatric:         Mood and Affect: Mood normal          Behavior: Behavior normal

## 2021-11-15 DIAGNOSIS — E10.65 TYPE 1 DIABETES MELLITUS WITH HYPERGLYCEMIA (HCC): ICD-10-CM

## 2021-11-15 RX ORDER — BLOOD-GLUCOSE SENSOR
EACH MISCELLANEOUS
Qty: 9 EACH | Refills: 0 | Status: SHIPPED | OUTPATIENT
Start: 2021-11-15 | End: 2022-02-04 | Stop reason: SDUPTHER

## 2021-11-27 DIAGNOSIS — E10.65 TYPE 1 DIABETES MELLITUS WITH HYPERGLYCEMIA (HCC): ICD-10-CM

## 2021-11-27 RX ORDER — BLOOD-GLUCOSE TRANSMITTER
EACH MISCELLANEOUS
Qty: 1 EACH | Refills: 0 | Status: CANCELLED | OUTPATIENT
Start: 2021-11-27

## 2021-11-29 RX ORDER — INSULIN GLARGINE 100 [IU]/ML
INJECTION, SOLUTION SUBCUTANEOUS
Qty: 54 ML | Refills: 0 | Status: SHIPPED | OUTPATIENT
Start: 2021-11-29 | End: 2022-01-13 | Stop reason: CLARIF

## 2021-11-29 RX ORDER — INSULIN ASPART 100 [IU]/ML
INJECTION, SOLUTION INTRAVENOUS; SUBCUTANEOUS
Qty: 90 ML | Refills: 0 | Status: SHIPPED | OUTPATIENT
Start: 2021-11-29 | End: 2022-03-10 | Stop reason: SDUPTHER

## 2021-11-30 ENCOUNTER — IMMUNIZATIONS (OUTPATIENT)
Dept: FAMILY MEDICINE CLINIC | Facility: HOSPITAL | Age: 51
End: 2021-11-30

## 2021-11-30 DIAGNOSIS — Z23 ENCOUNTER FOR IMMUNIZATION: Primary | ICD-10-CM

## 2021-11-30 PROCEDURE — 0064A COVID-19 MODERNA VACC 0.25 ML BOOSTER: CPT

## 2021-11-30 PROCEDURE — 91306 COVID-19 MODERNA VACC 0.25 ML BOOSTER: CPT

## 2021-12-16 DIAGNOSIS — E78.2 MIXED HYPERLIPIDEMIA: ICD-10-CM

## 2021-12-16 DIAGNOSIS — I10 HYPERTENSION, UNSPECIFIED TYPE: ICD-10-CM

## 2021-12-17 RX ORDER — RAMIPRIL 10 MG/1
10 CAPSULE ORAL DAILY
Qty: 90 CAPSULE | Refills: 0 | Status: SHIPPED | OUTPATIENT
Start: 2021-12-17 | End: 2022-03-10 | Stop reason: SDUPTHER

## 2021-12-17 RX ORDER — ATORVASTATIN CALCIUM 80 MG/1
80 TABLET, FILM COATED ORAL DAILY
Qty: 90 TABLET | Refills: 0 | Status: SHIPPED | OUTPATIENT
Start: 2021-12-17 | End: 2022-03-10 | Stop reason: SDUPTHER

## 2021-12-29 ENCOUNTER — OFFICE VISIT (OUTPATIENT)
Dept: ENDOCRINOLOGY | Facility: CLINIC | Age: 51
End: 2021-12-29
Payer: COMMERCIAL

## 2021-12-29 VITALS
BODY MASS INDEX: 41.44 KG/M2 | HEIGHT: 71 IN | DIASTOLIC BLOOD PRESSURE: 76 MMHG | WEIGHT: 296 LBS | TEMPERATURE: 99.1 F | RESPIRATION RATE: 18 BRPM | HEART RATE: 88 BPM | SYSTOLIC BLOOD PRESSURE: 152 MMHG

## 2021-12-29 DIAGNOSIS — E10.65 TYPE 1 DIABETES MELLITUS WITH HYPERGLYCEMIA (HCC): Primary | ICD-10-CM

## 2021-12-29 DIAGNOSIS — I10 BENIGN ESSENTIAL HYPERTENSION: ICD-10-CM

## 2021-12-29 DIAGNOSIS — E04.1 THYROID NODULE: ICD-10-CM

## 2021-12-29 DIAGNOSIS — E78.2 MIXED HYPERLIPIDEMIA: ICD-10-CM

## 2021-12-29 DIAGNOSIS — H40.9 GLAUCOMA OF BOTH EYES, UNSPECIFIED GLAUCOMA TYPE: ICD-10-CM

## 2021-12-29 LAB — SL AMB POCT HEMOGLOBIN AIC: 7 (ref ?–6.5)

## 2021-12-29 PROCEDURE — 99214 OFFICE O/P EST MOD 30 MIN: CPT | Performed by: NURSE PRACTITIONER

## 2021-12-29 PROCEDURE — 83036 HEMOGLOBIN GLYCOSYLATED A1C: CPT | Performed by: NURSE PRACTITIONER

## 2021-12-29 RX ORDER — BIMATOPROST 0.01 %
1 DROPS OPHTHALMIC (EYE)
Qty: 5 ML | Refills: 0 | Status: SHIPPED | OUTPATIENT
Start: 2021-12-29

## 2021-12-30 ENCOUNTER — TELEPHONE (OUTPATIENT)
Dept: ENDOCRINOLOGY | Facility: CLINIC | Age: 51
End: 2021-12-30

## 2021-12-31 ENCOUNTER — APPOINTMENT (OUTPATIENT)
Dept: LAB | Age: 51
End: 2021-12-31
Payer: COMMERCIAL

## 2021-12-31 DIAGNOSIS — E10.65 TYPE 1 DIABETES MELLITUS WITH HYPERGLYCEMIA (HCC): ICD-10-CM

## 2021-12-31 LAB
ALBUMIN SERPL BCP-MCNC: 4.1 G/DL (ref 3.5–5)
ALP SERPL-CCNC: 86 U/L (ref 46–116)
ALT SERPL W P-5'-P-CCNC: 66 U/L (ref 12–78)
ANION GAP SERPL CALCULATED.3IONS-SCNC: 2 MMOL/L (ref 4–13)
AST SERPL W P-5'-P-CCNC: 32 U/L (ref 5–45)
BILIRUB SERPL-MCNC: 0.97 MG/DL (ref 0.2–1)
BUN SERPL-MCNC: 21 MG/DL (ref 5–25)
CALCIUM SERPL-MCNC: 9.6 MG/DL (ref 8.3–10.1)
CHLORIDE SERPL-SCNC: 104 MMOL/L (ref 100–108)
CHOLEST SERPL-MCNC: 118 MG/DL
CO2 SERPL-SCNC: 32 MMOL/L (ref 21–32)
CREAT SERPL-MCNC: 0.96 MG/DL (ref 0.6–1.3)
CREAT UR-MCNC: 253 MG/DL
GFR SERPL CREATININE-BSD FRML MDRD: 92 ML/MIN/1.73SQ M
GLUCOSE P FAST SERPL-MCNC: 113 MG/DL (ref 65–99)
HDLC SERPL-MCNC: 34 MG/DL
LDLC SERPL CALC-MCNC: 69 MG/DL (ref 0–100)
MICROALBUMIN UR-MCNC: 17.6 MG/L (ref 0–20)
MICROALBUMIN/CREAT 24H UR: 7 MG/G CREATININE (ref 0–30)
NONHDLC SERPL-MCNC: 84 MG/DL
POTASSIUM SERPL-SCNC: 4.4 MMOL/L (ref 3.5–5.3)
PROT SERPL-MCNC: 7.5 G/DL (ref 6.4–8.2)
SODIUM SERPL-SCNC: 138 MMOL/L (ref 136–145)
TRIGL SERPL-MCNC: 74 MG/DL

## 2021-12-31 PROCEDURE — 80053 COMPREHEN METABOLIC PANEL: CPT

## 2021-12-31 PROCEDURE — 82570 ASSAY OF URINE CREATININE: CPT

## 2021-12-31 PROCEDURE — 82043 UR ALBUMIN QUANTITATIVE: CPT

## 2021-12-31 PROCEDURE — 36415 COLL VENOUS BLD VENIPUNCTURE: CPT

## 2021-12-31 PROCEDURE — 80061 LIPID PANEL: CPT

## 2022-01-03 ENCOUNTER — TELEPHONE (OUTPATIENT)
Dept: PAIN MEDICINE | Facility: CLINIC | Age: 52
End: 2022-01-03

## 2022-01-04 DIAGNOSIS — M54.16 LUMBAR RADICULOPATHY: Primary | ICD-10-CM

## 2022-01-04 NOTE — TELEPHONE ENCOUNTER
Patient called back to schedule  He is going to check with wife for a  and also call back with new insurance information

## 2022-01-04 NOTE — TELEPHONE ENCOUNTER
Patient called back and was scheduled for repeat right L5 and S1 TFESI  All pre procedure instructions were given to patient   Nothing to eat or drink for 1 hour prior  Loose fitting clothing   Denies Anti Coag's   NSAIDS okay, ASA okay  Denies Antibx   Needs    Patient instructed to continue all routine medications   Patient instructed to contact our office if becomes sick   Refrain from any vaccines 2 weeks before & 2 weeks after  Insurance auth received but is not a guarantee of payment per your insurance company's authorization disclaimer and it is your responsibility to verify your benefits   COVID -19 screening complete

## 2022-01-08 ENCOUNTER — PATIENT MESSAGE (OUTPATIENT)
Dept: ENDOCRINOLOGY | Facility: CLINIC | Age: 52
End: 2022-01-08

## 2022-01-08 DIAGNOSIS — E10.65 TYPE 1 DIABETES MELLITUS WITH HYPERGLYCEMIA (HCC): Primary | ICD-10-CM

## 2022-01-12 ENCOUNTER — HOSPITAL ENCOUNTER (OUTPATIENT)
Dept: RADIOLOGY | Facility: CLINIC | Age: 52
Discharge: HOME | End: 2022-01-12
Attending: ANESTHESIOLOGY | Admitting: ANESTHESIOLOGY
Payer: COMMERCIAL

## 2022-01-12 VITALS
OXYGEN SATURATION: 96 % | TEMPERATURE: 98.1 F | RESPIRATION RATE: 20 BRPM | SYSTOLIC BLOOD PRESSURE: 135 MMHG | HEART RATE: 83 BPM | DIASTOLIC BLOOD PRESSURE: 75 MMHG

## 2022-01-12 DIAGNOSIS — M54.16 LUMBAR RADICULOPATHY: ICD-10-CM

## 2022-01-12 PROCEDURE — 64484 NJX AA&/STRD TFRM EPI L/S EA: CPT | Performed by: ANESTHESIOLOGY

## 2022-01-12 PROCEDURE — 64483 NJX AA&/STRD TFRM EPI L/S 1: CPT | Performed by: ANESTHESIOLOGY

## 2022-01-12 RX ORDER — PAPAVERINE HCL 150 MG
20 CAPSULE, EXTENDED RELEASE ORAL ONCE
Status: COMPLETED | OUTPATIENT
Start: 2022-01-12 | End: 2022-01-12

## 2022-01-12 RX ADMIN — LIDOCAINE HYDROCHLORIDE 2 ML: 20 INJECTION, SOLUTION EPIDURAL; INFILTRATION; INTRACAUDAL; PERINEURAL at 08:52

## 2022-01-12 RX ADMIN — IOHEXOL 2 ML: 300 INJECTION, SOLUTION INTRAVENOUS at 08:51

## 2022-01-12 RX ADMIN — DEXAMETHASONE SODIUM PHOSPHATE 15 MG: 10 INJECTION, SOLUTION INTRAMUSCULAR; INTRAVENOUS at 08:51

## 2022-01-12 NOTE — H&P
History of Present Illness: The patient is a 54 y o  male who presents with complaints of low back and leg pain  Patient Active Problem List   Diagnosis    Type 1 diabetes mellitus with hyperglycemia (HCC)    Mixed hyperlipidemia    Thyroid nodule    Benign essential hypertension    Erectile dysfunction of non-organic origin    Hypoglycemia    Other specified congenital malformations of skin    Low back pain with sciatica    Somatic dysfunction of lumbar region    Strain of right psoas muscle    Voiding dysfunction    Lumbar radiculopathy       Past Medical History:   Diagnosis Date    Diabetes mellitus ([facility] [address] )     Insulin pump in place 7/18/2019    Vitamin D deficiency        Past Surgical History:   Procedure Laterality Date    NO PAST SURGERIES           Current Outpatient Medications:     aspirin 81 MG tablet, Take 1 tablet by mouth daily, Disp: , Rfl:     atorvastatin (LIPITOR) 80 mg tablet, Take 1 tablet (80 mg total) by mouth daily, Disp: 90 tablet, Rfl: 0    bimatoprost (Lumigan) 0 01 % ophthalmic drops, Administer 1 drop to both eyes daily at bedtime, Disp: 5 mL, Rfl: 0    Cholecalciferol (VITAMIN D) 2000 units CAPS, Take by mouth, Disp: , Rfl:     Continuous Blood Gluc  (DEXCOM G6 ) SHARON, Use daily as directed for CGM, Disp: 1 Device, Rfl: 0    Continuous Blood Gluc Sensor (Dexcom G6 Sensor) MISC, Use daily as directed for CGM - Change every 10 days, Disp: 9 each, Rfl: 0    Continuous Blood Gluc Transmit (Dexcom G6 Transmitter) MISC, Use daily as directed for CGM - Change every 3 months, Disp: 1 each, Rfl: 3    glucagon (GLUCAGON EMERGENCY) 1 MG injection, Inject as directed Twice daily, Disp: , Rfl:     glucose blood (ONE TOUCH ULTRA TEST) test strip, Test 6x daily, Disp: 600 each, Rfl: 3    insulin aspart (NovoLOG FlexPen) 100 UNIT/ML injection pen, Inject up to a maximum of 105 units based on sliding scale and carb coverage  , Disp: 90 mL, Rfl: 0    insulin glargine (Lantus SoloStar) 100 units/mL injection pen, Inject 60 units of Lantus in the morning  Inject 60 units of Lantus in the evening , Disp: 54 mL, Rfl: 0    Insulin Pen Needle (BD Pen Needle Ashli U/F) 32G X 4 MM MISC, Use 4 per day, Disp: 100 each, Rfl: 3    Multiple Vitamins-Minerals (MULTIVITAMINS PLUS ZINC) CAPS, Take by mouth, Disp: , Rfl:     ONETOUCH DELICA LANCETS FINE MISC, by Does not apply route, Disp: , Rfl:     ramipril (ALTACE) 10 MG capsule, Take 1 capsule (10 mg total) by mouth daily, Disp: 90 capsule, Rfl: 0    No Known Allergies    Physical Exam:   Vitals:    01/12/22 0828   BP: 135/75   Pulse: 83   Resp: 20   Temp: 98 1 °F (36 7 °C)   SpO2: 96%     General: Awake, Alert, Oriented x 3, Mood and affect appropriate  Respiratory: Respirations even and unlabored  Cardiovascular: Peripheral pulses intact; no edema  Musculoskeletal Exam:  Right lumbar paraspinals tender to palpation  ASA Score: 3    Patient/Chart Verification  Patient ID Verified: Verbal  ID Band Applied: No  Consents Confirmed: Procedural,To be obtained in the Pre-Procedure area  H&P( within 30 days) Verified: To be obtained in the Pre-Procedure area  Allergies Reviewed: Yes  Anticoag/NSAID held?: NA  Currently on antibiotics?: No    Assessment:   1   Lumbar radiculopathy        Plan: repeat right L5 and S1 TFESI

## 2022-01-13 RX ORDER — INSULIN GLARGINE-YFGN 100 [IU]/ML
INJECTION, SOLUTION SUBCUTANEOUS
Qty: 10 ML | Refills: 2 | Status: SHIPPED | OUTPATIENT
Start: 2022-01-13 | End: 2022-01-25

## 2022-01-14 DIAGNOSIS — E10.65 TYPE 1 DIABETES MELLITUS WITH HYPERGLYCEMIA (HCC): ICD-10-CM

## 2022-01-14 RX ORDER — INSULIN GLARGINE-YFGN 100 [IU]/ML
INJECTION, SOLUTION SUBCUTANEOUS
Qty: 10 ML | Refills: 0 | Status: CANCELLED | OUTPATIENT
Start: 2022-01-14

## 2022-01-19 ENCOUNTER — TELEPHONE (OUTPATIENT)
Dept: PAIN MEDICINE | Facility: CLINIC | Age: 52
End: 2022-01-19

## 2022-01-19 ENCOUNTER — PATIENT MESSAGE (OUTPATIENT)
Dept: ENDOCRINOLOGY | Facility: CLINIC | Age: 52
End: 2022-01-19

## 2022-01-20 ENCOUNTER — TELEPHONE (OUTPATIENT)
Dept: SLEEP CENTER | Facility: CLINIC | Age: 52
End: 2022-01-20

## 2022-01-20 NOTE — TELEPHONE ENCOUNTER
Patient left message stating he is eligible for a replacement CPAP  Would like to know how to get order for one  Per chart, patient has never been seen in the sleep center  Spoke to patient who states he is a patient of Dr Twin Song  He has never seen him in his private practice in [facility]  States he has had sleep studies at [facility] Advised no records found and no office visits in our system  Patient scheduled for consult with Dr Murcia Many 5/25/22

## 2022-01-21 ENCOUNTER — TELEPHONE (OUTPATIENT)
Dept: ENDOCRINOLOGY | Facility: CLINIC | Age: 52
End: 2022-01-21

## 2022-01-21 DIAGNOSIS — E10.65 TYPE 1 DIABETES MELLITUS WITH HYPERGLYCEMIA (HCC): Primary | ICD-10-CM

## 2022-01-21 NOTE — TELEPHONE ENCOUNTER
Patient picked up his semglee but it was only a 8 day supply as the rx read disp qty 10ml, spoke to pharmicist if we resend a new script it will be ok with insurance  He also needs insulin needles   Thank you

## 2022-01-25 RX ORDER — INSULIN GLARGINE-YFGN 100 [IU]/ML
INJECTION, SOLUTION SUBCUTANEOUS
Qty: 90 ML | Refills: 1 | Status: SHIPPED | OUTPATIENT
Start: 2022-01-25 | End: 2022-05-18 | Stop reason: SDUPTHER

## 2022-01-25 NOTE — TELEPHONE ENCOUNTER
New prescription sent  I changed the script to prefilled syringes as patient uses flex pen for novolog

## 2022-01-25 NOTE — TELEPHONE ENCOUNTER
YEISON for patient to call the office so we can let him know a new rx was sent for his semglee and that it is now the prefilled syringes

## 2022-01-26 ENCOUNTER — AMB VIDEO VISIT (OUTPATIENT)
Dept: OTHER | Facility: HOSPITAL | Age: 52
End: 2022-01-26

## 2022-01-26 ENCOUNTER — AMB VIDEO VISIT (OUTPATIENT)
Dept: OTHER | Facility: HOSPITAL | Age: 52
End: 2022-01-26
Payer: COMMERCIAL

## 2022-01-26 DIAGNOSIS — Z20.822 SUSPECTED 2019 NOVEL CORONAVIRUS INFECTION: Primary | ICD-10-CM

## 2022-01-26 PROCEDURE — 87636 SARSCOV2 & INF A&B AMP PRB: CPT | Performed by: PHYSICIAN ASSISTANT

## 2022-01-26 PROCEDURE — 99212 OFFICE O/P EST SF 10 MIN: CPT | Performed by: PHYSICIAN ASSISTANT

## 2022-01-26 NOTE — CARE ANYWHERE EVISITS
Visit Summary for TRISTIN LERMA - Gender: Male - Date of Birth: [id number]  Date: [id number] - Duration: 18 minutes  Patient: Kota LERMA  Provider: Deangelo Unger PA-C    Patient Contact Information  Address  [address]; [address]  [phone]    Visit Topics    Triage Questions   What is your current physical address in the event of a medical emergency? Answer []  Are you allergic to any medications? Answer []  Are you now or could you be pregnant? Answer []  Do you have any immune system compromise or chronic lung   disease? Answer []  Do you have any vulnerable family members in the home (infant, pregnant, cancer, elderly)? Answer []     Conversation Transcripts  [0A][0A] [Notification] You are connected with Deangelo Unger PA-C, Urgent Care Specialist [0A][Notification] TRISTIN LERMA is located in [place]  [0A][Notification] TRISTIN LERMA has shared health history  Manny Joselander  [0A]    Diagnosis  Contact with and (suspected) exposure to COVID-19    Procedures  Value: [id number] Code: CPT-4 UNLISTED E&M SERVICE    Medications Prescribed    No prescriptions ordered    Electronically signed by: Carla Estevez(NPI [id number])

## 2022-01-26 NOTE — LETTER
[facility] VISIT VIR  [address] [address]    January 26, 2022     Patient: Shahzad Salgado   YOB: 1970   Date of Visit: 1/26/2022       To Whom it May Concern:    Maria Isabel Kirkland was seen and evaluated virtually on 1/26/2022 and is being tested for COVID/Flu  Please note if COVID and Flu tests are negative, He may return to school/work when fever free for 24 hours without the use of a fever reducing agent  If COVID or Flu test is positive, He may return to work on 1/31/22 if fever free for 24 hours, as this is 5 days from the onset of symptoms  Upon return, He must then adhere to strict masking for an additional 5 days  If you have any questions or concerns, please don't hesitate to call           Sincerely,          José Antonio Chand PA-C        CC: No Recipients

## 2022-01-26 NOTE — PROGRESS NOTES
Video Visit - Vivek Garcia 54 y o  male MRN: [id number]    REQUIRED DOCUMENTATION:         1  This service was provided via Amse[employer]mi  2  Provider located at [address] [place] [id number]  3  [facility] provider: Maynor Chapa PA-C   4  Identify all parties in room with patient during [facility] visit:  No one else  5  After connecting through LYSOGENE, patient was identified by name and date of birth  Patient was then informed that this was a Telemedicine visit and that the exam was being conducted confidentially over secure lines  My office door was closed  No one else was in the room  Patient acknowledged consent and understanding of privacy and security of the Telemedicine visit  I informed the patient that I have reviewed their record in Epic and presented the opportunity for them to ask any questions regarding the visit today  The patient agreed to participate  VITALS: Heart Rate: 88 BPM, Respiratory Rate: 18 RPM, Temperature Unavailable° F, Blood Pressure Unavailable mmHg, Pulse Ox Unavailable % on RA    HPI  Pt reports Monday woke up feeling unwell, yesterday a little worse  Today called out sick  Reports HA, cough, irritated throat from cough  Taking ibuprofen without relief  Fully vaccinated  Works in a school, 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  Physical Exam  Constitutional:       General: He is not in acute distress  Appearance: Normal appearance  He is obese  He is not toxic-appearing  HENT:      Head: Normocephalic and atraumatic  Nose: No rhinorrhea  Mouth/Throat:      Mouth: Mucous membranes are moist       Comments: No significant erythema, although not very well visualized  Eyes:      Conjunctiva/sclera: Conjunctivae normal    Pulmonary:      Effort: Pulmonary effort is normal  No respiratory distress  Breath sounds: No wheezing (no gross audible wheeze through computer)  Musculoskeletal:      Cervical back: Normal range of motion  Skin:     Findings: No rash (on face or neck)  Neurological:      Mental Status: He is alert  Cranial Nerves: No dysarthria or facial asymmetry  Psychiatric:         Mood and Affect: Mood normal          Behavior: Behavior normal          Diagnoses and all orders for this visit:    Suspected 2019 novel coronavirus infection  -     Covid/Flu- Collected at   [facility] or [facility]      Patient Instructions   Testing Site Location Information    The most up-to-date information can be found at this link:  [email or web address]    [employer] (side of building), [address], [address]   · Monday - Friday: 3pm-6pm  · Drive-up and walk-up available    [address] (behind the [facility]), [address], [facility] [address]  · Monday - Thursday: 8am-12pm, 5pm-8pm  · Friday: 8am-12pm  · Saturday: 9am-12pm     [facility], [address] [facility], [address] [place] [address]  · Monday - Thursday: 3pm-7pm  · Saturday: 9am-1pm     [address], [facility] [address]  · Monday - Friday: 3pm-7pm    [address] [facility], [address]  [address]  · Monday - Thursday: 8am-12pm, 5pm-8pm  · Friday: 8am-12pm  · Saturday: 9am-12pm    Please Be Courteous:  You are being tested for novel coronavirus (COVID-19) your test is pending at this time  You need to self quarantine at least until you have the results back  This means go home and stay home  Have someone else  your medications for you and bring them to you and drop them off at your door  Tests typically come back in 1-3 days  Results can be found in the "COVID-19" section of your MyChart account  In Your Home:  If you live with other people, trying to avoid common spaces and disinfect areas that you come into contact with    Per the CDC's recommendations: persons who suspect that they might have COVID-19 should isolate, stay at home, and use a separate bedroom and bathroom if feasible  Isolation should begin even before seeking testing and before test results become available  All household members should start wearing a mask in the home, particularly in shared spaces where appropriate distancing is not possible  Take Care of Yourself:  Try to sleep on your stomach as much as possible  If you have the ability to, take vitamin D3 2000 IU by mouth daily, vitamin-C 1000 mg by mouth twice a day, a multivitamin daily to help boost your immune system  You should check your temperature twice day  Go to the emergency department for any severe shortness of breath or pulse ox less than 90%  If You Test Positive for COVID-19 (Isolate)    Everyone, regardless of vaccination status:    · Stay home for 5 days  · If you have no symptoms or your symptoms are resolving after 5 days, you can leave your house  · Continue to wear a mask around others for 5 additional days  If you have a fever, continue to stay home until your fever resolves  If You Were Exposed to Someone with COVID-19 (Quarantine)  If you:  Have been boosted  OR  Completed the primary series of Pfizer or Moderna vaccine within the last 6 months  OR  Completed the primary series of J&J vaccine within the last 2 months    · Wear a mask around others for 10 days  · Test on day 5, if possible  If you develop symptoms get a test and stay home  If you:  Completed the primary series of Pfizer or Moderna vaccine over 6 months ago and are not boosted  OR  Completed the primary series of J&J over 2 months ago and are not boosted  OR  Are unvaccinated    · Stay home for 5 days  After that continue to wear a mask around others for 5 additional days  · If you cant quarantine you must wear a mask for 10 days  · Test on day 5 if possible      If you develop symptoms get a test and stay home

## 2022-01-26 NOTE — PATIENT INSTRUCTIONS
Testing Site Location Information    The most up-to-date information can be found at this link:  [email or web address]    [employer] (side of building), [address], [place]  · Monday - Friday: 3pm-6pm  · Drive-up and walk-up available    [address] [place] (behind the [facility]), [address], [address] [address] [address]  · Monday - Thursday: 8am-12pm, 5pm-8pm  · Friday: 8am-12pm  · Saturday: 9am-12pm     [facility], [address] [facility] [address]  · Monday - Thursday: 3pm-7pm  · Saturday: 9am-1pm     [address] [facility] [address] [facility] [address]  · Monday - Friday: 3pm-7pm    [address] [facility] [address] [place] [address]  [address]  · Monday - Thursday: 8am-12pm, 5pm-8pm  · Friday: 8am-12pm  · Saturday: 9am-12pm    Please Be Courteous:  You are being tested for novel coronavirus (COVID-19) your test is pending at this time  You need to self quarantine at least until you have the results back  This means go home and stay home  Have someone else  your medications for you and bring them to you and drop them off at your door  Tests typically come back in 1-3 days  Results can be found in the "COVID-19" section of your [employer] account  In Your Home:  If you live with other people, trying to avoid common spaces and disinfect areas that you come into contact with  Per the CDC's recommendations: persons who suspect that they might have COVID-19 should isolate, stay at home, and use a separate bedroom and bathroom if feasible  Isolation should begin even before seeking testing and before test results become available  All household members should start wearing a mask in the home, particularly in shared spaces where appropriate distancing is not possible  Take Care of Yourself:  Try to sleep on your stomach as much as possible    If you have the ability to, take vitamin D3 2000 IU by mouth daily, vitamin-C 1000 mg by mouth twice a day, a multivitamin daily to help boost your immune system  You should check your temperature twice day  Go to the emergency department for any severe shortness of breath or pulse ox less than 90%  If You Test Positive for COVID-19 (Isolate)    Everyone, regardless of vaccination status:    · Stay home for 5 days  · If you have no symptoms or your symptoms are resolving after 5 days, you can leave your house  · Continue to wear a mask around others for 5 additional days  If you have a fever, continue to stay home until your fever resolves  If You Were Exposed to Someone with COVID-19 (Quarantine)  If you:  Have been boosted  OR  Completed the primary series of Pfizer or Moderna vaccine within the last 6 months  OR  Completed the primary series of J&J vaccine within the last 2 months    · Wear a mask around others for 10 days  · Test on day 5, if possible  If you develop symptoms get a test and stay home  If you:  Completed the primary series of Pfizer or Moderna vaccine over 6 months ago and are not boosted  OR  Completed the primary series of J&J over 2 months ago and are not boosted  OR  Are unvaccinated    · Stay home for 5 days  After that continue to wear a mask around others for 5 additional days  · If you cant quarantine you must wear a mask for 10 days  · Test on day 5 if possible      If you develop symptoms get a test and stay home

## 2022-01-27 LAB
FLUAV RNA RESP QL NAA+PROBE: NEGATIVE
FLUBV RNA RESP QL NAA+PROBE: NEGATIVE
SARS-COV-2 RNA RESP QL NAA+PROBE: NEGATIVE

## 2022-01-28 ENCOUNTER — NEW PATIENT COMPREHENSIVE (OUTPATIENT)
Dept: URBAN - METROPOLITAN AREA CLINIC 6 | Facility: CLINIC | Age: 52
End: 2022-01-28

## 2022-01-28 DIAGNOSIS — E10.9: ICD-10-CM

## 2022-01-28 DIAGNOSIS — H40.1131: ICD-10-CM

## 2022-01-28 PROCEDURE — 76514 ECHO EXAM OF EYE THICKNESS: CPT

## 2022-01-28 PROCEDURE — 92004 COMPRE OPH EXAM NEW PT 1/>: CPT

## 2022-01-28 PROCEDURE — 92133 CPTRZD OPH DX IMG PST SGM ON: CPT

## 2022-01-28 ASSESSMENT — TONOMETRY
OD_IOP_MMHG: 15
OS_IOP_MMHG: 15

## 2022-01-28 ASSESSMENT — PACHYMETRY
OS_CT_UM: 509
OD_CT_UM: 538

## 2022-01-28 ASSESSMENT — VISUAL ACUITY
OU_SC: J2
OD_SC: 20/20-2
OS_SC: 20/20-2

## 2022-02-22 ENCOUNTER — OFFICE VISIT (OUTPATIENT)
Dept: DIABETES SERVICES | Facility: CLINIC | Age: 52
End: 2022-02-22
Payer: COMMERCIAL

## 2022-02-22 DIAGNOSIS — E10.65 TYPE 1 DIABETES MELLITUS WITH HYPERGLYCEMIA (HCC): ICD-10-CM

## 2022-02-22 PROCEDURE — 98960 EDU&TRN PT SELF-MGMT NQHP 1: CPT | Performed by: DIETITIAN, REGISTERED

## 2022-02-22 NOTE — PROGRESS NOTES
Carbohydrate Counting Instruction    Met with Asha Ovalle for carbohydrate counting  Asha Ovalle is currently on the following insulin regimen: Semglee 60 units, Novolog sliding scale (guesses dose)    Present at Session: patient     Patient Instructed on: Carbohydrate counting  Used [employer], Food labels, measuring cups, and other props to teach label reading, carbohydrate counting, monitoring portion control and food diary  Patient completed one day food diary exercise during session with little difficulty and needed minimal assistance  At this time, Asha Ovalle does demonstrate the math skills necessary for successful carbohydrate counting and following a flexible insulin regimen  Diabetes Education Record  Asha Ovalle received the following handouts: Portion Book, 3 day food logs      Patient response to instruction    Comprehensionvery good  Motivationvery good  Expected Compliancegood    When food logs returned and reviewed, if appropriate, will determine flexible insulin regimen with referring provider  Will then have  call Asha Yamile to schedule next visit  Thank you for referring your patient to [facility], it was a pleasure working with them today  Please feel free to call with any questions or concerns      [address] [address] [phone]

## 2022-02-22 NOTE — PATIENT INSTRUCTIONS
1  Keep 3 day food and insulin record and send in to me when complete  2  After review, we will call you to schedule flexible insulin teaching visit  3   Call or e-mail with any questions

## 2022-03-10 DIAGNOSIS — I10 HYPERTENSION, UNSPECIFIED TYPE: ICD-10-CM

## 2022-03-10 DIAGNOSIS — E10.65 TYPE 1 DIABETES MELLITUS WITH HYPERGLYCEMIA (HCC): ICD-10-CM

## 2022-03-10 DIAGNOSIS — E78.2 MIXED HYPERLIPIDEMIA: ICD-10-CM

## 2022-03-11 RX ORDER — INSULIN ASPART 100 [IU]/ML
INJECTION, SOLUTION INTRAVENOUS; SUBCUTANEOUS
Qty: 90 ML | Refills: 0 | Status: SHIPPED | OUTPATIENT
Start: 2022-03-11 | End: 2022-05-24 | Stop reason: SDUPTHER

## 2022-03-11 RX ORDER — RAMIPRIL 10 MG/1
10 CAPSULE ORAL DAILY
Qty: 90 CAPSULE | Refills: 0 | Status: SHIPPED | OUTPATIENT
Start: 2022-03-11 | End: 2022-06-14 | Stop reason: SDUPTHER

## 2022-03-11 RX ORDER — ATORVASTATIN CALCIUM 80 MG/1
80 TABLET, FILM COATED ORAL DAILY
Qty: 90 TABLET | Refills: 0 | Status: SHIPPED | OUTPATIENT
Start: 2022-03-11 | End: 2022-06-14 | Stop reason: SDUPTHER

## 2022-03-16 ENCOUNTER — OFFICE VISIT (OUTPATIENT)
Dept: FAMILY MEDICINE CLINIC | Facility: CLINIC | Age: 52
End: 2022-03-16
Payer: COMMERCIAL

## 2022-03-16 VITALS
RESPIRATION RATE: 16 BRPM | DIASTOLIC BLOOD PRESSURE: 78 MMHG | TEMPERATURE: 97.6 F | SYSTOLIC BLOOD PRESSURE: 138 MMHG | HEART RATE: 96 BPM | BODY MASS INDEX: 40.8 KG/M2 | WEIGHT: 291.4 LBS | HEIGHT: 71 IN | OXYGEN SATURATION: 96 %

## 2022-03-16 DIAGNOSIS — E66.01 CLASS 3 SEVERE OBESITY DUE TO EXCESS CALORIES WITH BODY MASS INDEX (BMI) OF 40.0 TO 44.9 IN ADULT, UNSPECIFIED WHETHER SERIOUS COMORBIDITY PRESENT (HCC): ICD-10-CM

## 2022-03-16 DIAGNOSIS — Z00.00 HEALTH CARE MAINTENANCE: Primary | ICD-10-CM

## 2022-03-16 DIAGNOSIS — Z12.11 SCREENING FOR COLON CANCER: ICD-10-CM

## 2022-03-16 DIAGNOSIS — I10 BENIGN ESSENTIAL HYPERTENSION: ICD-10-CM

## 2022-03-16 DIAGNOSIS — E10.65 TYPE 1 DIABETES MELLITUS WITH HYPERGLYCEMIA (HCC): ICD-10-CM

## 2022-03-16 DIAGNOSIS — E78.2 MIXED HYPERLIPIDEMIA: ICD-10-CM

## 2022-03-16 DIAGNOSIS — Z12.5 SCREENING FOR PROSTATE CANCER: ICD-10-CM

## 2022-03-16 PROCEDURE — 99396 PREV VISIT EST AGE 40-64: CPT | Performed by: FAMILY MEDICINE

## 2022-03-16 NOTE — PATIENT INSTRUCTIONS
Here for recheck and also needs to get BMI lower than 30 and is working to improve his labs and will continue with screening for colon cancer as he is now going to be 50years old  Take cholesterol med and diabetes meds and HTN med as directed  Call if any problems  Patient states his endocrinologist every 3 months and monitors BP and sees eye doctor every 6 months and takes Lumigan

## 2022-03-16 NOTE — PROGRESS NOTES
Patient's shoes and socks removed  Right Foot/Ankle   Right Foot Inspection  Skin Exam: skin normal and skin intact  No dry skin, no warmth, no callus, no erythema, no maceration, no abnormal color, no pre-ulcer, no ulcer and no callus  Toe Exam: ROM and strength within normal limits  Sensory   Monofilament testing: intact    Vascular  Capillary refills: < 3 seconds  The right DP pulse is 2+  The right PT pulse is 2+  Left Foot/Ankle  Left Foot Inspection  Skin Exam: skin normal and skin intact  No dry skin, no warmth, no erythema, no maceration, normal color, no pre-ulcer, no ulcer and no callus  Toe Exam: ROM and strength within normal limits  Sensory   Monofilament testing: intact    Vascular  Capillary refills: < 3 seconds  The left DP pulse is 2+  The left PT pulse is 2+       Assign Risk Category  No deformity present  No loss of protective sensation  No weak pulses  Risk: 0

## 2022-03-18 ENCOUNTER — TELEPHONE (OUTPATIENT)
Dept: FAMILY MEDICINE CLINIC | Facility: CLINIC | Age: 52
End: 2022-03-18

## 2022-03-18 ENCOUNTER — TELEPHONE (OUTPATIENT)
Dept: SLEEP CENTER | Facility: CLINIC | Age: 52
End: 2022-03-18

## 2022-03-18 DIAGNOSIS — G47.33 OSA (OBSTRUCTIVE SLEEP APNEA): Primary | ICD-10-CM

## 2022-03-18 NOTE — TELEPHONE ENCOUNTER
Patient left message stating he is having issues with his CPAP  Last night he woke up and saw that his machine said "motor failure"  [facility] needs a script sent for a new machine  Left message for patient  Advised that since he has never been seen in the sleep center, Dr Cielo Jerome cannot write a Rx for a CPAP machine until he is seen  He has consult scheduled 5/25/22  Advised to call office to see if any sooner appointments available  Phone number provided  Also advised that if he does not want to wait to be seen in the sleep center, he can reach out to PCP to see if they would be willing to write script

## 2022-03-18 NOTE — TELEPHONE ENCOUNTER
Spoke to patient who insists he was seen by Dr Marie Katz before and that Dr Marie Katz has been writing for his CPAP supplies since he got his machine in 2014  Advised patient there is no documentation of any office visits at the sleep center, sleep studies or resupply orders  I advised patient to call Dr Jitendra Yuan private office to see if he has been seen there in the past   Phone number provided  If not, he will reach out to his PCP

## 2022-03-18 NOTE — TELEPHONE ENCOUNTER
Patient called stating his CPAP machine broke last night  He is asking for a script to be sent to [facility]  His reading is 19  Please advise patient at [phone]

## 2022-03-18 NOTE — TELEPHONE ENCOUNTER
Spoke with patient to let him know that  did fax the order to [facility], and then he gave me a different fax number of [phone] and faxed it there as well

## 2022-04-01 ENCOUNTER — TELEPHONE (OUTPATIENT)
Dept: FAMILY MEDICINE CLINIC | Facility: CLINIC | Age: 52
End: 2022-04-01

## 2022-04-16 ENCOUNTER — TELEPHONE (OUTPATIENT)
Dept: GASTROENTEROLOGY | Facility: MEDICAL CENTER | Age: 52
End: 2022-04-16

## 2022-04-26 ENCOUNTER — TELEPHONE (OUTPATIENT)
Dept: GASTROENTEROLOGY | Facility: MEDICAL CENTER | Age: 52
End: 2022-04-26

## 2022-05-18 DIAGNOSIS — E10.65 TYPE 1 DIABETES MELLITUS WITH HYPERGLYCEMIA (HCC): ICD-10-CM

## 2022-05-19 RX ORDER — INSULIN GLARGINE-YFGN 100 [IU]/ML
INJECTION, SOLUTION SUBCUTANEOUS
Qty: 90 ML | Refills: 0 | Status: SHIPPED | OUTPATIENT
Start: 2022-05-19 | End: 2022-07-25 | Stop reason: SDUPTHER

## 2022-05-24 DIAGNOSIS — E10.65 TYPE 1 DIABETES MELLITUS WITH HYPERGLYCEMIA (HCC): ICD-10-CM

## 2022-05-25 ENCOUNTER — OFFICE VISIT (OUTPATIENT)
Dept: SLEEP CENTER | Facility: CLINIC | Age: 52
End: 2022-05-25
Payer: COMMERCIAL

## 2022-05-25 VITALS
HEIGHT: 71 IN | OXYGEN SATURATION: 98 % | DIASTOLIC BLOOD PRESSURE: 78 MMHG | WEIGHT: 298.2 LBS | HEART RATE: 86 BPM | SYSTOLIC BLOOD PRESSURE: 134 MMHG | BODY MASS INDEX: 41.75 KG/M2

## 2022-05-25 DIAGNOSIS — E11.9 TYPE 2 DIABETES MELLITUS WITHOUT COMPLICATION, WITH LONG-TERM CURRENT USE OF INSULIN (HCC): ICD-10-CM

## 2022-05-25 DIAGNOSIS — Z79.4 TYPE 2 DIABETES MELLITUS WITHOUT COMPLICATION, WITH LONG-TERM CURRENT USE OF INSULIN (HCC): ICD-10-CM

## 2022-05-25 DIAGNOSIS — F51.12 INSUFFICIENT SLEEP SYNDROME: ICD-10-CM

## 2022-05-25 DIAGNOSIS — G47.33 OSA (OBSTRUCTIVE SLEEP APNEA): Primary | ICD-10-CM

## 2022-05-25 DIAGNOSIS — E66.9 OBESITY (BMI 30-39.9): ICD-10-CM

## 2022-05-25 PROCEDURE — 99204 OFFICE O/P NEW MOD 45 MIN: CPT | Performed by: INTERNAL MEDICINE

## 2022-05-25 RX ORDER — INSULIN ASPART 100 [IU]/ML
INJECTION, SOLUTION INTRAVENOUS; SUBCUTANEOUS
Qty: 90 ML | Refills: 0 | Status: SHIPPED | OUTPATIENT
Start: 2022-05-25 | End: 2022-06-07

## 2022-05-25 NOTE — PROGRESS NOTES
Consultation - [address]  Niru Trotter  50 y o  male  HVL:0/00[birth year]02  YLA:[id number]  DOS:5/25/2022    Physician Requesting Consult: Chely Rubin DO             Reason for Consult : At your kind request I saw Richa Condon for initial sleep evaluation today  He was diagnosed with obstructive sleep apnea in the past and was prescribed CPAP  He got a replacement ResMed machine approximately a month ago  He is here to re-establish care  Results of prior studies:  A diagnostic study in 2009 demonstrated AHI of 34 per hour, higher during stage REM  Minimum oxygen saturation was 82% and 10 3% the study spent with saturations below 90%  During the subsequent therapeutic study, sleep disordered breathing was successfully remediated with nasal CPAP at 16 cm H2O  PFSH, Problem List, Medications & Allergies were reviewed in EMR  Emmanuel Dubose  has a past medical history of Diabetes mellitus ([facility] [address] ), Insulin pump in place (7/18/2019), 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), and Vitamin D deficiency  He has a current medication list which includes the following prescription(s): aspirin, atorvastatin, lumigan, vitamin d, dexcom g6 , dexcom g6 sensor, dexcom g6 transmitter, glucagon, glucose blood, insulin aspart, insulin glargine-yfgn, bd pen needle amilcar u/f, multivitamins plus zinc, onetouch delica lancets fine, and ramipril  HPI:  He is using CPAP regularly and benefits from use  He struggles to sleep when he is not using CPAP but with use is experiencing no snoring or breathing difficulties  He has a full face mask and reports dry mouth because of mouth breathing  He reported no nasal or respiratory symptoms  Compliance data shows use for more than 4 hours 98% of the time  Respiratory event index is 0 7 per hour at 95th percentile pressure of 18 7 cm H2O  Other Complaints: none  Restless Leg Syndrome: reports no suggestive symptoms      Parasomnia: no features reported Sleep Routine (on average):   Typical Bedtime:  10:00 p m  Gets OOB:  4:30 a m  TIB:6 5 hrs  Sleep latency:< 15 minutes Sleep Interruptions:1-2/nite able to fall back asleep  Awakens: spontaneously  Upon awakening: feels refreshed   Lennox Dines denies Excessive Daytime Sleepiness    He rated himself at Total score: 1 /24 on the Erwin Sleepiness Scale  Habits:  reports that he has never smoked  His smokeless tobacco use includes chew  ;   E-Cigarette/Vaping    E-Cigarette Use Never User     ;  reports no history of drug use ;  reports previous alcohol use  ; Caffeine use:limited ; Exercise routine: regular    Family History: Negative for sleep disturbance  ROS - reviewed and as attached  Significant for weight has been stable  Fei Dolan EXAM:  /78   Pulse 86   Ht 5' 11" (1 803 m)   Wt 135 kg (298 lb 3 2 oz)   SpO2 98%   BMI 41 59 kg/m²    General: Well groomed male, well appearing, in no apparent distress  Neurological: Alert and orientated;  cooperative; Cranial nerves intact;    Psychiatric: Speech:clear and coherent;  Normal mood, affect & thought   Skin: warm and dry; Color& Hydration good; no facial rashes or lesions   HEENT:  Craniofacial anatomy: normal Sinuses: non- tender  TMJ: Normal    Eyes: EOM's intact;  conjunctiva/corneas clear   Ears: Externallyappear normal     Nasal Airway: is patent Septum:intact; Mucosa: normal; Turbinates: normal; Rhinorrhea: None   Mouth: Lips: normal posture; Dentition: normal   Mucosa:moist  ; Hard Palate:normal    Oropharryx: crowded and AP narrowing Tongue: Mallampati:Class IV and MobileSoft Palate:  redundant  Tonsils: absent  Neck:is thick and excess fatty tissue; Neck Circumference: 19 "; Supple; no abnormal masses; Thyroid:normal  Trachea:central     Lymph: No Cervical or Submandibular Lymhadenopathy  Heart: S1,S2 normal; RRR; no gallop; no murmurs   Lungs: Respiratory Effort:normal  Air entry good bilaterally  No wheezes   No rales  Abdomen: Obese, Soft & non-tender    Extremities: No pedal edema  No clubbing or cyanosis  Musculoskeletal:  Motor normal; Gait:normal        IMPRESSION: Primary/Secondary Sleep Diagnoses (to Medical or Psych conditions) & Comorbidities   1  AYUSH (obstructive sleep apnea)  PAP DME Pressure Change    PAP DME Resupply/Reorder   2  Insufficient sleep syndrome     3  Obesity (BMI 30-39 9)     4  Type 2 diabetes mellitus without complication, with long-term current use of insulin ([facility] [address] )          PLAN:  1  I reviewed results of the Sleep study with the patient  2  With respect to above conditions, I counseled on pathophysiology, diagnosis, treatment options, risks and benefits; inter-relationship and effects on symptoms and comorbidities; risks of no treatment; costs and insurance aspects  3  Patient elected to continue positive airway pressure therapy and it is medically necessary  Pressure settin-20 cm H2O  4  I also advised on weight reduction  5  I also advised allowing sufficient opportunity for sleep  6  Follow-up to be scheduled in 1 year or sooner if needed  Sincerely,     Authenticated electronically by Rubén Camargo MD   on 15/47/21   Board Certified Specialist     Portions of the record may have been created with voice recognition software  Occasional wrong word or "sound a like" substitutions may have occurred due to the inherent limitations of voice recognition software  There may also be notations and random deletions of words or characters from malfunctioning software  Read the chart carefully and recognize, using context, where substitutions/deletions have occurred

## 2022-05-25 NOTE — PATIENT INSTRUCTIONS

## 2022-05-25 NOTE — PROGRESS NOTES
Review of Systems      Genitourinary none   Cardiology none   Gastrointestinal none   Neurology none   Constitutional none   Integumentary none   Psychiatry none   Musculoskeletal back pain   Pulmonary none   ENT none   Endocrine none   Hematological none

## 2022-05-26 ENCOUNTER — TELEPHONE (OUTPATIENT)
Dept: SLEEP CENTER | Facility: CLINIC | Age: 52
End: 2022-05-26

## 2022-06-01 ENCOUNTER — OFFICE VISIT (OUTPATIENT)
Dept: ENDOCRINOLOGY | Facility: CLINIC | Age: 52
End: 2022-06-01
Payer: COMMERCIAL

## 2022-06-01 VITALS
BODY MASS INDEX: 41.44 KG/M2 | DIASTOLIC BLOOD PRESSURE: 78 MMHG | HEIGHT: 71 IN | WEIGHT: 296 LBS | SYSTOLIC BLOOD PRESSURE: 138 MMHG

## 2022-06-01 DIAGNOSIS — E04.1 THYROID NODULE: ICD-10-CM

## 2022-06-01 DIAGNOSIS — E78.2 MIXED HYPERLIPIDEMIA: ICD-10-CM

## 2022-06-01 DIAGNOSIS — I10 BENIGN ESSENTIAL HYPERTENSION: ICD-10-CM

## 2022-06-01 DIAGNOSIS — E10.65 TYPE 1 DIABETES MELLITUS WITH HYPERGLYCEMIA (HCC): Primary | ICD-10-CM

## 2022-06-01 PROCEDURE — 99214 OFFICE O/P EST MOD 30 MIN: CPT | Performed by: NURSE PRACTITIONER

## 2022-06-01 PROCEDURE — 95251 CONT GLUC MNTR ANALYSIS I&R: CPT | Performed by: NURSE PRACTITIONER

## 2022-06-01 NOTE — PROGRESS NOTES
Established Patient Progress Note      Chief Complaint   Patient presents with    Diabetes Type 1        History of Present Illness:   Kathe Lerma is a 50 y o  male with HTN, HLD, thyroid nodule, and type 1 diabetes with long term use of insulin since 1999  Reports complications of neuropathy and retinopathy  Denies recent illness or hospitalizations  Denies recent severe hypoglycemic or severe hyperglycemic episodes  Denies any issues with his current regimen  home glucose monitoring: are performed regularly with Dexcom G6  At patient's last appointment on 12/29/2021, pre-dinner NovoLog was increased  We discussed initiating a GLP 1  He was also given a referral to meet with the diabetes educator for carbohydrate counting  He was to complete a food diary and return to diabetes educator  At that point, they were to reschedule an appointment for flexible insulin therapy  It appears this did not occur  Component      Latest Ref Rng & Units 8/11/2020 5/15/2021 12/29/2021          10:34 AM  7:27 AM  2:29 PM   Hemoglobin A1C      6 5 7 0 (H) 6 7 (H) 7 0 (A)        POC HgA1C 7 6%    Current regimen:    Semglee 60 units twice daily (taking 50 units)   NovoLog sliding scale    IC 1:10    Kathe Lerma   Device used Dexcom G6  Home use     Indication   Type 1 Diabetes    More than 72 hours of data was reviewed  Report to be scanned to chart  Date Range:  May 19, 2022-June 1, 2022    Analysis of data:   Average Glucose:  151 mg/dL  Coefficient of Variation:  45 2%   SD :  68 mg/dL   Time in Target Range:  60 6%   Time Above Range:  25 8% greater than 180 mg/dL; 10 5% greater than 250 mg/dL   Time Below Range:  5 6% less than 70 mg/dL; 1 7% less than 54 mg/dL     Interpretation of data:  Patient has a pattern nighttime highs consistently between to 1:30a and 6:15 a m  He is also experiencing a significant amount of hypoglycemia throughout the day   He is eating later in the day and he continues to stack his insulin when his blood sugars are high leading to rapid fluctuations in blood sugars  He also overcorrects his hypoglycemia  Last Eye Exam: UTD  Last Foot Exam: UTD    For thyroid nodules, an ultrasound was ordered at his last appointment for him to complete  He has yet to do so  For obstructive sleep apnea, he is working with Sleep Medicine  He recently received a new CPAP machine and is using this faithfully  For hypertension, he is taking 10 mg of ramipril daily  He denies headache and cough  For hyperlipidemia, he is taking 80 mg of atorvastatin nightly  He denies myalgias      Patient Active Problem List   Diagnosis    Type 1 diabetes mellitus with hyperglycemia (HCC)    Mixed hyperlipidemia    Thyroid nodule    Benign essential hypertension    Erectile dysfunction of non-organic origin    Low back pain with sciatica    Lumbar radiculopathy      Past Medical History:   Diagnosis Date    Diabetes mellitus ([facility] [address] )     Insulin pump in place 7/18/2019    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    Vitamin D deficiency       Past Surgical History:   Procedure Laterality Date    NO PAST SURGERIES        Family History   Problem Relation Age of Onset    Lung cancer Mother     Cancer Maternal Grandfather     Diabetes Maternal Grandfather     Diabetes Family     Other Family         cardiac disorder    Hypertension Family      Social History     Tobacco Use    Smoking status: Never Smoker    Smokeless tobacco: Current User     Types: Chew    Tobacco comment: used chewing tobacco for 20 years   Substance Use Topics    Alcohol use: Not Currently     Comment: socially     No Known Allergies      Current Outpatient Medications:     aspirin 81 MG tablet, Take 1 tablet by mouth daily, Disp: , Rfl:     atorvastatin (LIPITOR) 80 mg tablet, Take 1 tablet (80 mg total) by mouth daily, Disp: 90 tablet, Rfl: 0    bimatoprost (Lumigan) 0 01 % ophthalmic drops, Administer 1 drop to both eyes daily at bedtime, Disp: 5 mL, Rfl: 0    Cholecalciferol (VITAMIN D) 2000 units CAPS, Take by mouth, Disp: , Rfl:     Continuous Blood Gluc  (DEXCOM G6 ) SHARON, Use daily as directed for CGM, Disp: 1 Device, Rfl: 0    Continuous Blood Gluc Sensor (Dexcom G6 Sensor) MISC, Use daily as directed for CGM - Change every 10 days, Disp: 9 each, Rfl: 1    Continuous Blood Gluc Transmit (Dexcom G6 Transmitter) MISC, Use daily as directed for CGM - Change every 3 months, Disp: 1 each, Rfl: 3    glucagon 1 MG injection, Inject as directed Twice daily, Disp: , Rfl:     insulin aspart (NovoLOG FlexPen) 100 UNIT/ML injection pen, Inject up to a maximum of 105 units based on sliding scale and carb coverage  , Disp: 90 mL, Rfl: 0    Insulin Glargine-yfgn (Semglee, yfgn,) 100 UNIT/ML SOPN, Inject 60 units twice daily  , Disp: 90 mL, Rfl: 0    Insulin Pen Needle (BD Pen Needle Ashli U/F) 32G X 4 MM MISC, Use 4 per day, Disp: 100 each, Rfl: 3    Multiple Vitamins-Minerals (MULTIVITAMINS PLUS ZINC) CAPS, Take by mouth, Disp: , Rfl:     ONETOUCH DELICA LANCETS FINE MISC, by Does not apply route, Disp: , Rfl:     glucose blood (ONE TOUCH ULTRA TEST) test strip, Test 6x daily (Patient not taking: Reported on 6/1/2022), Disp: 600 each, Rfl: 3    ramipril (ALTACE) 10 MG capsule, Take 1 capsule (10 mg total) by mouth daily, Disp: 90 capsule, Rfl: 0    Review of Systems   Constitutional: Positive for fatigue  Negative for activity change, appetite change and unexpected weight change  HENT: Negative for dental problem, sore throat, trouble swallowing and voice change  Eyes: Positive for visual disturbance  Respiratory: Negative for cough, chest tightness and shortness of breath  Cardiovascular: Negative for chest pain, palpitations and leg swelling  Gastrointestinal: Negative for constipation, diarrhea, nausea and vomiting  Endocrine: Positive for polydipsia and polyuria   Negative for polyphagia  Genitourinary: Positive for frequency  Musculoskeletal: Negative for arthralgias, back pain, gait problem and myalgias  Skin: Negative for wound  Allergic/Immunologic: Negative for environmental allergies and food allergies  Neurological: Positive for numbness  Negative for dizziness, weakness, light-headedness and headaches  Hematological: Does not bruise/bleed easily  Psychiatric/Behavioral: Negative for decreased concentration, dysphoric mood and sleep disturbance  The patient is not nervous/anxious  Physical Exam:  Body mass index is 41 28 kg/m²  /78   Ht 5' 11" (1 803 m)   Wt 134 kg (296 lb)   BMI 41 28 kg/m²    Wt Readings from Last 3 Encounters:   06/01/22 134 kg (296 lb)   05/25/22 135 kg (298 lb 3 2 oz)   03/16/22 132 kg (291 lb 6 4 oz)       Physical Exam  Vitals reviewed  Constitutional:       General: He is not in acute distress  Appearance: He is well-developed  He is obese  He is not ill-appearing  HENT:      Head: Normocephalic and atraumatic  Eyes:      Pupils: Pupils are equal, round, and reactive to light  Neck:      Thyroid: No thyromegaly  Cardiovascular:      Rate and Rhythm: Normal rate and regular rhythm  Pulses: Normal pulses  Heart sounds: Normal heart sounds  Pulmonary:      Effort: Pulmonary effort is normal       Breath sounds: Normal breath sounds  Abdominal:      General: Bowel sounds are normal  There is no distension  Palpations: Abdomen is soft  Tenderness: There is no abdominal tenderness  Musculoskeletal:      Cervical back: Normal range of motion and neck supple  Right lower leg: No edema  Left lower leg: No edema  Lymphadenopathy:      Cervical: No cervical adenopathy  Skin:     General: Skin is warm and dry  Capillary Refill: Capillary refill takes less than 2 seconds  Neurological:      Mental Status: He is alert and oriented to person, place, and time        Gait: Gait normal    Psychiatric:         Mood and Affect: Mood normal          Behavior: Behavior normal            Labs:   Lab Results   Component Value Date    HGBA1C 7 0 (A) 12/29/2021    HGBA1C 6 7 (H) 05/15/2021    HGBA1C 7 0 (H) 08/11/2020     Lab Results   Component Value Date    CREATININE 0 96 12/31/2021    CREATININE 0 86 05/15/2021    CREATININE 1 04 08/11/2020    BUN 21 12/31/2021     11/06/2015    K 4 4 12/31/2021     12/31/2021    CO2 32 12/31/2021     eGFR   Date Value Ref Range Status   12/31/2021 92 ml/min/1 73sq m Final     Lab Results   Component Value Date    CHOL 148 08/04/2015    HDL 34 (L) 12/31/2021    TRIG 74 12/31/2021     Lab Results   Component Value Date    ALT 66 12/31/2021    AST 32 12/31/2021    ALKPHOS 86 12/31/2021    BILITOT 0 61 11/06/2015     Lab Results   Component Value Date    YMO2ZKWTIFLO 2 150 08/11/2020    WNR7HRXVRLXI 1 460 07/25/2019    KCT1UVAODNSN 1 580 04/04/2018     Lab Results   Component Value Date    FREET4 0 88 08/11/2020       Impression & Plan:    Problem List Items Addressed This Visit        Endocrine    Type 1 diabetes mellitus with hyperglycemia ([facility] [address] ) - Primary     Patient remains poorly controlled with significant fluctuations that are primarily due to diet and over use of correction insulin  Given detailed instruction on how to treat for hypoglycemia  Given detailed instruction on the importance of covering his carbohydrates and correcting for his blood sugar prior to eating rather than stacking his insulin after his blood sugars are elevated  Recommend an ISF of 30 with BG goal of 120 for now  He will make an effort to follow the provided instructions and we will arrange for him to follow up with the diabetes educator for further instruction     Lab Results   Component Value Date    HGBA1C 7 0 (A) 12/29/2021              Relevant Orders    Hemoglobin A1C    Comprehensive metabolic panel    Lipid Panel with Direct LDL reflex    Microalbumin / creatinine urine ratio    Thyroid nodule     Reminded patient to complete the ultrasound of his thyroid  Cardiovascular and Mediastinum    Benign essential hypertension     /78  Continue current regimen  Other    Mixed hyperlipidemia     Check fasting lipid panel  Continue statin  Orders Placed This Encounter   Procedures    Hemoglobin A1C     Standing Status:   Future     Standing Expiration Date:   6/1/2023    Comprehensive metabolic panel     This is a patient instruction: Patient fasting for 8 hours or longer recommended  Standing Status:   Future     Standing Expiration Date:   6/1/2023    Lipid Panel with Direct LDL reflex     This is a patient instruction: This test requires patient fasting for 10-12 hours or longer  Drinking of black coffee or black tea is acceptable  Standing Status:   Future     Standing Expiration Date:   6/1/2023    Microalbumin / creatinine urine ratio     Standing Status:   Future     Standing Expiration Date:   6/1/2023       There are no Patient Instructions on file for this visit  Discussed with the patient and all questioned fully answered  He will call me if any problems arise  Follow-up appointment in 3 months       Counseled patient on diagnostic results, prognosis, risk and benefit of treatment options, instruction for management, importance of treatment compliance, Risk  factor reduction and impressions    LISA Alvarez

## 2022-06-01 NOTE — ASSESSMENT & PLAN NOTE
Patient remains poorly controlled with significant fluctuations that are primarily due to diet and over use of correction insulin  Given detailed instruction on how to treat for hypoglycemia  Given detailed instruction on the importance of covering his carbohydrates and correcting for his blood sugar prior to eating rather than stacking his insulin after his blood sugars are elevated  Recommend an ISF of 30 with BG goal of 120 for now  He will make an effort to follow the provided instructions and we will arrange for him to follow up with the diabetes educator for further instruction     Lab Results   Component Value Date    HGBA1C 7 0 (A) 12/29/2021

## 2022-06-07 ENCOUNTER — TELEPHONE (OUTPATIENT)
Dept: ADMINISTRATIVE | Facility: OTHER | Age: 52
End: 2022-06-07

## 2022-06-07 ENCOUNTER — OFFICE VISIT (OUTPATIENT)
Dept: FAMILY MEDICINE CLINIC | Facility: CLINIC | Age: 52
End: 2022-06-07
Payer: COMMERCIAL

## 2022-06-07 VITALS
OXYGEN SATURATION: 98 % | BODY MASS INDEX: 41.44 KG/M2 | RESPIRATION RATE: 16 BRPM | TEMPERATURE: 97.5 F | WEIGHT: 296 LBS | DIASTOLIC BLOOD PRESSURE: 82 MMHG | HEART RATE: 81 BPM | SYSTOLIC BLOOD PRESSURE: 120 MMHG | HEIGHT: 71 IN

## 2022-06-07 DIAGNOSIS — I10 BENIGN ESSENTIAL HYPERTENSION: ICD-10-CM

## 2022-06-07 DIAGNOSIS — H10.13 ALLERGIC CONJUNCTIVITIS OF BOTH EYES: ICD-10-CM

## 2022-06-07 DIAGNOSIS — G47.33 OSA (OBSTRUCTIVE SLEEP APNEA): Primary | ICD-10-CM

## 2022-06-07 DIAGNOSIS — Z12.11 SCREENING FOR COLON CANCER: ICD-10-CM

## 2022-06-07 DIAGNOSIS — E10.65 TYPE 1 DIABETES MELLITUS WITH HYPERGLYCEMIA (HCC): ICD-10-CM

## 2022-06-07 PROCEDURE — 99214 OFFICE O/P EST MOD 30 MIN: CPT | Performed by: FAMILY MEDICINE

## 2022-06-07 RX ORDER — OLOPATADINE HYDROCHLORIDE 1 MG/ML
1 SOLUTION/ DROPS OPHTHALMIC 2 TIMES DAILY
Qty: 5 ML | Refills: 2 | Status: SHIPPED | OUTPATIENT
Start: 2022-06-07

## 2022-06-07 NOTE — TELEPHONE ENCOUNTER
----- Message from Rach Kauffman sent at 6/7/2022  9:15 AM EDT -----  Regarding: DM Eye exam  06/07/22 9:16 AM    Hello, our patient Fortunato Oglesby has had Diabetic Eye Exam completed/performed  Please assist in updating the patient chart by pulling the document from the Media Tab  The date of service is 1/31/2022       Thank you,  Maris Velez  [id number]

## 2022-06-07 NOTE — PROGRESS NOTES
Assessment/Plan:  Chief Complaint   Patient presents with    Follow-up     CPAP compliance      Patient Instructions   Here for cpap recheck and needs cpap which helps, he did see sleep specialist and is doing well with machine and also rec starting patanol eye drops as directed prn allergy eyes  HTN stable  Low sugar diet encouraged  No problem-specific Assessment & Plan notes found for this encounter  Diagnoses and all orders for this visit:    AYUSH (obstructive sleep apnea)    Type 1 diabetes mellitus with hyperglycemia (HCC)  Comments:  sees endo, low sugar diet encouraged  Benign essential hypertension    Allergic conjunctivitis of both eyes  -     olopatadine (PATANOL) 0 1 % ophthalmic solution; Administer 1 drop to both eyes 2 (two) times a day    Screening for colon cancer  -     Ambulatory Referral to Gastroenterology; Future          Subjective:      Patient ID: Sonja Carolina is a 50 y o  male  Follow-up (CPAP compliance ) He saw sleep doctor and is needing a new CPAP machine  It definitely helps with sleep  The following portions of the patient's history were reviewed and updated as appropriate: allergies, current medications, past family history, past medical history, past social history, past surgical history and problem list     Review of Systems   Constitutional: Negative  HENT: Negative  Eyes: Negative  Respiratory: Negative  Cardiovascular: Negative  Gastrointestinal: Negative  Endocrine: Negative  Genitourinary: Negative  Musculoskeletal: Negative  Skin: Negative  Allergic/Immunologic: Negative  Neurological: Negative  Hematological: Negative  Psychiatric/Behavioral: Negative  Objective:      /82   Pulse 81   Temp 97 5 °F (36 4 °C) (Temporal)   Resp 16   Ht 5' 11" (1 803 m)   Wt 134 kg (296 lb)   SpO2 98%   BMI 41 28 kg/m²          Physical Exam  Constitutional:       Appearance: He is well-developed     HENT: Head: Normocephalic and atraumatic  Right Ear: External ear normal       Left Ear: External ear normal    Eyes:      Conjunctiva/sclera: Conjunctivae normal       Pupils: Pupils are equal, round, and reactive to light  Cardiovascular:      Rate and Rhythm: Normal rate and regular rhythm  Heart sounds: Normal heart sounds  Pulmonary:      Effort: Pulmonary effort is normal       Breath sounds: Normal breath sounds  Musculoskeletal:         General: Normal range of motion  Cervical back: Normal range of motion and neck supple  Skin:     General: Skin is warm and dry  Neurological:      Mental Status: He is alert and oriented to person, place, and time  Deep Tendon Reflexes: Reflexes are normal and symmetric     Psychiatric:         Behavior: Behavior normal

## 2022-06-07 NOTE — TELEPHONE ENCOUNTER
Upon review of the In Basket request and the patient's chart, initial outreach has been made via fax, please see Contacts section for details       Thank you  Dustin Neal MA

## 2022-06-07 NOTE — PATIENT INSTRUCTIONS
Here for cpap recheck and needs cpap which helps, he did see sleep specialist and is doing well with machine and also rec starting patanol eye drops as directed prn allergy eyes  HTN stable  Low sugar diet encouraged

## 2022-06-07 NOTE — LETTER
Diabetic Eye Exam Form    Date Requested: 22  Patient: Michelle Barillas  Patient : 1970   Referring Provider: Dennis Villasenor DO    DIABETIC Eye Exam Date _______________________________    Type of Exam MUST be documented for Diabetic Eye Exams  Please CHECK ONE  Retinal Exam       Dilated Retinal Exam       OCT       Optomap-Iris Exam      Fundus Photography     Left Eye - Please check Retinopathy AND Type or No Retinopathy      Exam did show retinopathy    Exam did not show retinopathy         Mild     Proliferative           Moderate    Severe            None         Right Eye - Please check Retinopathy AND Type or No Retinopathy     Exam did show retinopathy    Exam did not show retinopathy         Mild     Proliferative        Moderate    Severe        None       Comments __________________________________________________________    Practice Providing Exam ______________________________________________    Exam Performed By (print name) _______________________________________      Provider Signature ___________________________________________________    These reports are needed for  compliance  Please fax this completed form and a copy of the Diabetic Eye Exam report to our office located at [place] [address] as soon as possible via [phone] lidya Martines: Phone [phone]  We thank you for your assistance in treating our mutual patient

## 2022-06-14 DIAGNOSIS — E78.2 MIXED HYPERLIPIDEMIA: ICD-10-CM

## 2022-06-14 DIAGNOSIS — I10 HYPERTENSION, UNSPECIFIED TYPE: ICD-10-CM

## 2022-06-15 RX ORDER — RAMIPRIL 10 MG/1
10 CAPSULE ORAL DAILY
Qty: 90 CAPSULE | Refills: 0 | Status: SHIPPED | OUTPATIENT
Start: 2022-06-15

## 2022-06-15 RX ORDER — ATORVASTATIN CALCIUM 80 MG/1
80 TABLET, FILM COATED ORAL DAILY
Qty: 90 TABLET | Refills: 0 | Status: SHIPPED | OUTPATIENT
Start: 2022-06-15

## 2022-06-15 NOTE — TELEPHONE ENCOUNTER
Upon review of the In Basket request we were able to locate, review, and update the patient chart as requested for Diabetic Eye Exam     Any additional questions or concerns should be emailed to the Practice Liaisons via [email or web address] email, please do not reply via In Basket      Thank you  Yue Marks MA

## 2022-07-05 ENCOUNTER — TELEPHONE (OUTPATIENT)
Dept: PAIN MEDICINE | Facility: CLINIC | Age: 52
End: 2022-07-05

## 2022-07-05 NOTE — TELEPHONE ENCOUNTER
Ok to schedule the patient for repeat right L5 and S1 TFESI followed by follow-up office visit 4-6 weeks later

## 2022-07-07 DIAGNOSIS — M54.16 LUMBAR RADICULOPATHY: Primary | ICD-10-CM

## 2022-07-11 ENCOUNTER — TELEPHONE (OUTPATIENT)
Dept: RADIOLOGY | Facility: CLINIC | Age: 52
End: 2022-07-11

## 2022-07-11 DIAGNOSIS — M79.18 MYOFASCIAL PAIN: Primary | ICD-10-CM

## 2022-07-11 RX ORDER — METHOCARBAMOL 750 MG/1
750 TABLET, FILM COATED ORAL EVERY 8 HOURS PRN
Qty: 90 TABLET | Refills: 1 | Status: SHIPPED | OUTPATIENT
Start: 2022-07-11

## 2022-07-11 NOTE — TELEPHONE ENCOUNTER
Prescription for methocarbamol 750 mg q 8 hours p r n  sent to pharmacy to help with pain until the patient is able to have his procedure completed

## 2022-07-12 DIAGNOSIS — E10.65 TYPE 1 DIABETES MELLITUS WITH HYPERGLYCEMIA (HCC): ICD-10-CM

## 2022-07-12 RX ORDER — BLOOD-GLUCOSE TRANSMITTER
EACH MISCELLANEOUS
Qty: 1 EACH | Refills: 0 | Status: SHIPPED | OUTPATIENT
Start: 2022-07-12 | End: 2022-10-20 | Stop reason: SDUPTHER

## 2022-07-14 ENCOUNTER — OFFICE VISIT (OUTPATIENT)
Dept: FAMILY MEDICINE CLINIC | Facility: CLINIC | Age: 52
End: 2022-07-14
Payer: COMMERCIAL

## 2022-07-14 VITALS
TEMPERATURE: 97.6 F | WEIGHT: 295.6 LBS | RESPIRATION RATE: 16 BRPM | DIASTOLIC BLOOD PRESSURE: 80 MMHG | SYSTOLIC BLOOD PRESSURE: 128 MMHG | HEIGHT: 71 IN | BODY MASS INDEX: 41.38 KG/M2 | HEART RATE: 94 BPM | OXYGEN SATURATION: 97 %

## 2022-07-14 DIAGNOSIS — H61.22 IMPACTED CERUMEN OF LEFT EAR: Primary | ICD-10-CM

## 2022-07-14 PROCEDURE — 99213 OFFICE O/P EST LOW 20 MIN: CPT | Performed by: FAMILY MEDICINE

## 2022-07-14 NOTE — PROGRESS NOTES
Assessment/Plan:  Chief Complaint   Patient presents with    Cerumen Impaction     L ear Impacted  might be wax, uncomfortable cannot hear     Patient Instructions   Left ear hearing decreased from left cerumen impaction and ear         No problem-specific Assessment & Plan notes found for this encounter  Diagnoses and all orders for this visit:    Impacted cerumen of left ear  -     Ear cerumen removal  -     Ambulatory Referral to Otolaryngology; Future          Subjective:      Patient ID: Yuri Abraham is a 50 y o  male  Cerumen Impaction (L ear Impacted  might be wax, uncomfortable cannot hear) and used ear wax removal kit done by wife and no relief, started last week  The following portions of the patient's history were reviewed and updated as appropriate: allergies, current medications, past family history, past medical history, past social history, past surgical history and problem list     Review of Systems   Constitutional: Negative  HENT:        Left ear cerumen impaction   Eyes: Negative  Respiratory: Negative  Cardiovascular: Negative  Gastrointestinal: Negative  Endocrine: Negative  Genitourinary: Negative  Musculoskeletal: Negative  Skin: Negative  Allergic/Immunologic: Negative  Neurological: Negative  Hematological: Negative  Psychiatric/Behavioral: Negative  Objective:      /80 (BP Location: Left arm, Patient Position: Sitting, Cuff Size: Large)   Pulse 94   Temp 97 6 °F (36 4 °C) (Temporal)   Resp 16   Ht 5' 11" (1 803 m)   Wt 134 kg (295 lb 9 6 oz)   SpO2 97%   BMI 41 23 kg/m²          Physical Exam  Constitutional:       Appearance: He is well-developed  HENT:      Head: Normocephalic and atraumatic  Right Ear: Tympanic membrane, ear canal and external ear normal       Left Ear: There is impacted cerumen     Eyes:      Conjunctiva/sclera: Conjunctivae normal       Pupils: Pupils are equal, round, and reactive to light  Cardiovascular:      Rate and Rhythm: Normal rate and regular rhythm  Heart sounds: Normal heart sounds  Pulmonary:      Effort: Pulmonary effort is normal       Breath sounds: Normal breath sounds  Musculoskeletal:         General: Normal range of motion  Cervical back: Normal range of motion and neck supple  Skin:     General: Skin is warm and dry  Neurological:      Mental Status: He is alert and oriented to person, place, and time  Deep Tendon Reflexes: Reflexes are normal and symmetric     Psychiatric:         Behavior: Behavior normal

## 2022-07-25 DIAGNOSIS — E10.65 TYPE 1 DIABETES MELLITUS WITH HYPERGLYCEMIA (HCC): ICD-10-CM

## 2022-07-25 RX ORDER — BLOOD-GLUCOSE SENSOR
EACH MISCELLANEOUS
Qty: 9 EACH | Refills: 0 | Status: SHIPPED | OUTPATIENT
Start: 2022-07-25 | End: 2022-07-27 | Stop reason: SDUPTHER

## 2022-07-26 RX ORDER — INSULIN GLARGINE-YFGN 100 [IU]/ML
INJECTION, SOLUTION SUBCUTANEOUS
Qty: 90 ML | Refills: 0 | Status: SHIPPED | OUTPATIENT
Start: 2022-07-26

## 2022-07-27 ENCOUNTER — HOSPITAL ENCOUNTER (OUTPATIENT)
Dept: RADIOLOGY | Facility: CLINIC | Age: 52
Discharge: HOME | End: 2022-07-27
Payer: COMMERCIAL

## 2022-07-27 VITALS
HEART RATE: 87 BPM | RESPIRATION RATE: 20 BRPM | TEMPERATURE: 98.4 F | SYSTOLIC BLOOD PRESSURE: 154 MMHG | OXYGEN SATURATION: 97 % | DIASTOLIC BLOOD PRESSURE: 84 MMHG

## 2022-07-27 DIAGNOSIS — M54.16 LUMBAR RADICULOPATHY: ICD-10-CM

## 2022-07-27 DIAGNOSIS — E10.65 TYPE 1 DIABETES MELLITUS WITH HYPERGLYCEMIA (HCC): ICD-10-CM

## 2022-07-27 PROCEDURE — A9585 GADOBUTROL INJECTION: HCPCS | Performed by: ANESTHESIOLOGY

## 2022-07-27 RX ORDER — PAPAVERINE HCL 150 MG
20 CAPSULE, EXTENDED RELEASE ORAL ONCE
Status: COMPLETED | OUTPATIENT
Start: 2022-07-27 | End: 2022-07-27

## 2022-07-27 RX ORDER — BLOOD-GLUCOSE SENSOR
EACH MISCELLANEOUS
Qty: 9 EACH | Refills: 0 | Status: SHIPPED | OUTPATIENT
Start: 2022-07-27 | End: 2022-10-08 | Stop reason: SDUPTHER

## 2022-07-27 RX ADMIN — DEXAMETHASONE SODIUM PHOSPHATE 15 MG: 10 INJECTION, SOLUTION INTRAMUSCULAR; INTRAVENOUS at 08:24

## 2022-07-27 RX ADMIN — GADOBUTROL 2 ML: 604.72 INJECTION INTRAVENOUS at 08:24

## 2022-07-27 RX ADMIN — LIDOCAINE HYDROCHLORIDE 2 ML: 20 INJECTION, SOLUTION EPIDURAL; INFILTRATION; INTRACAUDAL; PERINEURAL at 08:24

## 2022-07-27 NOTE — H&P
History of Present Illness: The patient is a 50 y o  male who presents with complaints of low back and leg pain  Patient Active Problem List   Diagnosis    Type 1 diabetes mellitus with hyperglycemia (HCC)    Mixed hyperlipidemia    Thyroid nodule    Benign essential hypertension    Erectile dysfunction of non-organic origin    Low back pain with sciatica    Lumbar radiculopathy       Past Medical History:   Diagnosis Date    Diabetes mellitus ([facility] [address] [facility]     Insulin pump in place 07/18/2019    AYUSH on CPAP     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    Vitamin D deficiency        Past Surgical History:   Procedure Laterality Date    NO PAST SURGERIES           Current Outpatient Medications:     aspirin 81 MG tablet, Take 1 tablet by mouth daily, Disp: , Rfl:     atorvastatin (LIPITOR) 80 mg tablet, Take 1 tablet (80 mg total) by mouth daily, Disp: 90 tablet, Rfl: 0    bimatoprost (Lumigan) 0 01 % ophthalmic drops, Administer 1 drop to both eyes daily at bedtime, Disp: 5 mL, Rfl: 0    Cholecalciferol (VITAMIN D) 2000 units CAPS, Take by mouth, Disp: , Rfl:     Continuous Blood Gluc  (DEXCOM G6 ) SHARON, Use daily as directed for CGM, Disp: 1 Device, Rfl: 0    Continuous Blood Gluc Sensor (Dexcom G6 Sensor) MISC, Use daily as directed for CGM - Change every 10 days, Disp: 9 each, Rfl: 0    Continuous Blood Gluc Transmit (Dexcom G6 Transmitter) MISC, Use daily as directed for CGM - Change every 3 months, Disp: 1 each, Rfl: 0    glucagon 1 MG injection, Inject as directed Twice daily, Disp: , Rfl:     glucose blood (ONE TOUCH ULTRA TEST) test strip, Test 6x daily (Patient not taking: No sig reported), Disp: 600 each, Rfl: 3    Insulin Glargine-yfgn (Semglee, yfgn,) 100 UNIT/ML SOPN, Inject 60 units twice daily  , Disp: 90 mL, Rfl: 0    Insulin Pen Needle (BD Pen Needle Ashli U/F) 32G X 4 MM MISC, Use 4 per day, Disp: 100 each, Rfl: 3    methocarbamol (Robaxin-750) 750 mg tablet, Take 1 tablet (750 mg total) by mouth every 8 (eight) hours as needed for muscle spasms, Disp: 90 tablet, Rfl: 1    Multiple Vitamins-Minerals (MULTIVITAMINS PLUS ZINC) CAPS, Take by mouth, Disp: , Rfl:     olopatadine (PATANOL) 0 1 % ophthalmic solution, Administer 1 drop to both eyes 2 (two) times a day (Patient not taking: No sig reported), Disp: 5 mL, Rfl: 2    ONETOUCH DELICA LANCETS FINE MISC, by Does not apply route, Disp: , Rfl:     ramipril (ALTACE) 10 MG capsule, Take 1 capsule (10 mg total) by mouth daily, Disp: 90 capsule, Rfl: 0    No Known Allergies    Physical Exam:   Vitals:    07/27/22 0806   BP: 135/77   Pulse: 80   Resp: 20   Temp: 98 4 °F (36 9 °C)   SpO2: 96%     General: Awake, Alert, Oriented x 3, Mood and affect appropriate  Respiratory: Respirations even and unlabored  Cardiovascular: Peripheral pulses intact; no edema  Musculoskeletal Exam:  Bilateral lumbar paraspinals tender to palpation    ASA Score: 3    Patient/Chart Verification  Patient ID Verified: Verbal  ID Band Applied: No  Consents Confirmed: Procedural, To be obtained in the Pre-Procedure area  Interval H&P(within 24 hr) Complete (required for Outpatients and Surgery Admit only): To be obtained in the Pre-Procedure area  Allergies Reviewed: Yes  Anticoag/NSAID held?: NA  Currently on antibiotics?: No    Assessment:   1   Lumbar radiculopathy        Plan: repeat right L5 and S1 TFESI

## 2022-07-27 NOTE — DISCHARGE INSTRUCTIONS
Epidural Steroid Injection   WHAT YOU NEED TO KNOW:   An epidural steroid injection (ED) is a procedure to inject steroid medicine into the epidural space  The epidural space is between your spinal cord and vertebrae  Steroids reduce inflammation and fluid buildup in your spine that may be causing pain  You may be given pain medicine along with the steroids  ACTIVITY  Do not drive or operate machinery today  No strenuous activity today - bending, lifting, etc   You may resume normal activites starting tomorrow - start slowly and as tolerated  You may shower today, but no tub baths or hot tubs  You may have numbness for several hours from the local anesthetic  Please use caution and common sense, especially with weight-bearing activities  CARE OF THE INJECTION SITE  If you have soreness or pain, apply ice to the area today (20 minutes on/20 minutes off)  Starting tomorrow, you may use warm, moist heat or ice if needed  You may have an increase or change in your discomfort for 36-48 hours after your treatment  Apply ice and continue with any pain medication you have been prescribed  Notify the [facility] if you have any of the following: redness, drainage, swelling, headache, stiff neck or fever above 100°F     SPECIAL INSTRUCTIONS  Our office will contact you in approximately 7 days for a progress report  MEDICATIONS  Continue to take all routine medications  Our office may have instructed you to hold some medications  As no general anesthesia was used in today's procedure, you should not experience any side effects related to anesthesia  If you have a problem specifically related to your procedure, please call our office at [phone]  Problems not related to your procedure should be directed to your primary care physician

## 2022-08-03 ENCOUNTER — TELEPHONE (OUTPATIENT)
Dept: PAIN MEDICINE | Facility: CLINIC | Age: 52
End: 2022-08-03

## 2022-08-16 DIAGNOSIS — E10.65 TYPE 1 DIABETES MELLITUS WITH HYPERGLYCEMIA (HCC): Primary | ICD-10-CM

## 2022-08-16 RX ORDER — INSULIN ASPART 100 [IU]/ML
INJECTION, SOLUTION INTRAVENOUS; SUBCUTANEOUS
Qty: 90 ML | Refills: 1 | Status: SHIPPED | OUTPATIENT
Start: 2022-08-16 | End: 2022-08-20 | Stop reason: SDUPTHER

## 2022-08-20 DIAGNOSIS — E10.65 TYPE 1 DIABETES MELLITUS WITH HYPERGLYCEMIA (HCC): ICD-10-CM

## 2022-08-23 RX ORDER — INSULIN ASPART 100 [IU]/ML
INJECTION, SOLUTION INTRAVENOUS; SUBCUTANEOUS
Qty: 90 ML | Refills: 1 | Status: SHIPPED | OUTPATIENT
Start: 2022-08-23 | End: 2022-10-08 | Stop reason: SDUPTHER

## 2022-09-18 DIAGNOSIS — E78.2 MIXED HYPERLIPIDEMIA: ICD-10-CM

## 2022-09-18 DIAGNOSIS — I10 HYPERTENSION, UNSPECIFIED TYPE: ICD-10-CM

## 2022-09-19 RX ORDER — ATORVASTATIN CALCIUM 80 MG/1
80 TABLET, FILM COATED ORAL DAILY
Qty: 90 TABLET | Refills: 0 | Status: SHIPPED | OUTPATIENT
Start: 2022-09-19

## 2022-09-19 RX ORDER — RAMIPRIL 10 MG/1
10 CAPSULE ORAL DAILY
Qty: 90 CAPSULE | Refills: 0 | Status: SHIPPED | OUTPATIENT
Start: 2022-09-19

## 2022-10-08 DIAGNOSIS — H40.9 GLAUCOMA OF BOTH EYES, UNSPECIFIED GLAUCOMA TYPE: ICD-10-CM

## 2022-10-08 DIAGNOSIS — E10.65 TYPE 1 DIABETES MELLITUS WITH HYPERGLYCEMIA (HCC): ICD-10-CM

## 2022-10-10 RX ORDER — BLOOD-GLUCOSE SENSOR
EACH MISCELLANEOUS
Qty: 9 EACH | Refills: 0 | Status: SHIPPED | OUTPATIENT
Start: 2022-10-10

## 2022-10-11 RX ORDER — BIMATOPROST 0.01 %
1 DROPS OPHTHALMIC (EYE)
Qty: 5 ML | Refills: 0 | Status: SHIPPED | OUTPATIENT
Start: 2022-10-11

## 2022-10-11 RX ORDER — INSULIN ASPART 100 [IU]/ML
INJECTION, SOLUTION INTRAVENOUS; SUBCUTANEOUS
Qty: 90 ML | Refills: 0 | Status: SHIPPED | OUTPATIENT
Start: 2022-10-11 | End: 2022-10-16 | Stop reason: SDUPTHER

## 2022-10-16 DIAGNOSIS — E10.65 TYPE 1 DIABETES MELLITUS WITH HYPERGLYCEMIA (HCC): ICD-10-CM

## 2022-10-18 ENCOUNTER — APPOINTMENT (OUTPATIENT)
Dept: LAB | Age: 52
End: 2022-10-18
Payer: COMMERCIAL

## 2022-10-18 DIAGNOSIS — I10 BENIGN ESSENTIAL HYPERTENSION: ICD-10-CM

## 2022-10-18 DIAGNOSIS — E10.65 TYPE 1 DIABETES MELLITUS WITH HYPERGLYCEMIA (HCC): ICD-10-CM

## 2022-10-18 DIAGNOSIS — Z00.00 HEALTH CARE MAINTENANCE: ICD-10-CM

## 2022-10-18 DIAGNOSIS — E78.2 MIXED HYPERLIPIDEMIA: ICD-10-CM

## 2022-10-18 DIAGNOSIS — Z12.5 SCREENING FOR PROSTATE CANCER: ICD-10-CM

## 2022-10-18 LAB
ALBUMIN SERPL BCP-MCNC: 3.5 G/DL (ref 3.5–5)
ALP SERPL-CCNC: 81 U/L (ref 46–116)
ALT SERPL W P-5'-P-CCNC: 30 U/L (ref 12–78)
ANION GAP SERPL CALCULATED.3IONS-SCNC: 3 MMOL/L (ref 4–13)
AST SERPL W P-5'-P-CCNC: 16 U/L (ref 5–45)
BASOPHILS # BLD AUTO: 0.02 THOUSANDS/ΜL (ref 0–0.1)
BASOPHILS NFR BLD AUTO: 1 % (ref 0–1)
BILIRUB SERPL-MCNC: 0.45 MG/DL (ref 0.2–1)
BUN SERPL-MCNC: 21 MG/DL (ref 5–25)
CALCIUM SERPL-MCNC: 9.3 MG/DL (ref 8.3–10.1)
CHLORIDE SERPL-SCNC: 107 MMOL/L (ref 96–108)
CHOLEST SERPL-MCNC: 106 MG/DL
CO2 SERPL-SCNC: 29 MMOL/L (ref 21–32)
CREAT SERPL-MCNC: 1.06 MG/DL (ref 0.6–1.3)
CREAT UR-MCNC: 167 MG/DL
EOSINOPHIL # BLD AUTO: 0.08 THOUSAND/ΜL (ref 0–0.61)
EOSINOPHIL NFR BLD AUTO: 2 % (ref 0–6)
ERYTHROCYTE [DISTWIDTH] IN BLOOD BY AUTOMATED COUNT: 12.6 % (ref 11.6–15.1)
EST. AVERAGE GLUCOSE BLD GHB EST-MCNC: 143 MG/DL
GFR SERPL CREATININE-BSD FRML MDRD: 81 ML/MIN/1.73SQ M
GLUCOSE P FAST SERPL-MCNC: 132 MG/DL (ref 65–99)
HBA1C MFR BLD: 6.6 %
HCT VFR BLD AUTO: 40.5 % (ref 36.5–49.3)
HDLC SERPL-MCNC: 30 MG/DL
HGB BLD-MCNC: 13 G/DL (ref 12–17)
IMM GRANULOCYTES # BLD AUTO: 0.02 THOUSAND/UL (ref 0–0.2)
IMM GRANULOCYTES NFR BLD AUTO: 1 % (ref 0–2)
LDLC SERPL CALC-MCNC: 64 MG/DL (ref 0–100)
LYMPHOCYTES # BLD AUTO: 1.53 THOUSANDS/ΜL (ref 0.6–4.47)
LYMPHOCYTES NFR BLD AUTO: 35 % (ref 14–44)
MCH RBC QN AUTO: 28.4 PG (ref 26.8–34.3)
MCHC RBC AUTO-ENTMCNC: 32.1 G/DL (ref 31.4–37.4)
MCV RBC AUTO: 89 FL (ref 82–98)
MICROALBUMIN UR-MCNC: 15.1 MG/L (ref 0–20)
MICROALBUMIN/CREAT 24H UR: 9 MG/G CREATININE (ref 0–30)
MONOCYTES # BLD AUTO: 0.48 THOUSAND/ΜL (ref 0.17–1.22)
MONOCYTES NFR BLD AUTO: 11 % (ref 4–12)
NEUTROPHILS # BLD AUTO: 2.3 THOUSANDS/ΜL (ref 1.85–7.62)
NEUTS SEG NFR BLD AUTO: 50 % (ref 43–75)
NRBC BLD AUTO-RTO: 0 /100 WBCS
PLATELET # BLD AUTO: 254 THOUSANDS/UL (ref 149–390)
PMV BLD AUTO: 9.6 FL (ref 8.9–12.7)
POTASSIUM SERPL-SCNC: 4.5 MMOL/L (ref 3.5–5.3)
PROT SERPL-MCNC: 7.1 G/DL (ref 6.4–8.4)
PSA SERPL-MCNC: 0.3 NG/ML (ref 0–4)
RBC # BLD AUTO: 4.57 MILLION/UL (ref 3.88–5.62)
SODIUM SERPL-SCNC: 139 MMOL/L (ref 135–147)
TRIGL SERPL-MCNC: 62 MG/DL
WBC # BLD AUTO: 4.43 THOUSAND/UL (ref 4.31–10.16)

## 2022-10-18 PROCEDURE — 36415 COLL VENOUS BLD VENIPUNCTURE: CPT

## 2022-10-18 PROCEDURE — 80053 COMPREHEN METABOLIC PANEL: CPT

## 2022-10-18 PROCEDURE — 83036 HEMOGLOBIN GLYCOSYLATED A1C: CPT

## 2022-10-18 PROCEDURE — G0103 PSA SCREENING: HCPCS

## 2022-10-18 PROCEDURE — 80061 LIPID PANEL: CPT

## 2022-10-18 PROCEDURE — 85025 COMPLETE CBC W/AUTO DIFF WBC: CPT

## 2022-10-18 PROCEDURE — 82570 ASSAY OF URINE CREATININE: CPT

## 2022-10-18 PROCEDURE — 82043 UR ALBUMIN QUANTITATIVE: CPT

## 2022-10-19 ENCOUNTER — TELEPHONE (OUTPATIENT)
Dept: SLEEP CENTER | Facility: CLINIC | Age: 52
End: 2022-10-19

## 2022-10-19 NOTE — TELEPHONE ENCOUNTER
Returned patient's call  He is asking about scanning his machine  I advised per Luis E Karimi that he should scan his QR code once a week   The information will then go to Luis E Karimi

## 2022-10-20 DIAGNOSIS — E10.65 TYPE 1 DIABETES MELLITUS WITH HYPERGLYCEMIA (HCC): ICD-10-CM

## 2022-10-21 RX ORDER — BLOOD-GLUCOSE TRANSMITTER
EACH MISCELLANEOUS
Qty: 1 EACH | Refills: 0 | Status: SHIPPED | OUTPATIENT
Start: 2022-10-21

## 2022-10-25 RX ORDER — INSULIN ASPART 100 [IU]/ML
INJECTION, SOLUTION INTRAVENOUS; SUBCUTANEOUS
Qty: 90 ML | Refills: 0 | Status: SHIPPED | OUTPATIENT
Start: 2022-10-25

## 2022-11-15 DIAGNOSIS — E10.65 TYPE 1 DIABETES MELLITUS WITH HYPERGLYCEMIA (HCC): ICD-10-CM

## 2022-11-15 RX ORDER — INSULIN GLARGINE-YFGN 100 [IU]/ML
INJECTION, SOLUTION SUBCUTANEOUS
Qty: 90 ML | Refills: 0 | Status: SHIPPED | OUTPATIENT
Start: 2022-11-15

## 2022-12-09 DIAGNOSIS — E10.65 TYPE 1 DIABETES MELLITUS WITH HYPERGLYCEMIA (HCC): ICD-10-CM

## 2022-12-09 DIAGNOSIS — I10 HYPERTENSION, UNSPECIFIED TYPE: ICD-10-CM

## 2022-12-09 DIAGNOSIS — E78.2 MIXED HYPERLIPIDEMIA: ICD-10-CM

## 2022-12-09 RX ORDER — ATORVASTATIN CALCIUM 80 MG/1
80 TABLET, FILM COATED ORAL DAILY
Qty: 90 TABLET | Refills: 0 | Status: SHIPPED | OUTPATIENT
Start: 2022-12-09

## 2022-12-09 RX ORDER — BLOOD-GLUCOSE SENSOR
EACH MISCELLANEOUS
Qty: 9 EACH | Refills: 0 | Status: SHIPPED | OUTPATIENT
Start: 2022-12-09

## 2022-12-09 RX ORDER — RAMIPRIL 10 MG/1
10 CAPSULE ORAL DAILY
Qty: 90 CAPSULE | Refills: 0 | Status: SHIPPED | OUTPATIENT
Start: 2022-12-09

## 2022-12-31 DIAGNOSIS — E10.65 TYPE 1 DIABETES MELLITUS WITH HYPERGLYCEMIA (HCC): ICD-10-CM

## 2023-01-03 RX ORDER — INSULIN ASPART 100 [IU]/ML
INJECTION, SOLUTION INTRAVENOUS; SUBCUTANEOUS
Qty: 90 ML | Refills: 0 | Status: SHIPPED | OUTPATIENT
Start: 2023-01-03

## 2023-01-19 DIAGNOSIS — H40.9 GLAUCOMA OF BOTH EYES, UNSPECIFIED GLAUCOMA TYPE: ICD-10-CM

## 2023-01-19 RX ORDER — BIMATOPROST 0.01 %
1 DROPS OPHTHALMIC (EYE)
Qty: 5 ML | Refills: 0 | Status: SHIPPED | OUTPATIENT
Start: 2023-01-19

## 2023-01-25 DIAGNOSIS — E10.65 TYPE 1 DIABETES MELLITUS WITH HYPERGLYCEMIA (HCC): ICD-10-CM

## 2023-01-27 RX ORDER — BLOOD-GLUCOSE TRANSMITTER
EACH MISCELLANEOUS
Qty: 1 EACH | Refills: 0 | Status: SHIPPED | OUTPATIENT
Start: 2023-01-27

## 2023-01-31 ENCOUNTER — OFFICE VISIT (OUTPATIENT)
Dept: ENDOCRINOLOGY | Facility: CLINIC | Age: 53
End: 2023-01-31

## 2023-01-31 VITALS
BODY MASS INDEX: 41.3 KG/M2 | HEART RATE: 88 BPM | HEIGHT: 71 IN | DIASTOLIC BLOOD PRESSURE: 80 MMHG | WEIGHT: 295 LBS | SYSTOLIC BLOOD PRESSURE: 140 MMHG | OXYGEN SATURATION: 98 %

## 2023-01-31 DIAGNOSIS — E78.2 MIXED HYPERLIPIDEMIA: ICD-10-CM

## 2023-01-31 DIAGNOSIS — Z12.11 ENCOUNTER FOR SCREENING COLONOSCOPY: ICD-10-CM

## 2023-01-31 DIAGNOSIS — E04.1 THYROID NODULE: ICD-10-CM

## 2023-01-31 DIAGNOSIS — E66.01 CLASS 3 SEVERE OBESITY DUE TO EXCESS CALORIES WITH SERIOUS COMORBIDITY AND BODY MASS INDEX (BMI) OF 40.0 TO 44.9 IN ADULT (HCC): ICD-10-CM

## 2023-01-31 DIAGNOSIS — I10 BENIGN ESSENTIAL HYPERTENSION: ICD-10-CM

## 2023-01-31 DIAGNOSIS — E10.65 TYPE 1 DIABETES MELLITUS WITH HYPERGLYCEMIA (HCC): Primary | ICD-10-CM

## 2023-01-31 LAB — SL AMB POCT HEMOGLOBIN AIC: 6.9 (ref ?–6.5)

## 2023-01-31 NOTE — PROGRESS NOTES
Established Patient Progress Note      Chief Complaint   Patient presents with   • Diabetes Type 1     Dexcom printed         History of Present Illness:   Tisha Bhatti is a 50 y o  male with  HTN, HLD, thyroid nodule, and type 1 diabetes with long term use of insulin since 1999  Patient presents today for late follow-up  He was to follow-up within 3 months but canceled his September appointment  Reports complications of neuropathy and retinopathy  Denies recent illness or hospitalizations  Denies recent severe hypoglycemic or severe hyperglycemic episodes  Denies any issues with his current regimen  home glucose monitoring: are performed regularly with Dexcom G6  Component      Latest Ref Rng & Units 5/15/2021 12/29/2021           7:27 AM  2:29 PM   Hemoglobin A1C      Normal 3 8-5 6%; PreDiabetic 5 7-6 4%; Diabetic >=6 5%; Glycemic control for adults with diabetes <7 0% % 6 7 (H) 7 0 (A)   eAG, EST AVG Glucose      mg/dl 146      Component      Latest Ref Rng & Units 10/18/2022           7:56 AM   Hemoglobin A1C      Normal 3 8-5 6%; PreDiabetic 5 7-6 4%; Diabetic >=6 5%; Glycemic control for adults with diabetes <7 0% % 6 6 (H)   eAG, EST AVG Glucose      mg/dl 143     POC HgA1C 6 9%    Current regimen:   Semglee 60 units twice daily (reports taking 40-50 units twice daily)  NovoLog  Carbohydrate ratio 1:10  ISF 30    Alex Wilde   Device used Dexcom G6  Home use     Indication   Type 1 Diabetes    More than 72 hours of data was reviewed  Report to be scanned to chart  Date Range: 1/18/2023-1/23/2023    Analysis of data:   Average Glucose: 152 mg/dL  Coefficient of Variation: 41 5%  SD : 63 mg/dL  Time in Target Range: 65 7%  Time Above Range: 29 2% greater than 180 mg/dL; 6 2% greater than 250 mg/dL  Time Below Range: 5 1% less than 70 mg/dL; 1 9% less than 54 mg/dL    Interpretation of data: Patient continues to have frequent episodes of hypoglycemia secondary to insulin stacking    He does admit to inconsistent timing of Premeal bolusing  He also admits he is using high correction doses rather than following the ISF of 30  During his appointment today, blood sugar did drop to 45 mg/dL  He does carry gummy bears with him  Also given some glucose tablets  He was symptomatic of his low blood sugar with diaphoresis  Blood sugar was 92 mg/dL prior to leaving the office  Last Eye Exam: Up-to-date  Last Foot Exam: Up-to-date    For thyroid nodules, patient still has not completed the repeat ultrasound of his thyroid  For hyperlipidemia, he is taking 80 mg of atorvastatin nightly  He denies myalgias  For hypertension, he is taking 10 mg of ramipril daily  He denies headache and cough      Patient Active Problem List   Diagnosis   • Type 1 diabetes mellitus with hyperglycemia (HCC)   • Mixed hyperlipidemia   • Thyroid nodule   • Benign essential hypertension   • Erectile dysfunction of non-organic origin   • Low back pain with sciatica   • Lumbar radiculopathy      Past Medical History:   Diagnosis Date   • Diabetes mellitus ([facility] [address] )    • Insulin pump in place 07/18/2019   • AYUSH on CPAP    • 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   • Vitamin D deficiency       Past Surgical History:   Procedure Laterality Date   • NO PAST SURGERIES        Family History   Problem Relation Age of Onset   • Lung cancer Mother    • Cancer Maternal Grandfather    • Diabetes Maternal Grandfather    • Diabetes Family    • Other Family         cardiac disorder   • Hypertension Family      Social History     Tobacco Use   • Smoking status: Never   • Smokeless tobacco: Current     Types: Chew   • Tobacco comments:     used chewing tobacco for 20 years   Substance Use Topics   • Alcohol use: Not Currently     Comment: socially     No Known Allergies      Current Outpatient Medications:   •  aspirin 81 MG tablet, Take 1 tablet by mouth daily, Disp: , Rfl:   •  atorvastatin (LIPITOR) 80 mg tablet, Take 1 tablet (80 mg total) by mouth daily, Disp: 90 tablet, Rfl: 0  •  bimatoprost (Lumigan) 0 01 % ophthalmic drops, Administer 1 drop to both eyes daily at bedtime, Disp: 5 mL, Rfl: 0  •  Cholecalciferol (VITAMIN D) 2000 units CAPS, Take by mouth, Disp: , Rfl:   •  Continuous Blood Gluc  (DEXCOM G6 ) SHARON, Use daily as directed for CGM, Disp: 1 Device, Rfl: 0  •  Continuous Blood Gluc Sensor (Dexcom G6 Sensor) MISC, Use daily as directed for CGM - Change every 10 days, Disp: 9 each, Rfl: 0  •  Continuous Blood Gluc Sensor (Dexcom G6 Sensor) MISC, Use daily as directed for CGM - Change every 10 days, Disp: 9 each, Rfl: 0  •  Continuous Blood Gluc Transmit (Dexcom G6 Transmitter) MISC, Use daily as directed for CGM - Change every 3 months, Disp: 1 each, Rfl: 0  •  glucagon 1 MG injection, Inject as directed Twice daily, Disp: , Rfl:   •  glucose blood (ONE TOUCH ULTRA TEST) test strip, Test 6x daily, Disp: 600 each, Rfl: 3  •  insulin aspart (NovoLOG FlexPen) 100 UNIT/ML injection pen, Inject per sliding scale  Max daily dose 105 units  , Disp: 90 mL, Rfl: 0  •  Insulin Glargine-yfgn (Semglee, yfgn,) 100 UNIT/ML SOPN, Inject 60 units twice daily  , Disp: 90 mL, Rfl: 0  •  Insulin Pen Needle (BD Pen Needle Ashli U/F) 32G X 4 MM MISC, Use 4 per day, Disp: 100 each, Rfl: 3  •  methocarbamol (Robaxin-750) 750 mg tablet, Take 1 tablet (750 mg total) by mouth every 8 (eight) hours as needed for muscle spasms, Disp: 90 tablet, Rfl: 1  •  ONETOUCH DELICA LANCETS FINE MISC, by Does not apply route, Disp: , Rfl:   •  ramipril (ALTACE) 10 MG capsule, Take 1 capsule (10 mg total) by mouth daily, Disp: 90 capsule, Rfl: 0  •  semaglutide, 0 25 or 0 5 mg/dose, (Ozempic) 2 mg/1 5 mL injection pen, Inject 0 25 mg once weekly for 4 weeks then increase to 0 5 mg once weekly  , Disp: 4 5 mL, Rfl: 1  •  Multiple Vitamins-Minerals (MULTIVITAMINS PLUS ZINC) CAPS, Take by mouth (Patient not taking: Reported on 1/31/2023), Disp: , Rfl:   •  olopatadine (PATANOL) 0 1 % ophthalmic solution, Administer 1 drop to both eyes 2 (two) times a day (Patient not taking: Reported on 1/31/2023), Disp: 5 mL, Rfl: 2    Review of Systems   Constitutional: Positive for fatigue  Negative for activity change, appetite change and unexpected weight change  HENT: Negative for dental problem, sore throat, trouble swallowing and voice change  Eyes: Negative for visual disturbance  Respiratory: Negative for cough, chest tightness and shortness of breath  Cardiovascular: Negative for chest pain, palpitations and leg swelling  Gastrointestinal: Negative for constipation, diarrhea, nausea and vomiting  Endocrine: Negative for polydipsia, polyphagia and polyuria  Genitourinary: Negative for frequency  Musculoskeletal: Negative for arthralgias, back pain, gait problem and myalgias  Skin: Negative for wound  Allergic/Immunologic: Negative for environmental allergies and food allergies  Neurological: Positive for numbness  Negative for dizziness, weakness, light-headedness and headaches  Psychiatric/Behavioral: Negative for decreased concentration, dysphoric mood and sleep disturbance  The patient is not nervous/anxious  Physical Exam:  Body mass index is 41 14 kg/m²  /80   Pulse 88   Ht 5' 11" (1 803 m)   Wt 134 kg (295 lb)   SpO2 98%   BMI 41 14 kg/m²    Wt Readings from Last 3 Encounters:   01/31/23 134 kg (295 lb)   07/15/22 134 kg (295 lb)   07/14/22 134 kg (295 lb 9 6 oz)       Physical Exam  Vitals reviewed  Constitutional:       General: He is not in acute distress  Appearance: He is well-developed  He is obese  He is not ill-appearing  HENT:      Head: Normocephalic and atraumatic  Eyes:      Pupils: Pupils are equal, round, and reactive to light  Neck:      Thyroid: No thyromegaly  Cardiovascular:      Rate and Rhythm: Normal rate and regular rhythm  Pulses: Normal pulses        Heart sounds: Normal heart sounds  Pulmonary:      Effort: Pulmonary effort is normal       Breath sounds: Normal breath sounds  Abdominal:      General: Bowel sounds are normal  There is no distension  Palpations: Abdomen is soft  Tenderness: There is no abdominal tenderness  Musculoskeletal:      Cervical back: Normal range of motion and neck supple  Right lower leg: No edema  Left lower leg: No edema  Lymphadenopathy:      Cervical: No cervical adenopathy  Skin:     General: Skin is warm and dry  Capillary Refill: Capillary refill takes less than 2 seconds  Neurological:      Mental Status: He is alert and oriented to person, place, and time  Gait: Gait normal    Psychiatric:         Mood and Affect: Mood normal          Behavior: Behavior normal            Labs:   Lab Results   Component Value Date    HGBA1C 6 9 (A) 01/31/2023    HGBA1C 6 6 (H) 10/18/2022    HGBA1C 7 0 (A) 12/29/2021     Lab Results   Component Value Date    CREATININE 1 06 10/18/2022    CREATININE 0 96 12/31/2021    CREATININE 0 86 05/15/2021    BUN 21 10/18/2022     11/06/2015    K 4 5 10/18/2022     10/18/2022    CO2 29 10/18/2022     eGFR   Date Value Ref Range Status   10/18/2022 81 ml/min/1 73sq m Final     Lab Results   Component Value Date    CHOL 148 08/04/2015    HDL 30 (L) 10/18/2022    TRIG 62 10/18/2022     Lab Results   Component Value Date    ALT 30 10/18/2022    AST 16 10/18/2022    ALKPHOS 81 10/18/2022    BILITOT 0 61 11/06/2015     Lab Results   Component Value Date    ZAL1OUQXBEHB 2 150 08/11/2020    VHS7WUJJAKPG 1 460 07/25/2019    ZNL6CULTQQHB 1 580 04/04/2018     Lab Results   Component Value Date    FREET4 0 88 08/11/2020       Impression & Plan:    Problem List Items Addressed This Visit        Endocrine    Type 1 diabetes mellitus with hyperglycemia ([facility] [address] ) - Primary     Patient continues to struggle with both hyper/hypoglycemia   Hypoglycemia is still due to insulin stacking which we reviewed again today  Counseled on the importance of preventing hyperglycemia with appropriate insulin timing and avoiding hypoglycemia by correcting for hyperglycemia at appropriate intervals  Patient also has significant insulin resistance  Start Ozempic  He denies history of pancreatitis, medullary thyroid cancer, and MEN-2  Counseled on mechanism of action as well as potential side effects, namely nausea  Patient is to notify me should he experience any of this  Strongly encouraged him to slow down when he eats and avoid rich foods  Patient will benefit from increased glycemic control, weight loss, and cardiovascular protection  Once he starts the 0 5 mg once weekly of the Ozempic, recommend reducing Semglee to 36 units twice daily  Counseled on appropriate hypoglycemia surveillance and treatment  Patient knows to notify me with persistent hyperglycemia or hypoglycemia  I did remind him that once he is taking the 0 5 mg of Ozempic consistently, we may need to reduce both his basal and bolus insulin  Patient verbalized understanding  Follow-up in 2 months  Lab Results   Component Value Date    HGBA1C 6 9 (A) 01/31/2023            Relevant Medications    semaglutide, 0 25 or 0 5 mg/dose, (Ozempic) 2 mg/1 5 mL injection pen    Other Relevant Orders    POCT hemoglobin A1c (Completed)    Thyroid nodule     Reminded patient to complete ultrasound of thyroid  Cardiovascular and Mediastinum    Benign essential hypertension     BP today is 140/80  Consider adjusting ACE inhibitor should his BP remain above 130/80 at next visit in 2 months  Weight loss will also assist in reducing his blood pressure  Therefore, start Ozempic  Other    Mixed hyperlipidemia     LDL at goal   Continue statin          Other Visit Diagnoses     Encounter for screening colonoscopy        Relevant Orders    Ambulatory referral for colon cancer education    Class 3 severe obesity due to excess calories with serious comorbidity and body mass index (BMI) of 40 0 to 44 9 in adult [facility])        Relevant Medications    semaglutide, 0 25 or 0 5 mg/dose, (Ozempic) 2 mg/1 5 mL injection pen          Orders Placed This Encounter   Procedures   • Ambulatory referral for colon cancer education     Standing Status:   Future     Standing Expiration Date:   1/31/2024     Referral Priority:   Routine     Referral Type:   Consult - AMB     Referral Reason:   Specialty Services Required     Requested Specialty:   Gastroenterology     Number of Visits Requested:   1     Expiration Date:   1/31/2024   • POCT hemoglobin A1c       Patient Instructions   1  Start Ozempic  Inject 0 25 mg once weekly for 4 weeks then increase to 0 5 mg once weekly  2  Be very mindful of your blood sugars  Once you have started 0 5 mg once weekly, reduce Semglee to 36 units twice daily  3  Let me know if you are having a lot of low blood sugars and I will look at your download and make adjustments  Discussed with the patient and all questioned fully answered  He will call me if any problems arise  Follow-up appointment in 2 months       Counseled patient on diagnostic results, prognosis, risk and benefit of treatment options, instruction for management, importance of treatment compliance, Risk  factor reduction and impressions    LISA Bowers

## 2023-01-31 NOTE — PATIENT INSTRUCTIONS
Start Ozempic  Inject 0 25 mg once weekly for 4 weeks then increase to 0 5 mg once weekly  Be very mindful of your blood sugars  Once you have started 0 5 mg once weekly, reduce Semglee to 36 units twice daily  Let me know if you are having a lot of low blood sugars and I will look at your download and make adjustments

## 2023-02-01 NOTE — ASSESSMENT & PLAN NOTE
Patient continues to struggle with both hyper/hypoglycemia  Hypoglycemia is still due to insulin stacking which we reviewed again today  Counseled on the importance of preventing hyperglycemia with appropriate insulin timing and avoiding hypoglycemia by correcting for hyperglycemia at appropriate intervals  Patient also has significant insulin resistance  Start Ozempic  He denies history of pancreatitis, medullary thyroid cancer, and MEN-2  Counseled on mechanism of action as well as potential side effects, namely nausea  Patient is to notify me should he experience any of this  Strongly encouraged him to slow down when he eats and avoid rich foods  Patient will benefit from increased glycemic control, weight loss, and cardiovascular protection  Once he starts the 0 5 mg once weekly of the Ozempic, recommend reducing Semglee to 36 units twice daily  Counseled on appropriate hypoglycemia surveillance and treatment  Patient knows to notify me with persistent hyperglycemia or hypoglycemia  I did remind him that once he is taking the 0 5 mg of Ozempic consistently, we may need to reduce both his basal and bolus insulin  Patient verbalized understanding  Follow-up in 2 months    Lab Results   Component Value Date    HGBA1C 6 9 (A) 01/31/2023

## 2023-02-01 NOTE — ASSESSMENT & PLAN NOTE
BP today is 140/80  Consider adjusting ACE inhibitor should his BP remain above 130/80 at next visit in 2 months  Weight loss will also assist in reducing his blood pressure  Therefore, start Ozempic

## 2023-02-07 ENCOUNTER — PATIENT MESSAGE (OUTPATIENT)
Dept: ENDOCRINOLOGY | Facility: CLINIC | Age: 53
End: 2023-02-07

## 2023-02-10 NOTE — PATIENT COMMUNICATION
Yeison Joyce has been approved as of today  Approved from 02/07/2023 till 02/07/2024  Left vm for patient to inform him

## 2023-02-13 ENCOUNTER — HOSPITAL ENCOUNTER (OUTPATIENT)
Dept: RADIOLOGY | Facility: IMAGING CENTER | Age: 53
Discharge: HOME | End: 2023-02-13

## 2023-02-13 DIAGNOSIS — E10.65 TYPE 1 DIABETES MELLITUS WITH HYPERGLYCEMIA (HCC): ICD-10-CM

## 2023-02-13 DIAGNOSIS — E04.1 THYROID NODULE: ICD-10-CM

## 2023-02-13 RX ORDER — INSULIN GLARGINE-YFGN 100 [IU]/ML
INJECTION, SOLUTION SUBCUTANEOUS
Qty: 90 ML | Refills: 0 | Status: SHIPPED | OUTPATIENT
Start: 2023-02-13

## 2023-02-16 NOTE — PROGRESS NOTES
Assessment/Plan:  Chief Complaint   Patient presents with    Physical Exam     Patient Instructions   Here for recheck and also needs to get BMI lower than 30 and is working to improve his labs and will continue with screening for colon cancer as he is now going to be 50years old  Take cholesterol med and diabetes meds and HTN med as directed  Call if any problems  Patient states his endocrinologist every 3 months and monitors BP and sees eye doctor every 6 months and takes Lumigan  No problem-specific Assessment & Plan notes found for this encounter  Diagnoses and all orders for this visit:    Benign essential hypertension    Mixed hyperlipidemia    Type 1 diabetes mellitus with hyperglycemia (HCC)    Class 3 severe obesity due to excess calories with body mass index (BMI) of 40 0 to 44 9 in adult, unspecified whether serious comorbidity present ([facility] [address] )          Subjective:      Patient ID: Lizzeth Valle is a 54 y o  male  Here for recheck and does see endo and BP and glucose have been stable recently  Taking all meds as directed and trying to lose weight via diet and exercise  No cp or sob, or ha  Hx of HTN and hyperlipidemia and DM type 1  Patient is security in HS  The following portions of the patient's history were reviewed and updated as appropriate: allergies, current medications, past family history, past medical history, past social history, past surgical history and problem list     Review of Systems   Constitutional: Negative  HENT: Negative  Eyes: Negative  Respiratory: Negative  Cardiovascular: Negative  Gastrointestinal: Negative  Endocrine: Negative  Genitourinary: Negative  Musculoskeletal: Negative  Skin: Negative  Allergic/Immunologic: Negative  Neurological: Negative  Hematological: Negative  Psychiatric/Behavioral: Negative            Objective:      /78 (BP Location: Left arm, Patient Position: Sitting, Cuff Size: Adult) [facility] NEEDS A 3 D BILATERAL DIAGNOSTIC MAMMOGRAPH     PT IS DUE AND IS TRYING TO MAKE AN APPOINTMENT. Pulse 96   Temp 97 6 °F (36 4 °C) (Temporal)   Resp 16   Ht 5' 11" (1 803 m)   Wt 132 kg (291 lb 6 4 oz)   SpO2 96%   BMI 40 64 kg/m²          Physical Exam  Constitutional:       Appearance: He is well-developed  HENT:      Head: Normocephalic and atraumatic  Eyes:      Conjunctiva/sclera: Conjunctivae normal       Pupils: Pupils are equal, round, and reactive to light  Cardiovascular:      Rate and Rhythm: Normal rate and regular rhythm  Heart sounds: Normal heart sounds  Pulmonary:      Effort: Pulmonary effort is normal       Breath sounds: Normal breath sounds  Musculoskeletal:         General: Normal range of motion  Cervical back: Normal range of motion and neck supple  Skin:     General: Skin is warm and dry  Neurological:      Mental Status: He is alert and oriented to person, place, and time  Deep Tendon Reflexes: Reflexes are normal and symmetric     Psychiatric:         Behavior: Behavior normal  No

## 2023-02-23 DIAGNOSIS — E04.1 THYROID NODULE: Primary | ICD-10-CM

## 2023-03-16 DIAGNOSIS — E10.65 TYPE 1 DIABETES MELLITUS WITH HYPERGLYCEMIA (HCC): ICD-10-CM

## 2023-03-16 RX ORDER — PROCHLORPERAZINE 25 MG/1
SUPPOSITORY RECTAL
Qty: 9 EACH | Refills: 0 | Status: SHIPPED | OUTPATIENT
Start: 2023-03-16

## 2023-03-25 DIAGNOSIS — E10.65 TYPE 1 DIABETES MELLITUS WITH HYPERGLYCEMIA (HCC): ICD-10-CM

## 2023-03-27 RX ORDER — INSULIN ASPART 100 [IU]/ML
INJECTION, SOLUTION INTRAVENOUS; SUBCUTANEOUS
Qty: 90 ML | Refills: 0 | Status: SHIPPED | OUTPATIENT
Start: 2023-03-27

## 2023-04-06 NOTE — PROGRESS NOTES
Established Patient Progress Note      Chief Complaint   Patient presents with   • Diabetes Type 1     Using dexcom        Impression & Plan:    Problem List Items Addressed This Visit        Endocrine    Type 1 diabetes mellitus with hyperglycemia ([address] ) - Primary     Patient's control is improving  He is having fewer fluctuations in his blood sugars  He does continue to over-correct for both hypo and hyperglycemia  Reviewed how to avoid stacking insulin  Reduce Semglee to 70 units once daily in the evening  Aim to take at the same time every night  He is about to increase his Ozempic to 0 5 mg once weekly  I anticipate increasing again to 1 mg should he tolerate the Ozempic  Patient knows to notify me with persistent hyperglycemia or episodes of hypoglycemia  He is now linked to the office so that I can review his CGM remotely  Continue to focus on a healthy diet and regular physical activity  Complete labs prior to appointment in approximately 3 months  Lab Results   Component Value Date    HGBA1C 6 9 (A) 01/31/2023            Relevant Medications    Insulin Glargine-yfgn (Semglee, yfgn,) 100 UNIT/ML SOPN    Other Relevant Orders    Hemoglobin A1C    Comprehensive metabolic panel    Thyroid nodule     Reviewed ultrasound results with patient  Will need to repeat in February 2024  Cardiovascular and Mediastinum    Benign essential hypertension     BP is again 140/80  Currently taking 10 mg of ramipril daily  Daily, with increasing Ozempic, patient will continue to lose some weight which will assist with hypertension  If, at next appointment, BP remains above goal, can consider adding amlodipine  Orders Placed This Encounter   Procedures   • Hemoglobin A1C     Standing Status:   Future     Standing Expiration Date:   4/7/2024   • Comprehensive metabolic panel     This is a patient instruction: Patient fasting for 8 hours or longer recommended       Standing Status:   Future Standing Expiration Date:   4/7/2024       History of Present Illness:   Bandar Sam is a 48 y o  male with HTN, HLD, thyroid nodule, and type 1 diabetes with long term use of insulin since 1999  Patient presents today for late follow-up  He was to follow-up within 3 months but canceled his September appointment  Reports complications of neuropathy and retinopathy  Denies recent illness or hospitalizations  Denies recent severe hypoglycemic or severe hyperglycemic episodes  Denies any issues with his current regimen  home glucose monitoring: are performed regularly with Dexcom G6  Current regimen:   Semglee 40 units twice daily  NovoLog per sliding scale max daily dose of 105 units  Carbohydrate ratio 1:10  ISF 30  Ozempic 0 5 mg once weekly    Bandar Sam   Device used Dexcom G6  Home use     Indication   Type 1 Diabetes    More than 72 hours of data was reviewed  Report to be scanned to chart  Date Range: 3/25/2024-4/7/2023    Analysis of data:   Average Glucose: 154 mg/dL  Coefficient of Variation: 39 2%   SD : 60 mg/dL   Time in Target Range: 67%   Time Above Range: 21% 181-250 mg/dL; 8%>250 mg/dL   Time Below Range: 4%<70 mg/dL; <1% <54 mg/dL     Interpretation of data: Patient is relatively well controlled however he continues to have episodes of postprandial hyperglycemia which he overtreats leading to hypoglycemia  It also appears that he is over basalized  Last Eye Exam: UTD  Last Foot Exam: UTD    For hyperlipidemia, he is taking 80 mg of atorvastatin nightly  He denies myalgias  For hypertension, he is taking 10 mg of ramipril daily  He denies headache and cough  For thyroid nodules, patient completed an ultrasound of the thyroid in February 2023  This demonstrated stable thyroid nodules neither which met criteria for fine-needle aspiration biopsy but should be monitored with yearly ultrasound  Denies any compressive symptoms      Study Result    Narrative & Impression THYROID ULTRASOUND     INDICATION:    E04 1: Nontoxic single thyroid nodule      COMPARISON:  4/4/2017     TECHNIQUE:   Ultrasound of the thyroid was performed with a high frequency linear transducer in transverse and sagittal planes including volumetric imaging sweeps as well as traditional still imaging technique      FINDINGS:  Normal homogeneous smooth echotexture      Right lobe: 5 5 x 1 8 x 1 9 cm  Volume 9 2 mL  Left lobe:  3 6 x 1 2 x 1 6 cm  Volume 3 1 mL  Isthmus: 0 5  cm      Nodule #1  Image 37  RIGHT midgland nodule measuring 1 2 x 0 9 x 1 1 cm  May correspond to the previously seen nodule which measures 0 7 x 0 4 x 0 7 cm  COMPOSITION:  2 points, solid or almost completely solid   ECHOGENICITY:  2 points, hypoechoic  SHAPE:  0 points, wider-than-tall  MARGIN: 0 points, smooth  ECHOGENIC FOCI:  1 point, macrocalcifications  TI-RADS Classification: TR 4 (4-6 points), FNA if > 1 5 cm  Follow if > 1cm      Nodule #2  Image 40  RIGHT lower pole nodule measuring 0 9 x 1 x 0 9 cm  May correspond to the previously identified nodule that measures 0 9 x 0 8 x 0 8 cm  COMPOSITION:  2 points, solid or almost completely solid   ECHOGENICITY:  1 point, hyperechoic or isoechoic  SHAPE:  3 points, taller-than-wide  Though this may be due to measurement technique and marker placement  MARGIN: 0 points, smooth  ECHOGENIC FOCI:  0 points, none or large comet-tail artifacts  TI-RADS Classification: TR 4 (4-6 points), FNA if > 1 5 cm  Follow if > 1cm      There are additional nodules of lesser size and/or TI-RADS score  These do not necessitate additional evaluation based on ACR criteria       IMPRESSION:     No nodule meets current ACR criteria for requiring biopsy but followup ultrasound is recommended in 1 year          Patient Active Problem List   Diagnosis   • Type 1 diabetes mellitus with hyperglycemia (HCC)   • Mixed hyperlipidemia   • Thyroid nodule   • Benign essential hypertension • Erectile dysfunction of non-organic origin   • Low back pain with sciatica   • Lumbar radiculopathy      Past Medical History:   Diagnosis Date   • Diabetes mellitus ([facility] [address] )    • Insulin pump in place 07/18/2019   • AYUSH on CPAP    • 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   • Vitamin D deficiency       Past Surgical History:   Procedure Laterality Date   • NO PAST SURGERIES        Family History   Problem Relation Age of Onset   • Lung cancer Mother    • Cancer Maternal Grandfather    • Diabetes Maternal Grandfather    • Diabetes Family    • Other Family         cardiac disorder   • Hypertension Family      Social History     Tobacco Use   • Smoking status: Never   • Smokeless tobacco: Current     Types: Chew   • Tobacco comments:     used chewing tobacco for 20 years   Substance Use Topics   • Alcohol use: Not Currently     Comment: socially     No Known Allergies      Current Outpatient Medications:   •  aspirin 81 MG tablet, Take 1 tablet by mouth daily, Disp: , Rfl:   •  atorvastatin (LIPITOR) 80 mg tablet, Take 1 tablet (80 mg total) by mouth daily, Disp: 90 tablet, Rfl: 1  •  bimatoprost (Lumigan) 0 01 % ophthalmic drops, Administer 1 drop to both eyes daily at bedtime, Disp: 5 mL, Rfl: 0  •  Cholecalciferol (VITAMIN D) 2000 units CAPS, Take by mouth, Disp: , Rfl:   •  Continuous Blood Gluc  (DEXCOM G6 ) SHARON, Use daily as directed for CGM, Disp: 1 Device, Rfl: 0  •  Continuous Blood Gluc Sensor (Dexcom G6 Sensor) MISC, Use daily as directed for CGM - Change every 10 days, Disp: 9 each, Rfl: 0  •  Continuous Blood Gluc Sensor (Dexcom G6 Sensor) MISC, Use daily as directed for CGM - Change every 10 days, Disp: 9 each, Rfl: 0  •  Continuous Blood Gluc Transmit (Dexcom G6 Transmitter) MISC, Use daily as directed for CGM - Change every 3 months, Disp: 1 each, Rfl: 0  •  glucagon 1 MG injection, Inject as directed Twice daily, Disp: , Rfl:   •  glucose blood (ONE TOUCH ULTRA TEST) test strip, Test 6x daily, Disp: 600 each, Rfl: 3  •  insulin aspart (NovoLOG FlexPen) 100 UNIT/ML injection pen, Inject per sliding scale  Max daily dose 105 units  , Disp: 90 mL, Rfl: 0  •  Insulin Glargine-yfgn (Semglee, yfgn,) 100 UNIT/ML SOPN, Inject 70 units nightly , Disp: 60 mL, Rfl: 1  •  Insulin Pen Needle (BD Pen Needle Ashli U/F) 32G X 4 MM MISC, Use 4 per day, Disp: 100 each, Rfl: 3  •  methocarbamol (Robaxin-750) 750 mg tablet, Take 1 tablet (750 mg total) by mouth every 8 (eight) hours as needed for muscle spasms, Disp: 90 tablet, Rfl: 1  •  ONETOUCH DELICA LANCETS FINE MISC, by Does not apply route, Disp: , Rfl:   •  ramipril (ALTACE) 10 MG capsule, Take 1 capsule (10 mg total) by mouth daily, Disp: 90 capsule, Rfl: 1  •  semaglutide, 0 25 or 0 5 mg/dose, (Ozempic) 2 mg/1 5 mL injection pen, Inject 0 25 mg once weekly for 4 weeks then increase to 0 5 mg once weekly  , Disp: 4 5 mL, Rfl: 1  •  Multiple Vitamins-Minerals (MULTIVITAMINS PLUS ZINC) CAPS, Take by mouth (Patient not taking: Reported on 1/31/2023), Disp: , Rfl:   •  olopatadine (PATANOL) 0 1 % ophthalmic solution, Administer 1 drop to both eyes 2 (two) times a day (Patient not taking: Reported on 1/31/2023), Disp: 5 mL, Rfl: 2    Review of Systems   Constitutional: Negative for activity change, appetite change, fatigue and unexpected weight change  HENT: Negative for dental problem, sore throat, trouble swallowing and voice change  Eyes: Negative for visual disturbance  Respiratory: Negative for cough, chest tightness and shortness of breath  Cardiovascular: Negative for chest pain, palpitations and leg swelling  Gastrointestinal: Negative for constipation, diarrhea, nausea and vomiting  Endocrine: Negative for polydipsia, polyphagia and polyuria  Genitourinary: Negative for frequency  Musculoskeletal: Negative for arthralgias, back pain, gait problem and myalgias  Skin: Negative for wound     Allergic/Immunologic: "Negative for environmental allergies and food allergies  Neurological: Negative for dizziness, weakness, light-headedness, numbness and headaches  Psychiatric/Behavioral: Negative for decreased concentration, dysphoric mood and sleep disturbance  The patient is not nervous/anxious  Physical Exam:  Body mass index is 41 14 kg/m²  /80   Pulse 88   Ht 5' 11\" (1 803 m)   Wt 134 kg (295 lb)   SpO2 99%   BMI 41 14 kg/m²    Wt Readings from Last 3 Encounters:   04/07/23 134 kg (295 lb)   01/31/23 134 kg (295 lb)   07/15/22 134 kg (295 lb)       Physical Exam  Vitals reviewed  Constitutional:       General: He is not in acute distress  Appearance: He is well-developed  He is obese  He is not ill-appearing  HENT:      Head: Normocephalic and atraumatic  Eyes:      Pupils: Pupils are equal, round, and reactive to light  Neck:      Thyroid: No thyromegaly  Cardiovascular:      Rate and Rhythm: Normal rate and regular rhythm  Pulses: Normal pulses  Heart sounds: Normal heart sounds  Pulmonary:      Effort: Pulmonary effort is normal       Breath sounds: Normal breath sounds  Abdominal:      General: Bowel sounds are normal  There is no distension  Palpations: Abdomen is soft  Tenderness: There is no abdominal tenderness  Musculoskeletal:      Cervical back: Normal range of motion and neck supple  Right lower leg: No edema  Left lower leg: No edema  Lymphadenopathy:      Cervical: No cervical adenopathy  Skin:     General: Skin is warm and dry  Capillary Refill: Capillary refill takes less than 2 seconds  Neurological:      Mental Status: He is alert and oriented to person, place, and time        Gait: Gait normal    Psychiatric:         Mood and Affect: Mood normal          Behavior: Behavior normal            Labs:   Lab Results   Component Value Date    HGBA1C 6 9 (A) 01/31/2023    HGBA1C 6 6 (H) 10/18/2022    HGBA1C 7 0 (A) 12/29/2021     Lab " Results   Component Value Date    CREATININE 1 06 10/18/2022    CREATININE 0 96 12/31/2021    CREATININE 0 86 05/15/2021    BUN 21 10/18/2022     11/06/2015    K 4 5 10/18/2022     10/18/2022    CO2 29 10/18/2022     eGFR   Date Value Ref Range Status   10/18/2022 81 ml/min/1 73sq m Final     Lab Results   Component Value Date    CHOL 148 08/04/2015    HDL 30 (L) 10/18/2022    TRIG 62 10/18/2022     Lab Results   Component Value Date    ALT 30 10/18/2022    AST 16 10/18/2022    ALKPHOS 81 10/18/2022    BILITOT 0 61 11/06/2015     Lab Results   Component Value Date    PAF3YRTBMZDV 2 150 08/11/2020    PTC4EKFORQFY 1 460 07/25/2019    TZP2QUKSHWPY 1 580 04/04/2018     Lab Results   Component Value Date    FREET4 0 88 08/11/2020             Discussed with the patient and all questioned fully answered  He will call me if any problems arise  Follow-up appointment in 4 months  Counseled patient on diagnostic results, prognosis, risk and benefit of treatment options, instruction for management, importance of treatment compliance, Risk  factor reduction and impressions    There are no Patient Instructions on file for this visit      LISA Tee

## 2023-04-07 ENCOUNTER — OFFICE VISIT (OUTPATIENT)
Dept: ENDOCRINOLOGY | Facility: CLINIC | Age: 53
End: 2023-04-07

## 2023-04-07 VITALS
DIASTOLIC BLOOD PRESSURE: 80 MMHG | HEIGHT: 71 IN | OXYGEN SATURATION: 99 % | SYSTOLIC BLOOD PRESSURE: 140 MMHG | HEART RATE: 88 BPM | BODY MASS INDEX: 41.3 KG/M2 | WEIGHT: 295 LBS

## 2023-04-07 DIAGNOSIS — E04.1 THYROID NODULE: ICD-10-CM

## 2023-04-07 DIAGNOSIS — I10 BENIGN ESSENTIAL HYPERTENSION: ICD-10-CM

## 2023-04-07 DIAGNOSIS — E10.65 TYPE 1 DIABETES MELLITUS WITH HYPERGLYCEMIA (HCC): Primary | ICD-10-CM

## 2023-04-07 RX ORDER — INSULIN GLARGINE-YFGN 100 [IU]/ML
INJECTION, SOLUTION SUBCUTANEOUS
Qty: 60 ML | Refills: 1 | Status: SHIPPED | OUTPATIENT
Start: 2023-04-07

## 2023-04-07 NOTE — ASSESSMENT & PLAN NOTE
Patient's control is improving  He is having fewer fluctuations in his blood sugars  He does continue to over-correct for both hypo and hyperglycemia  Reviewed how to avoid stacking insulin  Reduce Semglee to 70 units once daily in the evening  Aim to take at the same time every night  He is about to increase his Ozempic to 0 5 mg once weekly  I anticipate increasing again to 1 mg should he tolerate the Ozempic  Patient knows to notify me with persistent hyperglycemia or episodes of hypoglycemia  He is now linked to the office so that I can review his CGM remotely  Continue to focus on a healthy diet and regular physical activity  Complete labs prior to appointment in approximately 3 months    Lab Results   Component Value Date    HGBA1C 6 9 (A) 01/31/2023

## 2023-04-07 NOTE — ASSESSMENT & PLAN NOTE
BP is again 140/80  Currently taking 10 mg of ramipril daily  Daily, with increasing Ozempic, patient will continue to lose some weight which will assist with hypertension  If, at next appointment, BP remains above goal, can consider adding amlodipine

## 2023-04-26 DIAGNOSIS — E10.65 TYPE 1 DIABETES MELLITUS WITH HYPERGLYCEMIA (HCC): ICD-10-CM

## 2023-04-26 RX ORDER — INSULIN GLARGINE-YFGN 100 [IU]/ML
INJECTION, SOLUTION SUBCUTANEOUS
Qty: 60 ML | Refills: 1 | Status: SHIPPED | OUTPATIENT
Start: 2023-04-26

## 2023-05-08 DIAGNOSIS — H40.9 GLAUCOMA OF BOTH EYES, UNSPECIFIED GLAUCOMA TYPE: ICD-10-CM

## 2023-05-08 DIAGNOSIS — E10.65 TYPE 1 DIABETES MELLITUS WITH HYPERGLYCEMIA (HCC): ICD-10-CM

## 2023-05-09 RX ORDER — PROCHLORPERAZINE 25 MG/1
SUPPOSITORY RECTAL
Qty: 1 EACH | Refills: 0 | Status: SHIPPED | OUTPATIENT
Start: 2023-05-09

## 2023-05-10 RX ORDER — BIMATOPROST 0.01 %
1 DROPS OPHTHALMIC (EYE)
Qty: 5 ML | Refills: 0 | Status: SHIPPED | OUTPATIENT
Start: 2023-05-10

## 2023-05-10 NOTE — TELEPHONE ENCOUNTER
Patient requesting refill(s) of: Lumigan     Last filled: 1/19/2023 5mL 0 refills   Last appt: 4/7/2023  Next appt: 8/9/2023  Pharmacy: [facility]

## 2023-05-17 DIAGNOSIS — E66.01 CLASS 3 SEVERE OBESITY DUE TO EXCESS CALORIES WITH SERIOUS COMORBIDITY AND BODY MASS INDEX (BMI) OF 40.0 TO 44.9 IN ADULT (HCC): ICD-10-CM

## 2023-05-17 DIAGNOSIS — H40.9 GLAUCOMA OF BOTH EYES, UNSPECIFIED GLAUCOMA TYPE: ICD-10-CM

## 2023-05-24 DIAGNOSIS — E10.65 TYPE 1 DIABETES MELLITUS WITH HYPERGLYCEMIA (HCC): Primary | ICD-10-CM

## 2023-05-24 DIAGNOSIS — E66.01 CLASS 3 SEVERE OBESITY DUE TO EXCESS CALORIES WITH SERIOUS COMORBIDITY AND BODY MASS INDEX (BMI) OF 40.0 TO 44.9 IN ADULT (HCC): ICD-10-CM

## 2023-05-25 RX ORDER — BIMATOPROST 0.1 MG/ML
1 SOLUTION/ DROPS OPHTHALMIC
Qty: 5 ML | Refills: 0 | OUTPATIENT
Start: 2023-05-25

## 2023-05-25 RX ORDER — BIMATOPROST 0.1 MG/ML
1 SOLUTION/ DROPS OPHTHALMIC
Qty: 5 ML | Refills: 1 | Status: SHIPPED | OUTPATIENT
Start: 2023-05-25

## 2023-06-12 DIAGNOSIS — E10.65 TYPE 1 DIABETES MELLITUS WITH HYPERGLYCEMIA (HCC): ICD-10-CM

## 2023-06-13 RX ORDER — PEN NEEDLE, DIABETIC 32GX 5/32"
NEEDLE, DISPOSABLE MISCELLANEOUS
Qty: 100 EACH | Refills: 3 | Status: SHIPPED | OUTPATIENT
Start: 2023-06-13

## 2023-06-13 RX ORDER — PROCHLORPERAZINE 25 MG/1
SUPPOSITORY RECTAL
Qty: 9 EACH | Refills: 0 | Status: SHIPPED | OUTPATIENT
Start: 2023-06-13

## 2023-06-14 ENCOUNTER — OFFICE VISIT (OUTPATIENT)
Dept: SLEEP CENTER | Facility: CLINIC | Age: 53
End: 2023-06-14
Payer: COMMERCIAL

## 2023-06-14 VITALS
BODY MASS INDEX: 38.98 KG/M2 | OXYGEN SATURATION: 90 % | SYSTOLIC BLOOD PRESSURE: 134 MMHG | DIASTOLIC BLOOD PRESSURE: 78 MMHG | WEIGHT: 278.4 LBS | HEART RATE: 87 BPM | HEIGHT: 71 IN

## 2023-06-14 DIAGNOSIS — E66.9 OBESITY (BMI 30-39.9): ICD-10-CM

## 2023-06-14 DIAGNOSIS — G47.33 OSA (OBSTRUCTIVE SLEEP APNEA): Primary | ICD-10-CM

## 2023-06-14 DIAGNOSIS — F51.12 INSUFFICIENT SLEEP SYNDROME: ICD-10-CM

## 2023-06-14 DIAGNOSIS — R68.2 DRY MOUTH: ICD-10-CM

## 2023-06-14 PROCEDURE — 99214 OFFICE O/P EST MOD 30 MIN: CPT | Performed by: INTERNAL MEDICINE

## 2023-06-14 NOTE — PROGRESS NOTES
Follow-Up Note - Sleep Center   Abel Marroquin  48 y o  male  PNS:[birth year]  YR:[id number]  DOS:6/14/2023    CC: I saw this patient for follow-up in clinic today for Sleep disordered breathing, Coexisting Sleep and Medical Problems  Interval changes: He is using a Homa machine    Results of prior studies: A diagnostic study in 2009 demonstrated AHI of 34 per hour, higher during stage REM  Minimum oxygen saturation was 82% and 10 3% the study spent with saturations below 90%  During the subsequent therapeutic study, sleep disordered breathing was successfully remediated with nasal CPAP at 16 cm H2O  PFSH, Problem List, Medications & Allergies were reviewed in EMR  He  has a past medical history of Diabetes mellitus ([facility] [address] ), Insulin pump in place (07/18/2019), AYUSH on CPAP, Other specified congenital malformations of skin (11/06/2013), and Vitamin D deficiency  He has a current medication list which includes the following prescription(s): aspirin, atorvastatin, lumigan, vitamin d, dexcom g6 , dexcom g6 sensor, dexcom g6 sensor, dexcom g6 transmitter, glucagon, insulin aspart, insulin glargine-yfgn, bd pen needle amilcar u/f, methocarbamol, ramipril, semaglutide (1 mg/dose), glucose blood, multivitamins plus zinc, and onetouch delica lancets fine  PHYSIOLOGICAL DATA REVIEW : Using PAP > 4 hours/night 100%  Estimated MORGAN 0 7/hour with pressure of 19cm [email or web address] percentile; patient has not been using non FDA approved devices to sanitize the machine  INTERPRETATION: Compliance is excellent; Pressure setting is:optimal; ;   SUBJECTIVE: With respect to use of PAP, Leslie Valentine  is experiencing significant adverse effects:dry mouth/throat  He derives benefit  Is satisfied with sleep and daytime function  Sleep Routine: Leslie Valentine reports getting 6-7 hrs sleep; he has no difficulty initiating or maintaining sleep   He arises spontaneously and feels refreshed  Leslie Valentine denies excessive daytime sleepiness,   He rated "[himself] at Total score: 4 /24 on the [place] Sleepiness Scale  Other issues: None reported  Habits:[ reports that he has never smoked  His smokeless tobacco use includes chew ], [ reports that he does not currently use alcohol ], [ reports no history of drug use ], Caffeine use:limited; Exercise routine: regular  ROS: Significant for some intentional weight reduction  He reports nasal congestion due to upper respiratory infection  He reported no respiratory or cardiac symptoms  [place] EXAM: /78 (BP Location: Left arm, Patient Position: Sitting, Cuff Size: Adult)   Pulse 87   Ht 5' 11\" (1 803 m)   Wt 126 kg (278 lb 6 4 oz)   SpO2 90%   BMI 38 83 kg/m²     Wt Readings from Last 3 Encounters:   06/14/23 126 kg (278 lb 6 4 oz)   04/07/23 134 kg (295 lb)   01/31/23 134 kg (295 lb)      Patient is well groomed; well appearing  CNS: Alert, orientated, clear & coherent speech  Psych: cooperative and in no distress  Mental state: Appears normal   H&N: EOMI; NC/AT: No facial pressure marks, no rashes  Skin/Extrem: col & hydration normal; no edema  Resp: Respiratory effort is normal  Physical findings otherwise essentially unchanged from previous  IMPRESSION: Problem List Items & Comorbidities Addressed this Visit    1  AYUSH (obstructive sleep apnea)  PAP DME Resupply/Reorder      2  Insufficient sleep syndrome        3  Obesity (BMI 30-39 9)        4  Dry mouth            PLAN:  1  I reviewed results of prior studies and physiologic data with the patient  2  I discussed treatment options with risks and benefits  3  Treatment with  PAP is medically necessary and Gary Woods is agreable to continue use  4  Care of equipment, methods to improve comfort using PAP and importance of compliance with therapy were discussed  5  Pressure setting: continue 19 cmH2O     6  Rx provided to replace supplies and Care coordinated with DME provider  7  Strategies to improve dry mouth were discussed   " "Specifically, adequate treatment of nasal allergies, adjusting heat and humidity settings on PAP machine, using Biotene mouthwash &/or Xylimelt  8  Discussed strategies for weight reduction  9  I also advised allowing sufficient opportunity for sleep  10  Follow-up is advised in 1 year or sooner if needed to monitor progress, compliance and to adjust therapy  Thank you for allowing me to participate in the care of this patient  Sincerely,     Authenticated electronically on 30/48/28   Board Certified Specialist     Portions of the record may have been created with voice recognition software  Occasional wrong word or \"sound a like\" substitutions may have occurred due to the inherent limitations of voice recognition software  There may also be notations and random deletions of words or characters from malfunctioning software  Read the chart carefully and recognize, using context, where substitutions/deletions have occurred      "

## 2023-06-14 NOTE — PATIENT INSTRUCTIONS
Strategies to improve dry mouth were discussed  Specifically, adequate treatment of nasal allergies, adjusting heat and humidity settings on PAP machine, using Biotene mouthwash &/or Xylimelt  Nursing Support:  When: Monday through Friday 7A-5PM except holidays  Where: Our direct line is [phone]  If you are having a true emergency please call 911  In the event that the line is busy or it is after hours please leave a voice message and we will return your call  Please speak clearly, leaving your full name, birth date, best number to reach you and the reason for your call  Medication refills: We will need the name of the medication, the dosage, the ordering provider, whether you get a 30 or 90 day refill, and the pharmacy name and address  Medications will be ordered by the provider only  Nurses cannot call in prescriptions  Please allow 7 days for medication refills  Physician requested updates: If your provider requested that you call with an update after starting medication, please be ready to provide us the medication and dosage, what time you take your medication, the time you attempt to fall asleep, time you fall asleep, when you wake up, and what time you get out of bed  Sleep Study Results: We will contact you with sleep study results and/or next steps after the physician has reviewed your testing

## 2023-06-16 ENCOUNTER — TELEPHONE (OUTPATIENT)
Dept: SLEEP CENTER | Facility: CLINIC | Age: 53
End: 2023-06-16

## 2023-06-16 LAB
DME PARACHUTE DELIVERY DATE REQUESTED: NORMAL
DME PARACHUTE ITEM DESCRIPTION: NORMAL
DME PARACHUTE ORDER STATUS: NORMAL
DME PARACHUTE SUPPLIER NAME: NORMAL
DME PARACHUTE SUPPLIER PHONE: NORMAL

## 2023-06-19 LAB

## 2023-06-22 DIAGNOSIS — E10.65 TYPE 1 DIABETES MELLITUS WITH HYPERGLYCEMIA (HCC): ICD-10-CM

## 2023-06-23 RX ORDER — INSULIN ASPART 100 [IU]/ML
INJECTION, SOLUTION INTRAVENOUS; SUBCUTANEOUS
Qty: 90 ML | Refills: 0 | Status: SHIPPED | OUTPATIENT
Start: 2023-06-23

## 2023-07-19 ENCOUNTER — TELEPHONE (OUTPATIENT)
Dept: ENDOCRINOLOGY | Facility: CLINIC | Age: 53
End: 2023-07-19

## 2023-07-19 DIAGNOSIS — E10.65 TYPE 1 DIABETES MELLITUS WITH HYPERGLYCEMIA (HCC): ICD-10-CM

## 2023-07-19 DIAGNOSIS — E66.01 CLASS 3 SEVERE OBESITY DUE TO EXCESS CALORIES WITH SERIOUS COMORBIDITY AND BODY MASS INDEX (BMI) OF 40.0 TO 44.9 IN ADULT (HCC): ICD-10-CM

## 2023-07-19 NOTE — TELEPHONE ENCOUNTER
Pt called that he has new insurance and has to use [facility].    #90 with 3 refills       ID#  [id number]  Group# [id number]  Bin# [id number]  Issuer# [id number]

## 2023-07-21 NOTE — TELEPHONE ENCOUNTER
Pt called that his medications have not been sent to [facility]. Pt reporting he is out of Ozempic.

## 2023-08-01 DIAGNOSIS — H40.9 GLAUCOMA OF BOTH EYES, UNSPECIFIED GLAUCOMA TYPE: ICD-10-CM

## 2023-08-01 DIAGNOSIS — E10.65 TYPE 1 DIABETES MELLITUS WITH HYPERGLYCEMIA (HCC): ICD-10-CM

## 2023-08-02 RX ORDER — INSULIN GLARGINE-YFGN 100 [IU]/ML
INJECTION, SOLUTION SUBCUTANEOUS
Qty: 60 ML | Refills: 0 | Status: SHIPPED | OUTPATIENT
Start: 2023-08-02

## 2023-08-02 RX ORDER — BIMATOPROST 0.1 MG/ML
1 SOLUTION/ DROPS OPHTHALMIC
Qty: 5 ML | Refills: 0 | Status: SHIPPED | OUTPATIENT
Start: 2023-08-02

## 2023-08-02 RX ORDER — INSULIN ASPART 100 [IU]/ML
INJECTION, SOLUTION INTRAVENOUS; SUBCUTANEOUS
Qty: 90 ML | Refills: 0 | Status: SHIPPED | OUTPATIENT
Start: 2023-08-02 | End: 2023-08-03 | Stop reason: CLARIF

## 2023-08-03 ENCOUNTER — TELEPHONE (OUTPATIENT)
Dept: SURGERY | Facility: CLINIC | Age: 53
End: 2023-08-03

## 2023-08-03 DIAGNOSIS — E10.65 TYPE 1 DIABETES MELLITUS WITH HYPERGLYCEMIA (HCC): Primary | ICD-10-CM

## 2023-08-03 RX ORDER — INSULIN LISPRO 100 [IU]/ML
INJECTION, SOLUTION INTRAVENOUS; SUBCUTANEOUS
Qty: 90 ML | Refills: 1 | Status: SHIPPED | OUTPATIENT
Start: 2023-08-03

## 2023-08-03 NOTE — TELEPHONE ENCOUNTER
Namita Munoz from [facility] left msg on voicemail stating Novolog flexpen is not covered. She stated a covered alternative is Humalog Kwikpen.

## 2023-08-07 NOTE — PROGRESS NOTES
Established Patient Progress Note      Chief Complaint   Patient presents with   • Diabetes Type 1     Dexcom report in chart,         Impression & Plan:    Problem List Items Addressed This Visit        Endocrine    Type 1 diabetes mellitus with hyperglycemia ([address]) - Primary     Glycemic control is improving. However, he continues to have significant variability due to overcorrection of both high blood sugars and low blood sugars. Again reviewed the importance of following and ISF correction of 30 and a carbohydrate ratio of 1 unit for every 10 g of carbohydrates. Instructed him to wait at least 3 hours in between insulin injections. Will download CGM in 2 weeks for review. For now, continue 1 mg of Ozempic once weekly. Once national shortage has eased, will increase to 2 mg once weekly. Continue with a healthy diet. Continue with regular physical activity. Counseled on appropriate way to treat hypoglycemia and hyperglycemia. Patient will complete labs prior to his follow-up appointment in 2 months. Lab Results   Component Value Date    HGBA1C 6.5 08/09/2023            Thyroid nodule     Next ultrasound due in February 2024. Cardiovascular and Mediastinum    Benign essential hypertension     BP remains elevated at 148/80. Switch to 20 mg of lisinopril daily. Check CMP prior to next appointment. Relevant Medications    lisinopril (ZESTRIL) 20 mg tablet       Other    Mixed hyperlipidemia     Continue statin. Other Visit Diagnoses     Primary hypertension        Relevant Medications    lisinopril (ZESTRIL) 20 mg tablet    Other Relevant Orders    Comprehensive metabolic panel Lab Collect          Orders Placed This Encounter   Procedures   • Comprehensive metabolic panel Lab Collect     This is a patient instruction: Patient fasting for 8 hours or longer recommended.      Standing Status:   Future     Standing Expiration Date:   8/9/2024       History of Present Illness: Thierry Wan is a 48 y.o. male with HTN, HLD, thyroid nodule, and type 1 diabetes with long term use of insulin since 1999.  Patient presents today for late follow-up. Donavan Moore was to follow-up within 3 months but canceled his September appointment.  Reports complications of neuropathy and retinopathy. Denies recent illness or hospitalizations. Denies recent severe hypoglycemic or severe hyperglycemic episodes. Denies any issues with his current regimen. home glucose monitoring: are performed regularly with Dexcom G6. Current regimen:   Semglee 60 units nightly  Humalog per sliding scale-Max Daily dose 105 units  Carbohydrate ratio 1:10  ISF 30  Ozempic 1 mg once weekly    Thierry Wan   Device used Dexcom G6  Home use     Indication   Type 1 Diabetes    More than 72 hours of data was reviewed. Report to be scanned to chart. Date Range: July 27, 2023-August 9, 2023    Analysis of data:   Average Glucose: 141 mg/dL  Coefficient of Variation: 39.1%  SD : 55 mg/dL  Time in Target Range: 75%  Time Above Range: 14% 181 to 250 mg/dL; 5% greater than 250 mg/dL  Time Below Range: 5% less than 70 mg/dL; 1% less than 54 mg/dL    Interpretation of data: Patient's control is improved since his last appointment on 4/7/2023. At that time, he was only 67% in target range. However, he continues to have significant variability in his blood sugars. Hyperglycemia is occurring most commonly between 5 AM and 8 AM.    On his best day, which was yesterday, he was 90% of the time in range with 8% 181 to 50 mg/dL and 2% less than 70 mg/dL    Diet, schedule, and physical activity are highly variable. He continues to overcorrect for hypoglycemia by stacking his insulin as well as over correcting for hypoglycemia by overeating. He has also not been using appropriate correction scale but rather administering 8 to 10 units of fast acting insulin anytime his blood sugar is over 200 for an hour.   This inevitably leads to hypoglycemia. Last Eye Exam: Up-to-date  Last Foot Exam: Up-to-date    For hypertension, he is taking 10 mg of ramipril daily. He denies headache and cough. For hyperlipidemia, he is taking 80 mg of atorvastatin nightly. He denies myalgias.     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   • Vitamin D deficiency       Past Surgical History:   Procedure Laterality Date   • NO PAST SURGERIES        Family History   Problem Relation Age of Onset   • Lung cancer Mother    • Cancer Maternal Grandfather    • Diabetes Maternal Grandfather    • Diabetes Family    • Other Family         cardiac disorder   • Hypertension Family      Social History     Tobacco Use   • Smoking status: Never   • Smokeless tobacco: Current     Types: Chew   • Tobacco comments:     Grandfather and mother   Substance Use Topics   • Alcohol use: Not Currently     Comment: socially     No Known Allergies      Current Outpatient Medications:   •  aspirin 81 MG tablet, Take 1 tablet by mouth daily, Disp: , Rfl:   •  atorvastatin (LIPITOR) 80 mg tablet, Take 1 tablet (80 mg total) by mouth daily, Disp: 90 tablet, Rfl: 1  •  bimatoprost (Lumigan) 0.01 % ophthalmic drops, Administer 1 drop to both eyes daily at bedtime, Disp: 5 mL, Rfl: 0  •  Cholecalciferol (VITAMIN D) 2000 units CAPS, Take by mouth, Disp: , Rfl:   •  Continuous Blood Gluc  (DEXCOM G6 ) SHARON, Use daily as directed for CGM, Disp: 1 Device, Rfl: 0  •  Continuous Blood Gluc Sensor (Dexcom G6 Sensor) MISC, Use daily as directed for CGM - Change every 10 days, Disp: 9 each, Rfl: 0  •  Continuous Blood Gluc Sensor (Dexcom G6 Sensor) MISC, Use daily as directed for CGM - Change every 10 days, Disp: 9 each, Rfl: 0  •  Continuous Blood Gluc Transmit (Dexcom G6 Transmitter) MISC, Use daily as directed for CGM - Change every 3 months, Disp: 1 each, Rfl: 0  •  glucagon 1 MG injection, Inject as directed Twice daily, Disp: , Rfl:   •  glucose blood (ONE TOUCH ULTRA TEST) test strip, Test 6x daily, Disp: 600 each, Rfl: 3  •  Insulin Glargine-yfgn (Semglee, yfgn,) 100 UNIT/ML SOPN, Inject 60 units nightly., Disp: 60 mL, Rfl: 0  •  insulin lispro (HumaLOG KwikPen) 100 units/mL injection pen, Inject 4x daily per sliding scale. Max daily dose 105 units. , Disp: 90 mL, Rfl: 1  •  Insulin Pen Needle (BD Pen Needle Ashli U/F) 32G X 4 MM MISC, Use 4 per day, Disp: 100 each, Rfl: 3  •  lisinopril (ZESTRIL) 20 mg tablet, Take 1 tablet (20 mg total) by mouth daily, Disp: 90 tablet, Rfl: 1  •  methocarbamol (Robaxin-750) 750 mg tablet, Take 1 tablet (750 mg total) by mouth every 8 (eight) hours as needed for muscle spasms, Disp: 90 tablet, Rfl: 1  •  Multiple Vitamins-Minerals (MULTIVITAMINS PLUS ZINC) CAPS, Take by mouth, Disp: , Rfl:   •  ONETOUCH DELICA LANCETS FINE MISC, Use, Disp: , Rfl:   •  semaglutide, 1 mg/dose, (Ozempic, 1 MG/DOSE,) 4 mg/3 mL injection pen, Inject 0.75 mL (1 mg total) under the skin every 7 days, Disp: 9 mL, Rfl: 0    Review of Systems   Constitutional: Negative for activity change, appetite change, fatigue and unexpected weight change. HENT: Negative for dental problem, sore throat, trouble swallowing and voice change. Eyes: Negative for visual disturbance. Respiratory: Negative for cough, chest tightness and shortness of breath. Cardiovascular: Negative for chest pain, palpitations and leg swelling. Gastrointestinal: Negative for constipation, diarrhea, nausea and vomiting. Endocrine: Negative for polydipsia, polyphagia and polyuria.    Genitourinary: Negative for frequency. Musculoskeletal: Negative for arthralgias, back pain, gait problem and myalgias. Skin: Negative for wound. Allergic/Immunologic: Negative for environmental allergies and food allergies. Neurological: Positive for numbness. Negative for dizziness, weakness, light-headedness and headaches. Psychiatric/Behavioral: Negative for decreased concentration, dysphoric mood and sleep disturbance. The patient is not nervous/anxious. Physical Exam:  Body mass index is 38.77 kg/m². /80   Pulse 89   Temp 98.4 °F (36.9 °C)   Resp 18   Ht 5' 11" (1.803 m)   Wt 126 kg (278 lb)   SpO2 98%   BMI 38.77 kg/m²    Wt Readings from Last 3 Encounters:   08/09/23 126 kg (278 lb)   06/14/23 126 kg (278 lb 6.4 oz)   04/07/23 134 kg (295 lb)       Physical Exam  Vitals reviewed. Constitutional:       General: He is not in acute distress. Appearance: He is well-developed. He is obese. He is not ill-appearing. HENT:      Head: Normocephalic and atraumatic. Eyes:      Pupils: Pupils are equal, round, and reactive to light. Neck:      Thyroid: No thyromegaly. Cardiovascular:      Rate and Rhythm: Normal rate and regular rhythm. Pulses: Normal pulses. Heart sounds: Normal heart sounds. Pulmonary:      Effort: Pulmonary effort is normal.      Breath sounds: Normal breath sounds. Abdominal:      General: Bowel sounds are normal. There is no distension. Palpations: Abdomen is soft. Tenderness: There is no abdominal tenderness. Musculoskeletal:      Cervical back: Normal range of motion and neck supple. Right lower leg: No edema. Left lower leg: No edema. Lymphadenopathy:      Cervical: No cervical adenopathy. Skin:     General: Skin is warm and dry. Capillary Refill: Capillary refill takes less than 2 seconds. Neurological:      Mental Status: He is alert and oriented to person, place, and time.       Gait: Gait normal.   Psychiatric: Mood and Affect: Mood normal.         Behavior: Behavior normal.           Labs:   Lab Results   Component Value Date    HGBA1C 6.5 08/09/2023    HGBA1C 6.9 (A) 01/31/2023    HGBA1C 6.6 (H) 10/18/2022     Lab Results   Component Value Date    CREATININE 1.06 10/18/2022    CREATININE 0.96 12/31/2021    CREATININE 0.86 05/15/2021    BUN 21 10/18/2022     11/06/2015    K 4.5 10/18/2022     10/18/2022    CO2 29 10/18/2022     eGFR   Date Value Ref Range Status   10/18/2022 81 ml/min/1.73sq m Final     Lab Results   Component Value Date    CHOL 148 08/04/2015    HDL 30 (L) 10/18/2022    TRIG 62 10/18/2022     Lab Results   Component Value Date    ALT 30 10/18/2022    AST 16 10/18/2022    ALKPHOS 81 10/18/2022    BILITOT 0.61 11/06/2015     Lab Results   Component Value Date    HET0VALKASSE 2.150 08/11/2020    KLJ7NWTGSYBQ 1.460 07/25/2019    ZLI9LQHOMWCZ 1.580 04/04/2018     Lab Results   Component Value Date    FREET4 0.88 08/11/2020             Discussed with the patient and all questioned fully answered. He will call me if any problems arise. Follow-up appointment in 2 months. Counseled patient on diagnostic results, prognosis, risk and benefit of treatment options, instruction for management, importance of treatment compliance, Risk  factor reduction and impressions    Patient Instructions   1. Use correction factor of 1 units for every 30 mg/dL over 120. Do not correct within 3 hour window of carbohydrate bolusing. Carbohydrate ratio is 1 units of insulin for every 10 grams of carbohydrate. 2. We will eventually increase you to 2 mg of Ozempic.        [address]

## 2023-08-09 ENCOUNTER — OFFICE VISIT (OUTPATIENT)
Dept: ENDOCRINOLOGY | Facility: CLINIC | Age: 53
End: 2023-08-09
Payer: COMMERCIAL

## 2023-08-09 VITALS
HEART RATE: 89 BPM | OXYGEN SATURATION: 98 % | HEIGHT: 71 IN | TEMPERATURE: 98.4 F | DIASTOLIC BLOOD PRESSURE: 80 MMHG | RESPIRATION RATE: 18 BRPM | SYSTOLIC BLOOD PRESSURE: 140 MMHG | BODY MASS INDEX: 38.92 KG/M2 | WEIGHT: 278 LBS

## 2023-08-09 DIAGNOSIS — E04.1 THYROID NODULE: ICD-10-CM

## 2023-08-09 DIAGNOSIS — E10.65 TYPE 1 DIABETES MELLITUS WITH HYPERGLYCEMIA (HCC): Primary | ICD-10-CM

## 2023-08-09 DIAGNOSIS — I10 PRIMARY HYPERTENSION: ICD-10-CM

## 2023-08-09 DIAGNOSIS — I10 BENIGN ESSENTIAL HYPERTENSION: ICD-10-CM

## 2023-08-09 DIAGNOSIS — E78.2 MIXED HYPERLIPIDEMIA: ICD-10-CM

## 2023-08-09 LAB — SL AMB POCT HEMOGLOBIN AIC: 6.5 (ref ?–6.5)

## 2023-08-09 PROCEDURE — 99214 OFFICE O/P EST MOD 30 MIN: CPT | Performed by: NURSE PRACTITIONER

## 2023-08-09 PROCEDURE — 83036 HEMOGLOBIN GLYCOSYLATED A1C: CPT

## 2023-08-09 PROCEDURE — 92250 FUNDUS PHOTOGRAPHY W/I&R: CPT | Performed by: NURSE PRACTITIONER

## 2023-08-09 RX ORDER — LISINOPRIL 20 MG/1
20 TABLET ORAL DAILY
Qty: 90 TABLET | Refills: 1 | Status: SHIPPED | OUTPATIENT
Start: 2023-08-09

## 2023-08-09 NOTE — ASSESSMENT & PLAN NOTE
BP remains elevated at 148/80. Switch to 20 mg of lisinopril daily. Check CMP prior to next appointment.

## 2023-08-09 NOTE — ASSESSMENT & PLAN NOTE
Glycemic control is improving. However, he continues to have significant variability due to overcorrection of both high blood sugars and low blood sugars. Again reviewed the importance of following and ISF correction of 30 and a carbohydrate ratio of 1 unit for every 10 g of carbohydrates. Instructed him to wait at least 3 hours in between insulin injections. Will download CGM in 2 weeks for review. For now, continue 1 mg of Ozempic once weekly. Once national shortage has eased, will increase to 2 mg once weekly. Continue with a healthy diet. Continue with regular physical activity. Counseled on appropriate way to treat hypoglycemia and hyperglycemia. Patient will complete labs prior to his follow-up appointment in 2 months.   Lab Results   Component Value Date    HGBA1C 6.5 08/09/2023

## 2023-08-09 NOTE — PATIENT INSTRUCTIONS
Use correction factor of 1 units for every 30 mg/dL over 120. Do not correct within 3 hour window of carbohydrate bolusing. Carbohydrate ratio is 1 units of insulin for every 10 grams of carbohydrate. We will eventually increase you to 2 mg of Ozempic.

## 2023-08-10 LAB
LEFT EYE DIABETIC RETINOPATHY: ABNORMAL
LEFT EYE IMAGE QUALITY: ABNORMAL
LEFT EYE MACULAR EDEMA: ABNORMAL
LEFT EYE OTHER RETINOPATHY: ABNORMAL
RIGHT EYE DIABETIC RETINOPATHY: ABNORMAL
RIGHT EYE IMAGE QUALITY: ABNORMAL
RIGHT EYE MACULAR EDEMA: ABNORMAL
RIGHT EYE OTHER RETINOPATHY: ABNORMAL
SEVERITY (EYE EXAM): ABNORMAL

## 2023-08-22 DIAGNOSIS — E10.65 TYPE 1 DIABETES MELLITUS WITH HYPERGLYCEMIA (HCC): ICD-10-CM

## 2023-08-23 RX ORDER — PROCHLORPERAZINE 25 MG/1
SUPPOSITORY RECTAL
Qty: 9 EACH | Refills: 1 | Status: SHIPPED | OUTPATIENT
Start: 2023-08-23

## 2023-08-23 RX ORDER — PROCHLORPERAZINE 25 MG/1
SUPPOSITORY RECTAL
Qty: 1 EACH | Refills: 0 | Status: SHIPPED | OUTPATIENT
Start: 2023-08-23

## 2023-10-10 ENCOUNTER — TELEPHONE (OUTPATIENT)
Dept: BARIATRICS | Facility: CLINIC | Age: 53
End: 2023-10-10

## 2023-10-19 DIAGNOSIS — E10.65 TYPE 1 DIABETES MELLITUS WITH HYPERGLYCEMIA (HCC): ICD-10-CM

## 2023-10-20 RX ORDER — INSULIN GLARGINE-YFGN 100 [IU]/ML
INJECTION, SOLUTION SUBCUTANEOUS
Qty: 60 ML | Refills: 3 | Status: SHIPPED | OUTPATIENT
Start: 2023-10-20

## 2023-10-31 ENCOUNTER — PATIENT MESSAGE (OUTPATIENT)
Dept: PAIN MEDICINE | Facility: CLINIC | Age: 53
End: 2023-10-31

## 2023-10-31 NOTE — PATIENT COMMUNICATION
As long as the patient's pain has not changed in any way, okay to schedule the patient for repeat right L5 and S1 TFESI

## 2023-11-01 ENCOUNTER — TELEPHONE (OUTPATIENT)
Dept: RADIOLOGY | Facility: CLINIC | Age: 53
End: 2023-11-01

## 2023-11-01 DIAGNOSIS — M54.16 LUMBAR RADICULOPATHY: Primary | ICD-10-CM

## 2023-11-01 NOTE — TELEPHONE ENCOUNTER
Called patient back- he stated his pain is the same as before- not as bad yet but wanted to call before he got to the point.   Patient scheduled for a repeat right L5 and S1 TFESI-  Reviewed all instructions by phone upon scheduling mailed copy to home

## 2023-11-01 NOTE — TELEPHONE ENCOUNTER
Regarding: Shots  Contact: [phone]  ----- Message from Dao Hernandez, DO sent at 10/31/2023  4:20 PM EDT -----       ----- Message from Chloe Prior to Dao Hernandez, DO sent at 10/31/2023  7:59 AM -----   My back is starting to bother me again. Pain in back and going down my right leg. Was wondering if i can get shots again.      Thanks  Mikey Hunter

## 2023-11-03 ENCOUNTER — PATIENT MESSAGE (OUTPATIENT)
Dept: PAIN MEDICINE | Facility: CLINIC | Age: 53
End: 2023-11-03

## 2023-11-17 DIAGNOSIS — E10.65 TYPE 1 DIABETES MELLITUS WITH HYPERGLYCEMIA (HCC): ICD-10-CM

## 2023-11-17 RX ORDER — PROCHLORPERAZINE 25 MG/1
SUPPOSITORY RECTAL
Qty: 9 EACH | Refills: 0 | Status: SHIPPED | OUTPATIENT
Start: 2023-11-17

## 2023-11-17 RX ORDER — PROCHLORPERAZINE 25 MG/1
SUPPOSITORY RECTAL
Qty: 1 EACH | Refills: 0 | Status: SHIPPED | OUTPATIENT
Start: 2023-11-17

## 2023-11-20 ENCOUNTER — HOSPITAL ENCOUNTER (OUTPATIENT)
Dept: RADIOLOGY | Facility: CLINIC | Age: 53
Discharge: HOME | End: 2023-11-20
Payer: COMMERCIAL

## 2023-11-20 VITALS
SYSTOLIC BLOOD PRESSURE: 144 MMHG | RESPIRATION RATE: 20 BRPM | OXYGEN SATURATION: 99 % | TEMPERATURE: 98.1 F | HEART RATE: 88 BPM | DIASTOLIC BLOOD PRESSURE: 73 MMHG

## 2023-11-20 DIAGNOSIS — M54.16 LUMBAR RADICULOPATHY: ICD-10-CM

## 2023-11-20 PROCEDURE — 64484 NJX AA&/STRD TFRM EPI L/S EA: CPT | Performed by: ANESTHESIOLOGY

## 2023-11-20 PROCEDURE — 64483 NJX AA&/STRD TFRM EPI L/S 1: CPT | Performed by: ANESTHESIOLOGY

## 2023-11-20 RX ORDER — PAPAVERINE HCL 150 MG
20 CAPSULE, EXTENDED RELEASE ORAL ONCE
Status: COMPLETED | OUTPATIENT
Start: 2023-11-20 | End: 2023-11-20

## 2023-11-20 RX ADMIN — DEXAMETHASONE SODIUM PHOSPHATE 15 MG: 10 INJECTION, SOLUTION INTRAMUSCULAR; INTRAVENOUS at 15:57

## 2023-11-20 RX ADMIN — LIDOCAINE HYDROCHLORIDE 2 ML: 20 INJECTION, SOLUTION EPIDURAL; INFILTRATION; INTRACAUDAL; PERINEURAL at 15:57

## 2023-11-20 RX ADMIN — IOHEXOL 1 ML: 300 INJECTION, SOLUTION INTRAVENOUS at 15:57

## 2023-11-20 NOTE — H&P
History of Present Illness: The patient is a 48 y.o. male who presents with complaints of low back and leg pain. 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    Vitamin D deficiency        Past Surgical History:   Procedure Laterality Date    NO PAST SURGERIES           Current Outpatient Medications:     aspirin 81 MG tablet, Take 1 tablet by mouth daily, Disp: , Rfl:     atorvastatin (LIPITOR) 80 mg tablet, Take 1 tablet (80 mg total) by mouth daily, Disp: 90 tablet, Rfl: 3    bimatoprost (Lumigan) 0.01 % ophthalmic drops, Administer 1 drop to both eyes daily at bedtime, Disp: 5 mL, Rfl: 0    Cholecalciferol (VITAMIN D) 2000 units CAPS, Take by mouth, Disp: , Rfl:     Continuous Blood Gluc  (DEXCOM G6 ) SHARON, Use daily as directed for CGM, Disp: 1 Device, Rfl: 0    Continuous Blood Gluc Sensor (Dexcom G6 Sensor) MISC, Use daily as directed for CGM - Change every 10 days, Disp: 9 each, Rfl: 0    Continuous Blood Gluc Sensor (Dexcom G6 Sensor) MISC, Use daily as directed for CGM - Change every 10 days, Disp: 9 each, Rfl: 0    Continuous Blood Gluc Transmit (Dexcom G6 Transmitter) MISC, Use daily as directed for CGM - Change every 3 months, Disp: 1 each, Rfl: 0    glucagon 1 MG injection, Inject as directed Twice daily, Disp: , Rfl:     glucose blood (ONE TOUCH ULTRA TEST) test strip, Test 6x daily, Disp: 600 each, Rfl: 3    insulin lispro (HumaLOG KwikPen) 100 units/mL injection pen, Inject 4x daily per sliding scale. Max daily dose 105 units. , Disp: 90 mL, Rfl: 1    Insulin Pen Needle (BD Pen Needle Ashli U/F) 32G X 4 MM MISC, Use 4 per day, Disp: 100 each, Rfl: 3    lisinopril (ZESTRIL) 20 mg tablet, Take 1 tablet (20 mg total) by mouth daily, Disp: 90 tablet, Rfl: 1    methocarbamol (Robaxin-750) 750 mg tablet, Take 1 tablet (750 mg total) by mouth every 8 (eight) hours as needed for muscle spasms, Disp: 90 tablet, Rfl: 1    Multiple Vitamins-Minerals (MULTIVITAMINS PLUS ZINC) CAPS, Take by mouth, Disp: , Rfl:     ONETOUCH DELICA LANCETS FINE MISC, Use, Disp: , Rfl:     Ozempic, 1 MG/DOSE, 4 MG/3ML injection pen, INJECT 1 MG UNDER THE SKIN EVERY 7 DAYS, Disp: 9 mL, Rfl: 0    Semglee, yfgn, 100 UNIT/ML SOPN, INJECT 60 UNITS NIGHTLY (DOSE ADJUSTMENT), Disp: 60 mL, Rfl: 3    No Known Allergies    Physical Exam:   Vitals:    11/20/23 1535   BP: 120/70   Pulse: 90   Resp: 18   Temp: 98.1 °F (36.7 °C)   SpO2: 97%     General: Awake, Alert, Oriented x 3, Mood and affect appropriate  Respiratory: Respirations even and unlabored  Cardiovascular: Peripheral pulses intact; no edema  Musculoskeletal Exam: Right lumbar paraspinals tender to palpation    ASA Score: 3         Assessment: Lumbar radiculopathy    Plan: right L5 and S1 TFESI

## 2023-11-20 NOTE — DISCHARGE INSTRUCTIONS
Epidural Steroid Injection   WHAT YOU NEED TO KNOW:   An epidural steroid injection (ED) is a procedure to inject steroid medicine into the epidural space. The epidural space is between your spinal cord and vertebrae. Steroids reduce inflammation and fluid buildup in your spine that may be causing pain. You may be given pain medicine along with the steroids. ACTIVITY  Do not drive or operate machinery today. No strenuous activity today - bending, lifting, etc.  You may resume normal activites starting tomorrow - start slowly and as tolerated. You may shower today, but no tub baths or hot tubs. You may have numbness for several hours from the local anesthetic. Please use caution and common sense, especially with weight-bearing activities. CARE OF THE INJECTION SITE  If you have soreness or pain, apply ice to the area today (20 minutes on/20 minutes off). Starting tomorrow, you may use warm, moist heat or ice if needed. You may have an increase or change in your discomfort for 36-48 hours after your treatment. Apply ice and continue with any pain medication you have been prescribed. Notify the [facility] if you have any of the following: redness, drainage, swelling, headache, stiff neck or fever above 100°F.    SPECIAL INSTRUCTIONS  Our office will contact you in approximately 7 days for a progress report. MEDICATIONS  Continue to take all routine medications. Our office may have instructed you to hold some medications. As no general anesthesia was used in today's procedure, you should not experience any side effects related to anesthesia. If you are diabetic, the steroids used in today's injection may temporarily increase your blood sugar levels after the first few days after your injection. Please keep a close eye on your sugars and alert the doctor who manages your diabetes if your sugars are significantly high from your baseline or you are symptomatic.      If you have a problem specifically related to your procedure, please call our office at [phone]. Problems not related to your procedure should be directed to your primary care physician.

## 2023-11-27 ENCOUNTER — TELEPHONE (OUTPATIENT)
Dept: PAIN MEDICINE | Facility: CLINIC | Age: 53
End: 2023-11-27

## 2024-01-12 DIAGNOSIS — E10.65 TYPE 1 DIABETES MELLITUS WITH HYPERGLYCEMIA (HCC): ICD-10-CM

## 2024-01-12 DIAGNOSIS — E66.01 CLASS 3 SEVERE OBESITY DUE TO EXCESS CALORIES WITH SERIOUS COMORBIDITY AND BODY MASS INDEX (BMI) OF 40.0 TO 44.9 IN ADULT (HCC): ICD-10-CM

## 2024-01-12 RX ORDER — SEMAGLUTIDE 1.34 MG/ML
INJECTION, SOLUTION SUBCUTANEOUS
Qty: 9 ML | Refills: 3 | Status: SHIPPED | OUTPATIENT
Start: 2024-01-12

## 2024-01-15 DIAGNOSIS — I10 PRIMARY HYPERTENSION: ICD-10-CM

## 2024-01-15 RX ORDER — LISINOPRIL 20 MG/1
20 TABLET ORAL DAILY
Qty: 90 TABLET | Refills: 0 | Status: SHIPPED | OUTPATIENT
Start: 2024-01-15

## 2024-01-31 DIAGNOSIS — E10.65 TYPE 1 DIABETES MELLITUS WITH HYPERGLYCEMIA (HCC): ICD-10-CM

## 2024-02-01 DIAGNOSIS — E10.65 TYPE 1 DIABETES MELLITUS WITH HYPERGLYCEMIA (HCC): ICD-10-CM

## 2024-02-01 RX ORDER — PROCHLORPERAZINE 25 MG/1
SUPPOSITORY RECTAL
Qty: 1 EACH | Refills: 0 | Status: SHIPPED | OUTPATIENT
Start: 2024-02-01

## 2024-02-01 RX ORDER — INSULIN LISPRO 100 [IU]/ML
INJECTION, SOLUTION INTRAVENOUS; SUBCUTANEOUS
Qty: 90 ML | Refills: 1 | Status: SHIPPED | OUTPATIENT
Start: 2024-02-01

## 2024-02-02 RX ORDER — PROCHLORPERAZINE 25 MG/1
SUPPOSITORY RECTAL
Qty: 1 EACH | Refills: 0 | Status: SHIPPED | OUTPATIENT
Start: 2024-02-02

## 2024-02-02 RX ORDER — PROCHLORPERAZINE 25 MG/1
SUPPOSITORY RECTAL
Qty: 1 EACH | Refills: 3 | Status: SHIPPED | OUTPATIENT
Start: 2024-02-02

## 2024-02-23 ENCOUNTER — HOSPITAL ENCOUNTER (OUTPATIENT)
Dept: RADIOLOGY | Facility: IMAGING CENTER | Age: 54
End: 2024-02-23
Payer: COMMERCIAL

## 2024-02-23 DIAGNOSIS — E04.1 THYROID NODULE: ICD-10-CM

## 2024-02-23 PROCEDURE — 76536 US EXAM OF HEAD AND NECK: CPT

## 2024-03-04 DIAGNOSIS — E04.1 THYROID NODULE: Primary | ICD-10-CM

## 2024-04-15 DIAGNOSIS — I10 PRIMARY HYPERTENSION: ICD-10-CM

## 2024-04-15 RX ORDER — LISINOPRIL 20 MG/1
20 TABLET ORAL DAILY
Qty: 90 TABLET | Refills: 1 | Status: SHIPPED | OUTPATIENT
Start: 2024-04-15

## 2024-05-01 DIAGNOSIS — E10.65 TYPE 1 DIABETES MELLITUS WITH HYPERGLYCEMIA (HCC): ICD-10-CM

## 2024-05-01 RX ORDER — PROCHLORPERAZINE 25 MG/1
SUPPOSITORY RECTAL
Qty: 9 EACH | Refills: 1 | Status: SHIPPED | OUTPATIENT
Start: 2024-05-01

## 2024-05-01 NOTE — TELEPHONE ENCOUNTER
Fax from [facility] for refill of patient's Dexcom sensors to be sent to them, medication pending.

## 2024-05-04 ENCOUNTER — PATIENT MESSAGE (OUTPATIENT)
Dept: ENDOCRINOLOGY | Facility: CLINIC | Age: 54
End: 2024-05-04

## 2024-05-04 DIAGNOSIS — E10.65 TYPE 1 DIABETES MELLITUS WITH HYPERGLYCEMIA (HCC): ICD-10-CM

## 2024-05-07 DIAGNOSIS — E13.9 TYPE 1.5 DIABETES, MANAGED AS TYPE 2 (HCC): Primary | ICD-10-CM

## 2024-05-08 ENCOUNTER — TELEPHONE (OUTPATIENT)
Dept: ENDOCRINOLOGY | Facility: CLINIC | Age: 54
End: 2024-05-08

## 2024-05-13 NOTE — TELEPHONE ENCOUNTER
PA for ozempic 2mg     Submitted via    []CMM-KEY    [x][employer]-Case ID # [id number]   []Faxed to plan   []Other website    []Phone call Case ID #      Office notes sent, clinical questions answered. Awaiting determination    Turnaround time for your insurance to make a decision on your Prior Authorization can take 7-21 business days.

## 2024-05-13 NOTE — TELEPHONE ENCOUNTER
PA for ozempic Approved     Date(s) approved May 13, 2024 to May 13, 2025     Case #    Patient advised by          [] MyChart Message  [x] Phone call   [x]LMOM  []L/M to call office as no active Communication consent on file  []Unable to leave detailed message as VM not approved on Communication consent       Pharmacy advised by    []Fax  [x]Phone call    Approval letter scanned into Media Yes

## 2024-05-24 DIAGNOSIS — E13.9 TYPE 1.5 DIABETES, MANAGED AS TYPE 2 (HCC): ICD-10-CM

## 2024-05-30 ENCOUNTER — TELEPHONE (OUTPATIENT)
Dept: SURGERY | Facility: CLINIC | Age: 54
End: 2024-05-30

## 2024-05-30 NOTE — TELEPHONE ENCOUNTER
I have prescribed these to the patient in the past as a courtesy. He needs to follow up with an eye doctor now. Thank you.

## 2024-05-30 NOTE — TELEPHONE ENCOUNTER
Fax received from pharmacy to change Lumigan eye drops sent in for patient to something else due to cost. They are recommending Bimatoprost 0.03%, Latanoprost 0.005%, Tafluprost 0.0015%, or Travoprost 0.004% to be sent to [facility].

## 2024-06-07 RX ORDER — INSULIN LISPRO 100 [IU]/ML
INJECTION, SOLUTION INTRAVENOUS; SUBCUTANEOUS
Qty: 90 ML | Refills: 1 | Status: SHIPPED | OUTPATIENT
Start: 2024-06-07

## 2024-06-11 ENCOUNTER — TELEPHONE (OUTPATIENT)
Age: 54
End: 2024-06-11

## 2024-06-11 DIAGNOSIS — E10.65 TYPE 1 DIABETES MELLITUS WITH HYPERGLYCEMIA (HCC): ICD-10-CM

## 2024-06-11 NOTE — TELEPHONE ENCOUNTER
Pt is calling stating that the pharmacy is telling him that Humalog rx needs be resent to the [facility] under Novolog Insulin with the same instructions. Humalog is not covered under the pt's plan. Please review and sign rx asap pt leaves for vacation next week.

## 2024-06-12 RX ORDER — INSULIN LISPRO 100 [IU]/ML
INJECTION, SOLUTION INTRAVENOUS; SUBCUTANEOUS
Qty: 90 ML | Refills: 1 | Status: SHIPPED | OUTPATIENT
Start: 2024-06-12

## 2024-06-12 NOTE — TELEPHONE ENCOUNTER
"Patient called needs \"NOVOLOG\" pen (not Humalog) sent to [facility]. Please call patient when this is completed.   "

## 2024-06-13 DIAGNOSIS — E13.9 TYPE 1.5 DIABETES, MANAGED AS TYPE 2 (HCC): Primary | ICD-10-CM

## 2024-06-13 RX ORDER — INSULIN ASPART 100 [IU]/ML
INJECTION, SOLUTION INTRAVENOUS; SUBCUTANEOUS
Qty: 90 ML | Refills: 0 | Status: SHIPPED | OUTPATIENT
Start: 2024-06-13

## 2024-06-13 NOTE — TELEPHONE ENCOUNTER
Patient calling back he needs novolog sent to \[facility]\"" [facility].    Pt is leaving this weekend needs done asap.       He stated humalog keeps getting sent instead and it is the wrong medication.

## 2024-06-14 NOTE — TELEPHONE ENCOUNTER
Left message for patient that the script was sent in for the Novolog. Requested a call back if he needed anything

## 2024-07-01 ENCOUNTER — OFFICE VISIT (OUTPATIENT)
Dept: SLEEP CENTER | Facility: CLINIC | Age: 54
End: 2024-07-01
Payer: COMMERCIAL

## 2024-07-01 VITALS
SYSTOLIC BLOOD PRESSURE: 130 MMHG | DIASTOLIC BLOOD PRESSURE: 70 MMHG | BODY MASS INDEX: 38.22 KG/M2 | WEIGHT: 273 LBS | HEIGHT: 71 IN

## 2024-07-01 DIAGNOSIS — G47.33 OSA (OBSTRUCTIVE SLEEP APNEA): Primary | ICD-10-CM

## 2024-07-01 DIAGNOSIS — R68.2 DRY MOUTH: ICD-10-CM

## 2024-07-01 DIAGNOSIS — E66.9 OBESITY (BMI 30-39.9): ICD-10-CM

## 2024-07-01 DIAGNOSIS — F51.12 INSUFFICIENT SLEEP SYNDROME: ICD-10-CM

## 2024-07-01 PROCEDURE — 99214 OFFICE O/P EST MOD 30 MIN: CPT | Performed by: INTERNAL MEDICINE

## 2024-07-01 NOTE — PROGRESS NOTES
Follow-Up Note - Sleep Center   Alex Wilde  54 y.o. male  :1970  MRN:[id number]  DOS:2024    CC: I saw this patient for follow-up in clinic today for Sleep disordered breathing, Coexisting Sleep and Medical Problems.  He has a Homa machine. Interval changes: None reported.    Results of prior studies: A diagnostic study in  demonstrated AHI of 34 per hour, higher during stage REM. Minimum oxygen saturation was 82% and 10.3% the study spent with saturations below 90%. During the subsequent therapeutic study, sleep disordered breathing was successfully remediated with nasal CPAP at 16 cm H2O.     PFSH, Problem List, Medications & Allergies were reviewed in EMR.   He  has a past medical history of Insulin pump in place (2019), Other specified congenital malformations of skin (2013), and Vitamin D deficiency.    He has a current medication list which includes the following prescription(s): aspirin, atorvastatin, lumigan, vitamin d, dexcom g6 , dexcom g6 sensor, dexcom g6 transmitter, dexcom g6 transmitter, dexcom g6 transmitter, dexcom g6 sensor, glucagon, glucose blood, insulin aspart, insulin lispro, bd pen needle amilcar u/f, lisinopril, methocarbamol, multivitamins plus zinc, onetouch delica lancets fine, semaglutide (2 mg/dose), and semglee (yfgn).    PHYSIOLOGICAL DATA REVIEW : Using PAP > 4 hours/night 100%. Estimated MORGAN 0.6/hour with pressure of 19cm H2O@90th/95th percentile; patient has not been using non FDA approved devices to sanitize the machine.  INTERPRETATION: Compliance is excellent; Pressure setting is:within target range; ;   SUBJECTIVE: With respect to use of PAP, Alex  is experiencing some adverse effects:dry mouth/throat.He derives benefit.  Is satisfied with sleep and daytime function.   Sleep Routine: Alex reports getting 6 hrs sleep; he has no difficulty initiating or maintaining sleep . He arises before alarm most days and feels more refreshed since on  "Rx.Alex reports significantly improved excessive daytime sleepiness,.  He rated himself at Total score: (P) 3 /24 on the [place] Sleepiness Scale.   Other issues: None reported.     Habits: Reports that he has never smoked. His smokeless tobacco use includes chew.,  Reports that he does not currently use alcohol.,  Reports no history of drug use., Caffeine use:limited; Exercise routine: regular.      ROS: Significant for around 5 pounds intentional weight reduction.  A 10 point review of systems was otherwise negative..    EXAM: /70   Ht 5' 11\" (1.803 m)   Wt 124 kg (273 lb)   BMI 38.08 kg/m²     Wt Readings from Last 3 Encounters:   07/01/24 124 kg (273 lb)   08/09/23 126 kg (278 lb)   06/14/23 126 kg (278 lb 6.4 oz)      Patient is well groomed; well appearing.   CNS: Alert, orientated, speech clear & coherent  Psych: cooperative and in no distress. Mental state: Appears normal.  H&N: EOMI; NC/AT: No facial pressure marks, no rashes.    Skin/Extrem: col & hydration normal; no edema  Resp: Respiratory effort is normal  Physical findings otherwise essentially unchanged from previous.    IMPRESSION: Problem List Items & Comorbidities Addressed this Visit    1. AYUSH (obstructive sleep apnea)  PAP DME Resupply/Reorder    PAP DME Pressure Change      2. Insufficient sleep syndrome        3. Dry mouth        4. Obesity (BMI 30-39.9)            PLAN:  I reviewed results of prior studies and physiologic data with the patient.   I discussed treatment options with risks and benefits.  Treatment with  PAP is medically necessary and Alex is agreable to continue use.   Care of equipment, methods to improve comfort using PAP and importance of compliance with therapy were discussed.  Pressure setting: adjust 17-20 cmH2O to reduce dryness.  However, if symptoms recur he will need to revert to higher pressure (18 to 20 cm H2O).    Rx provided to replace supplies and Care coordinated with DME provider.   Strategies to " "improve dry mouth were discussed. Specifically, ensuring adequate nasal patency, adjusting heat and humidity settings on PAP machine, using Biotene mouthwash &/or Xylimelt.  Discussed strategies for weight reduction.    Also advised allowing sufficient opportunity for sleep targeting upwards of 7 hours.  Follow-up is advised in 1 year or sooner if needed to monitor progress, compliance and to adjust therapy.     Thank you for allowing me to participate in the care of this patient.    Sincerely,     Authenticated electronically on 07/01/24   Board Certified Specialist     Portions of the record may have been created with voice recognition software. Occasional wrong word or \"sound a like\" substitutions may have occurred due to the inherent limitations of voice recognition software. There may also be notations and random deletions of words or characters from malfunctioning software. Read the chart carefully and recognize, using context, where substitutions/deletions have occurred.    "

## 2024-07-01 NOTE — PATIENT INSTRUCTIONS
Strategies to improve dry mouth were discussed. Specifically, ensuring adequate nasal patency, adjusting heat and humidity settings on PAP machine, using Biotene mouthwash &/or Xylimelt.    Nursing Support:  When: Monday through Friday 7A-5PM except holidays  Where: Our direct line is [phone].    If you are having a true emergency please call 911.  In the event that the line is busy or it is after hours please leave a voice message and we will return your call.  Please speak clearly, leaving your full name, birth date, best number to reach you and the reason for your call.   Medication refills: We will need the name of the medication, the dosage, the ordering provider, whether you get a 30 or 90 day refill, and the pharmacy name and address.  Medications will be ordered by the provider only.  Nurses cannot call in prescriptions.  Please allow 7 days for medication refills.  Physician requested updates: If your provider requested that you call with an update after starting medication, please be ready to provide us the medication and dosage, what time you take your medication, the time you attempt to fall asleep, time you fall asleep, when you wake up, and what time you get out of bed.  Sleep Study Results: We will contact you with sleep study results and/or next steps after the physician has reviewed your testing.

## 2024-07-02 ENCOUNTER — TELEPHONE (OUTPATIENT)
Dept: SLEEP CENTER | Facility: CLINIC | Age: 54
End: 2024-07-02

## 2024-07-03 LAB

## 2024-07-05 ENCOUNTER — APPOINTMENT (OUTPATIENT)
Dept: LAB | Age: 54
End: 2024-07-05
Payer: COMMERCIAL

## 2024-07-05 DIAGNOSIS — E13.9 TYPE 1.5 DIABETES, MANAGED AS TYPE 2 (HCC): ICD-10-CM

## 2024-07-05 DIAGNOSIS — Z00.8 ENCOUNTER FOR OTHER GENERAL EXAMINATION: ICD-10-CM

## 2024-07-05 DIAGNOSIS — I10 PRIMARY HYPERTENSION: ICD-10-CM

## 2024-07-05 LAB
ALBUMIN SERPL BCG-MCNC: 4 G/DL (ref 3.5–5)
ALP SERPL-CCNC: 72 U/L (ref 34–104)
ALT SERPL W P-5'-P-CCNC: 17 U/L (ref 7–52)
ANION GAP SERPL CALCULATED.3IONS-SCNC: 9 MMOL/L (ref 4–13)
AST SERPL W P-5'-P-CCNC: 14 U/L (ref 13–39)
BILIRUB SERPL-MCNC: 0.47 MG/DL (ref 0.2–1)
BUN SERPL-MCNC: 21 MG/DL (ref 5–25)
CALCIUM SERPL-MCNC: 9.2 MG/DL (ref 8.4–10.2)
CHLORIDE SERPL-SCNC: 103 MMOL/L (ref 96–108)
CHOLEST SERPL-MCNC: 102 MG/DL
CO2 SERPL-SCNC: 28 MMOL/L (ref 21–32)
CREAT SERPL-MCNC: 0.83 MG/DL (ref 0.6–1.3)
CREAT UR-MCNC: 101.7 MG/DL
EST. AVERAGE GLUCOSE BLD GHB EST-MCNC: 157 MG/DL
GFR SERPL CREATININE-BSD FRML MDRD: 101 ML/MIN/1.73SQ M
GLUCOSE P FAST SERPL-MCNC: 191 MG/DL (ref 65–99)
HBA1C MFR BLD: 7.1 %
HDLC SERPL-MCNC: 31 MG/DL
LDLC SERPL CALC-MCNC: 55 MG/DL (ref 0–100)
MICROALBUMIN UR-MCNC: 7.3 MG/L
MICROALBUMIN/CREAT 24H UR: 7 MG/G CREATININE (ref 0–30)
NONHDLC SERPL-MCNC: 71 MG/DL
POTASSIUM SERPL-SCNC: 4.3 MMOL/L (ref 3.5–5.3)
PROT SERPL-MCNC: 6.7 G/DL (ref 6.4–8.4)
SODIUM SERPL-SCNC: 140 MMOL/L (ref 135–147)
TRIGL SERPL-MCNC: 81 MG/DL

## 2024-07-05 PROCEDURE — 80061 LIPID PANEL: CPT

## 2024-07-05 PROCEDURE — 80053 COMPREHEN METABOLIC PANEL: CPT

## 2024-07-05 PROCEDURE — 82043 UR ALBUMIN QUANTITATIVE: CPT

## 2024-07-05 PROCEDURE — 36415 COLL VENOUS BLD VENIPUNCTURE: CPT

## 2024-07-05 PROCEDURE — 82570 ASSAY OF URINE CREATININE: CPT

## 2024-07-06 DIAGNOSIS — E10.65 TYPE 1 DIABETES MELLITUS WITH HYPERGLYCEMIA (HCC): ICD-10-CM

## 2024-07-07 RX ORDER — PROCHLORPERAZINE 25 MG/1
SUPPOSITORY RECTAL
Qty: 1 EACH | Refills: 0 | Status: SHIPPED | OUTPATIENT
Start: 2024-07-07

## 2024-07-08 ENCOUNTER — TELEPHONE (OUTPATIENT)
Dept: SLEEP CENTER | Facility: CLINIC | Age: 54
End: 2024-07-08

## 2024-07-08 NOTE — TELEPHONE ENCOUNTER
Received email from [employer]:  Re: Alex Wilde  : 1970 (54)  Order Date: 2024  Ordering provider: ?Antwon Moran MD    I have been assigned a pressure change for this patient. Unfortunately, I am unable to complete the pressure change for the following reason:   ____Device has not been powered on.   ____Unable to reach patient after 3 attempts.   __X__Patient Refuses Pressure Change.   ____Unable to obtain PIN #   _X__Other  - - Called to schedule the pressure change at the [facility] as the machine does not have a modem. PT stated that the pressure was changed when he went to the physician's office on 2024. I am unable to verify those changes.  ________________________________________________  Please advise the provider the pressure change could not be completed remotely.

## 2024-07-30 DIAGNOSIS — E10.65 TYPE 1 DIABETES MELLITUS WITH HYPERGLYCEMIA (HCC): ICD-10-CM

## 2024-07-31 RX ORDER — PROCHLORPERAZINE 25 MG/1
SUPPOSITORY RECTAL
Qty: 9 EACH | Refills: 0 | Status: SHIPPED | OUTPATIENT
Start: 2024-07-31

## 2024-08-17 DIAGNOSIS — H40.9 GLAUCOMA OF BOTH EYES, UNSPECIFIED GLAUCOMA TYPE: ICD-10-CM

## 2024-08-17 DIAGNOSIS — E78.2 MIXED HYPERLIPIDEMIA: ICD-10-CM

## 2024-08-17 DIAGNOSIS — E13.9 TYPE 1.5 DIABETES, MANAGED AS TYPE 2 (HCC): ICD-10-CM

## 2024-08-17 DIAGNOSIS — I10 PRIMARY HYPERTENSION: ICD-10-CM

## 2024-08-20 RX ORDER — ATORVASTATIN CALCIUM 80 MG/1
80 TABLET, FILM COATED ORAL DAILY
Qty: 90 TABLET | Refills: 0 | Status: SHIPPED | OUTPATIENT
Start: 2024-08-20

## 2024-08-20 RX ORDER — BIMATOPROST 0.1 MG/ML
1 SOLUTION/ DROPS OPHTHALMIC
Qty: 5 ML | Refills: 0 | Status: SHIPPED | OUTPATIENT
Start: 2024-08-20

## 2024-08-20 RX ORDER — LISINOPRIL 20 MG/1
20 TABLET ORAL DAILY
Qty: 90 TABLET | Refills: 1 | Status: SHIPPED | OUTPATIENT
Start: 2024-08-20

## 2024-08-28 ENCOUNTER — OFFICE VISIT (OUTPATIENT)
Dept: ENDOCRINOLOGY | Facility: CLINIC | Age: 54
End: 2024-08-28
Payer: COMMERCIAL

## 2024-08-28 VITALS
WEIGHT: 262.4 LBS | BODY MASS INDEX: 36.73 KG/M2 | HEART RATE: 85 BPM | RESPIRATION RATE: 18 BRPM | TEMPERATURE: 98.6 F | DIASTOLIC BLOOD PRESSURE: 68 MMHG | SYSTOLIC BLOOD PRESSURE: 118 MMHG | OXYGEN SATURATION: 96 % | HEIGHT: 71 IN

## 2024-08-28 DIAGNOSIS — E13.9 TYPE 1.5 DIABETES, MANAGED AS TYPE 2 (HCC): Primary | ICD-10-CM

## 2024-08-28 DIAGNOSIS — E66.01 MORBID OBESITY (HCC): ICD-10-CM

## 2024-08-28 DIAGNOSIS — E78.2 MIXED HYPERLIPIDEMIA: ICD-10-CM

## 2024-08-28 DIAGNOSIS — E04.1 THYROID NODULE: ICD-10-CM

## 2024-08-28 DIAGNOSIS — I10 BENIGN ESSENTIAL HYPERTENSION: ICD-10-CM

## 2024-08-28 PROCEDURE — 95251 CONT GLUC MNTR ANALYSIS I&R: CPT | Performed by: NURSE PRACTITIONER

## 2024-08-28 PROCEDURE — 99214 OFFICE O/P EST MOD 30 MIN: CPT | Performed by: NURSE PRACTITIONER

## 2024-08-28 RX ORDER — INSULIN ASPART 100 [IU]/ML
INJECTION, SOLUTION INTRAVENOUS; SUBCUTANEOUS
Qty: 90 ML | Refills: 1 | Status: SHIPPED | OUTPATIENT
Start: 2024-08-28

## 2024-08-28 NOTE — ASSESSMENT & PLAN NOTE
A1C is improving however, patient continues to have high variability in his blood sugars. Continuing to stack insulin leading to hypoglycemia which is overcorrected leading to hyperglycemia. He then stacks insulin to correct resuming the cycle. Continue Ozempic 2 mg once weekly. Continue Semglee 60 units nightly. Again reviewed onset, peak, and duration of action of novolog. Tighten IC to 1:6. Continue with ISF 30. Reviewed the importance of injecting his premeal novolog at least 15-20 minutes prior to eating. Reduce carbohydrate intake. Increase physical activity. Patient knows to notify me with persistent hyperglycemia or episodes of hypoglycemia. Patient would continue to remain off insulin pump at this time.   F/U in 4 months.     All labs reviewed with patient during this visit.  Will do a point-of-care hemoglobin A1c at next appointment.  Lab Results   Component Value Date    HGBA1C 7.1 (H) 07/05/2024

## 2024-08-28 NOTE — ASSESSMENT & PLAN NOTE
Patient has lost approximately 33 pounds since April of last year.  Tolerating 2 mg of Ozempic well.  Patient reports he is better at controlling his portions however, he is still choosing high calorie/high carbohydrate foods at times.

## 2024-08-28 NOTE — PATIENT INSTRUCTIONS
Continue 60 units of semglee  Adjust carb ratio to 1:6. Continue correction of 30. Goal .   Continue 2 mg of ozempic  Eat more protein and fewer carbs.

## 2024-08-28 NOTE — PROGRESS NOTES
Established Patient Progress Note    Chief Complaint:  Diabetes follow up visit    Impression & Plan:    Problem List Items Addressed This Visit       Mixed hyperlipidemia     Well-controlled.  Continue 80 mg of atorvastatin nightly.         Thyroid nodule     Next US due in February 2025. Denies compressive symptoms.          Benign essential hypertension     BP well-controlled at 118/68.  Continue 20 mg of lisinopril daily.         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 Continuing to stack insulin leading to hypoglycemia which is overcorrected leading to hyperglycemia. He then stacks insulin to correct resuming the cycle. Continue Ozempic 2 mg once weekly. Continue Semglee 60 units nightly. Again reviewed onset, peak, and duration of action of novolog. Tighten IC to 1:6. Continue with ISF 30. Reviewed the importance of injecting his premeal novolog at least 15-20 minutes prior to eating. Reduce carbohydrate intake. Increase physical activity. Patient knows to notify me with persistent hyperglycemia or episodes of hypoglycemia. Patient would continue to remain off insulin pump at this time.   F/U in 4 months.     All labs reviewed with patient during this visit.  Will do a point-of-care hemoglobin A1c at next appointment.  Lab Results   Component Value Date    HGBA1C 7.1 (H) 07/05/2024            Relevant Medications    insulin aspart (NovoLOG FlexPen) 100 UNIT/ML injection pen    Morbid obesity (HCC)     Patient has lost approximately 33 pounds since April of last year.  Tolerating 2 mg of Ozempic well.  Patient reports he is better at controlling his portions however, he is still choosing high calorie/high carbohydrate foods at times.            History of Present Illness:   Alex Wilde is a 54 y.o. male with HTN, HLD, thyroid nodule, and type 1.5 diabetes managed as type II with long term use of  insulin since 1999.  Complications of diabetes include peripheral neuropathy and retinopathy.  Patient presents today for late follow-up.  Patient was last seen in the office on 8/9/2023.  At that time, Ozempic was initiated.  Patient is now taking 2 mg once weekly and is tolerating this well.      Current regimen:  Ozempic 2 mg once weekly  Semglee 60 units nightly    [place] Andry   Device used Dexcom G6  Home use       Indication   Type 1.5 Diabetes    More than 72 hours of data was reviewed. Report to be scanned to chart.     Date Range: August 15, 2024-August 28, 2024    Analysis of data:   Average Glucose: 169 mg/dL  Coefficient of Variation: 41.4%  SD : 70 mg/dL  Time in Target Range: 54%  Time Above Range: 29% 181 to 250 mg/dL; 12% greater than 250 mg/dL  Time Below Range: 4% 54 to 69 mg/dL; 1% less than 54 mg/dL    Interpretation of data: Highly variable/suboptimal control.    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, managed as type 2 (HCC)    Morbid obesity (HCC)      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    Vitamin D deficiency       Past Surgical History:   Procedure Laterality Date    NO PAST SURGERIES        Family History   Problem Relation Age of Onset    Lung cancer Mother     Cancer Maternal Grandfather     Diabetes Maternal Grandfather     Diabetes Family     Other Family         cardiac disorder    Hypertension Family      Social History     Tobacco Use    Smoking status: Never     Passive exposure: Never    Smokeless tobacco: Current     Types: Chew    Tobacco comments:     Grandfather and mother   Substance Use Topics     Alcohol use: Not Currently     Comment: socially     No Known Allergies      Current Outpatient Medications:     aspirin 81 MG tablet, Take 1 tablet by mouth daily, Disp: , Rfl:     atorvastatin (LIPITOR) 80 mg tablet, Take 1 tablet (80 mg total) by mouth daily, Disp: 90 tablet, Rfl: 0    bimatoprost (Lumigan) 0.01 % ophthalmic drops, Administer 1 drop to both eyes daily at bedtime, Disp: 5 mL, Rfl: 0    Cholecalciferol (VITAMIN D) 2000 units CAPS, Take by mouth, Disp: , Rfl:     Continuous Blood Gluc  (DEXCOM G6 ) SHARON, Use daily as directed for CGM, Disp: 1 Device, Rfl: 0    Continuous Blood Gluc Transmit (Dexcom G6 Transmitter) MISC, USE DAILY AS DIRECTED FOR CONTINUOUS GLUCOSE MONITOR. CHANGE EVERY 3 MONTHS, Disp: 1 each, Rfl: 3    glucagon 1 MG injection, Inject as directed Twice daily, Disp: , Rfl:     glucose blood (ONE TOUCH ULTRA TEST) test strip, Test 6x daily, Disp: 600 each, Rfl: 3    insulin aspart (NovoLOG FlexPen) 100 UNIT/ML injection pen, Inject 4x daily per sliding scale. IC 1:6. ISF 30. Max daily dose 105 units., Disp: 90 mL, Rfl: 1    Insulin Pen Needle (BD Pen Needle Ashli U/F) 32G X 4 MM MISC, Use 4 per day, Disp: 100 each, Rfl: 3    lisinopril (ZESTRIL) 20 mg tablet, Take 1 tablet (20 mg total) by mouth daily, Disp: 90 tablet, Rfl: 1    ONETOUCH DELICA LANCETS FINE MISC, Use, Disp: , Rfl:     semaglutide, 2 mg/dose, (Ozempic, 2 MG/DOSE,) 8 mg/ mL injection pen, Inject 0.75 mL (2 mg total) under the skin every 7 days, Disp: 9 mL, Rfl: 0    Semglee, yfgn, 100 UNIT/ML SOPN, INJECT 60 UNITS NIGHTLY (DOSE ADJUSTMENT), Disp: 60 mL, Rfl: 3    Continuous Blood Gluc Sensor (Dexcom G6 Sensor) MISC, Use daily as directed for CGM - Change every 10 days (Patient not taking: Reported on 8/28/2024), Disp: 9 each, Rfl: 0    Continuous Blood Gluc Transmit (Dexcom G6 Transmitter) MISC, Use daily as directed for CGM - Change every 3 months (Patient not taking: Reported on 8/28/2024), Disp: 1  "each, Rfl: 0    Continuous Glucose Sensor (Dexcom G6 Sensor) MISC, Use daily as directed for CGM - Change every 10 days, Disp: 9 each, Rfl: 0    Continuous Glucose Transmitter (Dexcom G6 Transmitter) MISC, Use daily as directed for CGM - Change every 3 months (Patient not taking: Reported on 8/28/2024), Disp: 1 each, Rfl: 0    methocarbamol (Robaxin-750) 750 mg tablet, Take 1 tablet (750 mg total) by mouth every 8 (eight) hours as needed for muscle spasms (Patient not taking: Reported on 8/28/2024), Disp: 90 tablet, Rfl: 1    Multiple Vitamins-Minerals (MULTIVITAMINS PLUS ZINC) CAPS, Take by mouth (Patient not taking: Reported on 8/28/2024), Disp: , Rfl:     Review of Systems   Constitutional:  Negative for activity change, appetite change, fatigue and unexpected weight change.   HENT:  Negative for dental problem, sore throat, trouble swallowing and voice change.    Eyes:  Negative for visual disturbance.   Respiratory:  Negative for cough, chest tightness and shortness of breath.    Cardiovascular:  Negative for chest pain, palpitations and leg swelling.   Gastrointestinal:  Negative for constipation, diarrhea, nausea and vomiting.   Endocrine: Negative for polydipsia, polyphagia and polyuria.   Genitourinary:  Negative for frequency.   Musculoskeletal:  Negative for arthralgias, back pain, gait problem and myalgias.   Skin:  Negative for wound.   Allergic/Immunologic: Negative for environmental allergies and food allergies.   Neurological:  Positive for numbness. Negative for dizziness, weakness, light-headedness and headaches.   Psychiatric/Behavioral:  Negative for decreased concentration, dysphoric mood and sleep disturbance. The patient is not nervous/anxious.        Physical Exam:  Body mass index is 36.6 kg/m².  /68 (BP Location: Left arm, Patient Position: Sitting, Cuff Size: Standard)   Pulse 85   Temp 98.6 °F (37 °C) (Temporal)   Resp 18   Ht 5' 11\" (1.803 m)   Wt 119 kg (262 lb 6.4 oz)   " SpO2 96%   BMI 36.60 kg/m²    Wt Readings from Last 3 Encounters:   08/28/24 119 kg (262 lb 6.4 oz)   07/01/24 124 kg (273 lb)   08/09/23 126 kg (278 lb)       Physical Exam  Vitals reviewed.   Constitutional:       General: He is not in acute distress.     Appearance: He is well-developed. He is obese. He is not ill-appearing.   HENT:      Head: Normocephalic and atraumatic.   Eyes:      Pupils: Pupils are equal, round, and reactive to light.   Neck:      Thyroid: No thyromegaly.   Cardiovascular:      Rate and Rhythm: Normal rate.      Pulses: Normal pulses.   Pulmonary:      Effort: Pulmonary effort is normal.   Musculoskeletal:      Cervical back: Normal range of motion and neck supple.      Right lower leg: No edema.      Left lower leg: No edema.   Lymphadenopathy:      Cervical: No cervical adenopathy.   Skin:     General: Skin is warm and dry.      Capillary Refill: Capillary refill takes less than 2 seconds.   Neurological:      Mental Status: He is alert and oriented to person, place, and time.      Gait: Gait normal.   Psychiatric:         Mood and Affect: Mood normal.         Behavior: Behavior normal.           Labs:   Lab Results   Component Value Date    HGBA1C 7.1 (H) 07/05/2024    HGBA1C 6.5 08/09/2023    HGBA1C 6.9 (A) 01/31/2023     Lab Results   Component Value Date    CREATININE 0.83 07/05/2024    CREATININE 1.06 10/18/2022    CREATININE 0.96 12/31/2021    BUN 21 07/05/2024     11/06/2015    K 4.3 07/05/2024     07/05/2024    CO2 28 07/05/2024     eGFR   Date Value Ref Range Status   07/05/2024 101 ml/min/1.73sq m Final     Lab Results   Component Value Date    CHOL 148 08/04/2015    HDL 31 (L) 07/05/2024    TRIG 81 07/05/2024     Lab Results   Component Value Date    ALT 17 07/05/2024    AST 14 07/05/2024    ALKPHOS 72 07/05/2024    BILITOT 0.61 11/06/2015     Lab Results   Component Value Date    FEN8TAFLITDS 2.150 08/11/2020    NYX9XTYJZALY 1.460 07/25/2019    QBJ1LKUZZUTO 1.580  04/04/2018     Lab Results   Component Value Date    FREET4 0.88 08/11/2020       Discussed with the patient and all questioned fully answered. He will call me if any problems arise.    Follow-up appointment in 5 months.     Counseled patient on diagnostic results, prognosis, risk and benefit of treatment options, instruction for management, importance of treatment compliance, Risk  factor reduction and impressions    LISA Kaye

## 2024-09-12 DIAGNOSIS — E10.65 TYPE 1 DIABETES MELLITUS WITH HYPERGLYCEMIA (HCC): ICD-10-CM

## 2024-09-12 DIAGNOSIS — E13.9 TYPE 1.5 DIABETES, MANAGED AS TYPE 2 (HCC): ICD-10-CM

## 2024-09-13 RX ORDER — PEN NEEDLE, DIABETIC 32GX 5/32"
NEEDLE, DISPOSABLE MISCELLANEOUS
Qty: 400 EACH | Refills: 1 | Status: SHIPPED | OUTPATIENT
Start: 2024-09-13

## 2024-09-13 RX ORDER — INSULIN ASPART 100 [IU]/ML
INJECTION, SOLUTION INTRAVENOUS; SUBCUTANEOUS
Qty: 90 ML | Refills: 1 | Status: SHIPPED | OUTPATIENT
Start: 2024-09-13

## 2024-09-13 RX ORDER — INSULIN GLARGINE-YFGN 100 [IU]/ML
60 INJECTION, SOLUTION SUBCUTANEOUS
Qty: 60 ML | Refills: 1 | Status: SHIPPED | OUTPATIENT
Start: 2024-09-13

## 2024-09-16 ENCOUNTER — TELEPHONE (OUTPATIENT)
Dept: ENDOCRINOLOGY | Facility: CLINIC | Age: 54
End: 2024-09-16

## 2024-09-16 NOTE — TELEPHONE ENCOUNTER
09/16/24 12:15 PM    Hello, our patient Alex Wilde has had Diabetic Eye Exam completed/performed. Please assist in updating the patient chart by making an External outreach to [facility] facility located in [place]. The date of service is 9/16/2024.    Thank you,  Malka Bardales MA  [facility] [place]

## 2024-10-25 DIAGNOSIS — E10.65 TYPE 1 DIABETES MELLITUS WITH HYPERGLYCEMIA (HCC): ICD-10-CM

## 2024-10-28 RX ORDER — PROCHLORPERAZINE 25 MG/1
SUPPOSITORY RECTAL
Qty: 9 EACH | Refills: 1 | Status: SHIPPED | OUTPATIENT
Start: 2024-10-28

## 2024-10-28 RX ORDER — PROCHLORPERAZINE 25 MG/1
SUPPOSITORY RECTAL
Qty: 1 EACH | Refills: 1 | Status: SHIPPED | OUTPATIENT
Start: 2024-10-28

## 2024-11-09 DIAGNOSIS — E13.9 TYPE 1.5 DIABETES, MANAGED AS TYPE 2 (HCC): ICD-10-CM

## 2024-12-07 DIAGNOSIS — E10.65 TYPE 1 DIABETES MELLITUS WITH HYPERGLYCEMIA (HCC): ICD-10-CM

## 2024-12-07 DIAGNOSIS — E78.2 MIXED HYPERLIPIDEMIA: ICD-10-CM

## 2024-12-07 DIAGNOSIS — E13.9 TYPE 1.5 DIABETES, MANAGED AS TYPE 2 (HCC): ICD-10-CM

## 2024-12-07 DIAGNOSIS — I10 PRIMARY HYPERTENSION: ICD-10-CM

## 2024-12-09 RX ORDER — ATORVASTATIN CALCIUM 80 MG/1
80 TABLET, FILM COATED ORAL DAILY
Qty: 90 TABLET | Refills: 0 | Status: SHIPPED | OUTPATIENT
Start: 2024-12-09

## 2024-12-09 RX ORDER — INSULIN ASPART 100 [IU]/ML
INJECTION, SOLUTION INTRAVENOUS; SUBCUTANEOUS
Qty: 90 ML | Refills: 0 | Status: SHIPPED | OUTPATIENT
Start: 2024-12-09

## 2024-12-09 RX ORDER — LISINOPRIL 20 MG/1
20 TABLET ORAL DAILY
Qty: 90 TABLET | Refills: 0 | Status: SHIPPED | OUTPATIENT
Start: 2024-12-09

## 2024-12-09 RX ORDER — INSULIN GLARGINE-YFGN 100 [IU]/ML
60 INJECTION, SOLUTION SUBCUTANEOUS
Qty: 60 ML | Refills: 1 | Status: SHIPPED | OUTPATIENT
Start: 2024-12-09

## 2025-01-23 ENCOUNTER — PATIENT MESSAGE (OUTPATIENT)
Dept: FAMILY MEDICINE CLINIC | Facility: CLINIC | Age: 55
End: 2025-01-23

## 2025-02-10 DIAGNOSIS — E10.65 TYPE 1 DIABETES MELLITUS WITH HYPERGLYCEMIA (HCC): ICD-10-CM

## 2025-02-11 RX ORDER — PROCHLORPERAZINE 25 MG/1
SUPPOSITORY RECTAL
Qty: 1 EACH | Refills: 1 | Status: SHIPPED | OUTPATIENT
Start: 2025-02-11

## 2025-02-15 DIAGNOSIS — E13.9 TYPE 1.5 DIABETES, MANAGED AS TYPE 2 (HCC): ICD-10-CM

## 2025-02-15 DIAGNOSIS — E10.65 TYPE 1 DIABETES MELLITUS WITH HYPERGLYCEMIA (HCC): ICD-10-CM

## 2025-02-17 RX ORDER — INSULIN ASPART 100 [IU]/ML
INJECTION, SOLUTION INTRAVENOUS; SUBCUTANEOUS
Qty: 90 ML | Refills: 0 | Status: SHIPPED | OUTPATIENT
Start: 2025-02-17

## 2025-02-17 RX ORDER — INSULIN GLARGINE-YFGN 100 [IU]/ML
60 INJECTION, SOLUTION SUBCUTANEOUS
Qty: 60 ML | Refills: 0 | Status: SHIPPED | OUTPATIENT
Start: 2025-02-17

## 2025-02-17 NOTE — TELEPHONE ENCOUNTER
Patient needs updated blood work. Please place orders. A courtesy refill was provided.     A1C needed   Clothing

## 2025-02-21 DIAGNOSIS — E13.9 TYPE 1.5 DIABETES, MANAGED AS TYPE 2 (HCC): Primary | ICD-10-CM

## 2025-02-27 ENCOUNTER — TELEPHONE (OUTPATIENT)
Dept: ENDOCRINOLOGY | Facility: CLINIC | Age: 55
End: 2025-02-27

## 2025-02-27 NOTE — TELEPHONE ENCOUNTER
Left a voice message per Iva Farrell: Please let patient know that I have ordered labs for him to complete prior to his appointment. Thank you.

## 2025-03-04 ENCOUNTER — HOSPITAL ENCOUNTER (OUTPATIENT)
Dept: RADIOLOGY | Facility: IMAGING CENTER | Age: 55
Discharge: HOME | End: 2025-03-04
Payer: COMMERCIAL

## 2025-03-04 DIAGNOSIS — E78.2 MIXED HYPERLIPIDEMIA: ICD-10-CM

## 2025-03-04 DIAGNOSIS — E04.1 THYROID NODULE: ICD-10-CM

## 2025-03-04 DIAGNOSIS — I10 PRIMARY HYPERTENSION: ICD-10-CM

## 2025-03-04 PROCEDURE — 76536 US EXAM OF HEAD AND NECK: CPT

## 2025-03-05 RX ORDER — ATORVASTATIN CALCIUM 80 MG/1
80 TABLET, FILM COATED ORAL DAILY
Qty: 90 TABLET | Refills: 1 | Status: SHIPPED | OUTPATIENT
Start: 2025-03-05

## 2025-03-05 RX ORDER — LISINOPRIL 20 MG/1
20 TABLET ORAL DAILY
Qty: 90 TABLET | Refills: 1 | Status: SHIPPED | OUTPATIENT
Start: 2025-03-05

## 2025-03-07 ENCOUNTER — RESULTS FOLLOW-UP (OUTPATIENT)
Dept: ENDOCRINOLOGY | Facility: CLINIC | Age: 55
End: 2025-03-07

## 2025-03-07 DIAGNOSIS — E04.1 THYROID NODULE: Primary | ICD-10-CM

## 2025-03-24 ENCOUNTER — PATIENT MESSAGE (OUTPATIENT)
Dept: FAMILY MEDICINE CLINIC | Facility: CLINIC | Age: 55
End: 2025-03-24

## 2025-03-26 ENCOUNTER — TELEPHONE (OUTPATIENT)
Age: 55
End: 2025-03-26

## 2025-03-26 NOTE — TELEPHONE ENCOUNTER
"Patient calling in regards to new insurance and pharmacy.    Updated patients insurance adding [facility] as primary and his spouses [facility] secondary.    States \"I have to go through [facility] now and they said they would be sending electronically a request for my medications and everything but they did tell me that my basglar they wouldn't work with so Iva would have to pick between lantus or toujeo and send that to [facility].\"    Please advise if necessary.  "

## 2025-03-26 NOTE — TELEPHONE ENCOUNTER
PA for Ozempic, 2 MG SUBMITTED to [facility]    via      [x][employer]-Case ID # [id number]  [x]PA sent as URGENT    All office notes, labs and other pertaining documents and studies sent. Clinical questions answered. Awaiting determination from insurance company.     Turnaround time for your insurance to make a decision on your Prior Authorization can take 7-21 business days.

## 2025-03-26 NOTE — TELEPHONE ENCOUNTER
PA for Ozempic, 2 MG  APPROVED     Date(s) approved 3/26/25-3/26/26    Case #[id number]    Patient advised by          [x][employer] Message  []Phone call   []LMOM  []L/M to call office as no active Communication consent on file  []Unable to leave detailed message as VM not approved on Communication consent       Pharmacy advised by    [x]Fax  []Phone call  []Secure Chat    Specialty Pharmacy    []      Approval letter scanned into [employer] Yes

## 2025-03-26 NOTE — TELEPHONE ENCOUNTER
"Patient calling in regards to semaglutide, 2 mg/dose, (Ozempic, 2 MG/DOSE,) 8 mg/ mL injection pen     States \"I have my wife's [employer] insurance but I just added [employer] as well and [facility] is supposed to be sending electronic request for my medications but they did say ozempic would need a proir authorization with united healthcare. They said you're supposed to call [phone] for the ozempic prior auth.\"    Patient requesting prior authorization be ran through [employer].  "

## 2025-03-28 ENCOUNTER — PATIENT MESSAGE (OUTPATIENT)
Dept: ENDOCRINOLOGY | Facility: CLINIC | Age: 55
End: 2025-03-28

## 2025-04-22 ENCOUNTER — OFFICE VISIT (OUTPATIENT)
Dept: ENDOCRINOLOGY | Facility: CLINIC | Age: 55
End: 2025-04-22
Payer: COMMERCIAL

## 2025-04-22 VITALS
DIASTOLIC BLOOD PRESSURE: 78 MMHG | BODY MASS INDEX: 37.15 KG/M2 | HEART RATE: 88 BPM | RESPIRATION RATE: 16 BRPM | WEIGHT: 265.4 LBS | HEIGHT: 71 IN | TEMPERATURE: 98 F | SYSTOLIC BLOOD PRESSURE: 142 MMHG | OXYGEN SATURATION: 98 %

## 2025-04-22 DIAGNOSIS — E78.2 MIXED HYPERLIPIDEMIA: ICD-10-CM

## 2025-04-22 DIAGNOSIS — E13.9 TYPE 1.5 DIABETES, MANAGED AS TYPE 2 (HCC): Primary | ICD-10-CM

## 2025-04-22 DIAGNOSIS — I10 BENIGN ESSENTIAL HYPERTENSION: ICD-10-CM

## 2025-04-22 DIAGNOSIS — E66.01 MORBID OBESITY (HCC): ICD-10-CM

## 2025-04-22 DIAGNOSIS — E04.1 THYROID NODULE: ICD-10-CM

## 2025-04-22 LAB — SL AMB POCT HEMOGLOBIN AIC: 6.9 (ref ?–6.5)

## 2025-04-22 PROCEDURE — 83036 HEMOGLOBIN GLYCOSYLATED A1C: CPT | Performed by: NURSE PRACTITIONER

## 2025-04-22 PROCEDURE — 95251 CONT GLUC MNTR ANALYSIS I&R: CPT | Performed by: NURSE PRACTITIONER

## 2025-04-22 PROCEDURE — 99214 OFFICE O/P EST MOD 30 MIN: CPT | Performed by: NURSE PRACTITIONER

## 2025-04-22 RX ORDER — INSULIN GLARGINE 100 [IU]/ML
60 INJECTION, SOLUTION SUBCUTANEOUS
COMMUNITY
Start: 2025-02-18 | End: 2025-04-22 | Stop reason: CLARIF

## 2025-04-22 RX ORDER — INSULIN GLARGINE 300 U/ML
INJECTION, SOLUTION SUBCUTANEOUS
Qty: 18 ML | Refills: 1 | Status: SHIPPED | OUTPATIENT
Start: 2025-04-22

## 2025-04-22 RX ORDER — INSULIN LISPRO 200 [IU]/ML
INJECTION, SOLUTION SUBCUTANEOUS
Qty: 48 ML | Refills: 1 | Status: SHIPPED | OUTPATIENT
Start: 2025-04-22

## 2025-04-22 NOTE — PROGRESS NOTES
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 Significant dinah phenomenon also noted with hyperglycemia from 0359-6060, sometimes exceeding 300 mg/dL. Patient continued to struggle with stacking his insulin resulting in hypoglycemia. I believe he would do very well with an insulin pump. He had used a Medtronic 670G in the past and found it to be burdensome. Explained the differences between old model and current Medtronic 780 G. He is willing to investigate further. Will contact Medtronic educator to assist him. If he wishes to proceed, will notify me for orders. Again reviewed the importance of avoiding insulin stacking when blood sugars are elevated. Reviewed appropriate timing of pre-meal insulin so as to prevent hyperglycemia. Continue with healthy diet and regular physical activity. Patient knows to notify me with persistent hyperglycemia or episodes of hypoglycemia.  Continue Ozempic 2 mg once weekly.  F/U in 4 months.   Lab Results   Component Value Date    HGBA1C 6.9 (A) 2025       Orders:    POCT hemoglobin A1c    Diabetic foot exam; Future    insulin glargine (Toujeo Max SoloStar) 300 units/mL CONCENTRATED U-300 injection pen (2-unit dial); Inject 60 units under the skin nightly.    insuln lispro (HumaLOG KwikPen) 200 units/mL CONCENTRATED U-200 injection pen; Inject 4x daily per sliding scale. IC 1:6. ISF 30. Max daily dose 105 units    Benign essential hypertension  BP today 142/78.  Managed by PCP.  Continue 20 mg of lisinopril daily.         Thyroid nodule  Ultrasound reviewed.  Denies compressive symptoms.  No history of radiation to head or neck.    Narrative &  Impression   THYROID ULTRASOUND     INDICATION: E04.1: Nontoxic single thyroid nodule.     COMPARISON: Compared with 2/23/2024     TECHNIQUE: Ultrasound of the thyroid was performed with a high frequency linear transducer in transverse and sagittal planes including volumetric imaging sweeps as well as traditional still imaging technique.     FINDINGS:  Thyroid texture: Normal homogeneous smooth echotexture.     Right lobe: 4.6 x 2.2 x 1.8 cm. Volume 8.6 mL  Left lobe: 3.6 x 1.1 x 1.5 cm. Volume 2.9 mL  Isthmus: 0.2 cm.     Nodule #1.  Image 17  Right midpole nodule measuring 1.2 x 0.7 x 0.8 cm compared to 1.2 x 0.8 x 1.1 cm.  COMPOSITION:  2 points, solid or almost completely solid .  ECHOGENICITY:  2 points, hypoechoic.  SHAPE:  0 points, wider-than-tall.  MARGIN: 0 points, ill-defined.  ECHOGENIC FOCI:  1 point, macrocalcifications.  TI-RADS Classification: TR 4 (4-6 points), FNA if > 1.5 cm. Follow if > 1cm.     Nodule #2.  Image 22  Right lower pole nodule measuring 1.2 x 1.0 x 1.0 cm compared to 1.1 x 0.9 x 0.9 cm.  .  COMPOSITION:  2 points, solid or almost completely solid .  ECHOGENICITY:  2 points, hypoechoic.  SHAPE:  0 points, wider-than-tall.  MARGIN: 0 points, ill-defined.  ECHOGENIC FOCI:  0 points, none or large comet-tail artifacts.  TI-RADS Classification: TR 4 (4-6 points), FNA if > 1.5 cm. Follow if > 1cm.     There are additional nodules of lesser size and/or TI-RADS score.  These do not necessitate additional evaluation based on ACR criteria.            Mixed hyperlipidemia  Continue 80 mg of atorvastatin nightly.         Morbid obesity (HCC)  Patient has lost approximately 10 pounds since his last appointment.  Continue Ozempic 2 mg once weekly.  Exogenous insulin needs have decreased.             History of Present Illness   HPI  Alex Wilde is a 55 y.o. male with type 1.5 diabetes managed as type II since 1999, hypertension, hyperlipidemia, and thyroid nodule presenting to the office today  for routine follow-up.    At patient's last appointment on 8/28/2024, we reviewed the importance of avoiding insulin stacking.    Patient was to continue Ozempic 2 mg once weekly, Semglee 60 units nightly, and use a carbohydrate ratio of 1:6 and ISF of 30.    Patient did not complete labs prior to today's appointment.    POC HgA1c 6.9%.    [employer]   Device used Dexcom G6  Home use     Indication   Type 1.5 diabetes managed as type 2    More than 72 hours of data was reviewed. Report to be scanned to chart.     Date Range: April 9, 2025-April 22, 2025    Analysis of data:   Average Glucose: 155 mg/dL  Coefficient of Variation: 47.9%  SD : 74 mg/dL  Time in Target Range: 68%  Time Above Range: 15% 181 to 250 mg/dL; 11% greater than 250 mg/dL  Time Below Range: 5% 54 to 69 mg/dL; 1% less than 54 mg/dL    Interpretation of data: Consistent hyperglycemia noted between 9344-9182. Patient reports he does tend to snack right before bed, which could be contributing. He admits to stacking his insulin as he cannot tolerate having high blood sugars.     Formulary change in 2025. Needs Toujeo and Humalog.                 Review of Systems   Constitutional:  Negative for activity change, appetite change, fatigue and unexpected weight change.   HENT:  Negative for dental problem, sore throat, trouble swallowing and voice change.    Eyes:  Negative for visual disturbance.   Respiratory:  Negative for cough, chest tightness and shortness of breath.    Cardiovascular:  Negative for chest pain, palpitations and leg swelling.   Gastrointestinal:  Negative for constipation, diarrhea, nausea and vomiting.   Endocrine: Negative for polydipsia, polyphagia and polyuria.   Genitourinary:  Negative for frequency.   Musculoskeletal:  Negative for arthralgias, back pain, gait problem and myalgias.   Skin:  Negative for wound.   Allergic/Immunologic: Negative for environmental allergies and food allergies.   Neurological:  Negative for  "dizziness, weakness, light-headedness, numbness and headaches.   Psychiatric/Behavioral:  Negative for decreased concentration, dysphoric mood and sleep disturbance. The patient is not nervous/anxious.           Objective   /78 (BP Location: Right arm, Patient Position: Sitting, Cuff Size: Adult)   Pulse 88   Temp 98 °F (36.7 °C) (Temporal)   Resp 16   Ht 5' 11\" (1.803 m)   Wt 120 kg (265 lb 6.4 oz)   SpO2 98%   BMI 37.02 kg/m²      Physical Exam  Vitals reviewed.   Constitutional:       General: He is not in acute distress.     Appearance: He is well-developed. He is obese. He is not ill-appearing.   HENT:      Head: Normocephalic and atraumatic.   Eyes:      Conjunctiva/sclera: Conjunctivae normal.   Cardiovascular:      Rate and Rhythm: Normal rate and regular rhythm.      Pulses: Normal pulses.      Heart sounds: Normal heart sounds. No murmur heard.  Pulmonary:      Effort: Pulmonary effort is normal. No respiratory distress.      Breath sounds: Normal breath sounds.   Abdominal:      Palpations: Abdomen is soft.      Tenderness: There is no abdominal tenderness.   Musculoskeletal:         General: No swelling.      Cervical back: Normal range of motion and neck supple.      Right lower leg: No edema.      Left lower leg: No edema.   Skin:     General: Skin is warm and dry.      Capillary Refill: Capillary refill takes less than 2 seconds.   Neurological:      Mental Status: He is alert.   Psychiatric:         Mood and Affect: Mood normal.           "

## 2025-04-22 NOTE — ASSESSMENT & PLAN NOTE
While A1C is reasonable, patient continues to have high glucose variability with CV 47.9%. Significant dinah phenomenon also noted with hyperglycemia from 7489-0552, sometimes exceeding 300 mg/dL. Patient continued to struggle with stacking his insulin resulting in hypoglycemia. I believe he would do very well with an insulin pump. He had used a Medtronic 670G in the past and found it to be burdensome. Explained the differences between old model and current Medtronic 780 G. He is willing to investigate further. Will contact Medtronic educator to assist him. If he wishes to proceed, will notify me for orders. Again reviewed the importance of avoiding insulin stacking when blood sugars are elevated. Reviewed appropriate timing of pre-meal insulin so as to prevent hyperglycemia. Continue with healthy diet and regular physical activity. Patient knows to notify me with persistent hyperglycemia or episodes of hypoglycemia.  Continue Ozempic 2 mg once weekly.  F/U in 4 months.   Lab Results   Component Value Date    HGBA1C 6.9 (A) 04/22/2025       Orders:    POCT hemoglobin A1c    Diabetic foot exam; Future    insulin glargine (Toujeo Max SoloStar) 300 units/mL CONCENTRATED U-300 injection pen (2-unit dial); Inject 60 units under the skin nightly.    insuln lispro (HumaLOG KwikPen) 200 units/mL CONCENTRATED U-200 injection pen; Inject 4x daily per sliding scale. IC 1:6. ISF 30. Max daily dose 105 units

## 2025-04-22 NOTE — ASSESSMENT & PLAN NOTE
Patient has lost approximately 10 pounds since his last appointment.  Continue Ozempic 2 mg once weekly.  Exogenous insulin needs have decreased.

## 2025-04-22 NOTE — ASSESSMENT & PLAN NOTE
Ultrasound reviewed.  Denies compressive symptoms.  No history of radiation to head or neck.    Narrative & Impression   THYROID ULTRASOUND     INDICATION: E04.1: Nontoxic single thyroid nodule.     COMPARISON: Compared with 2/23/2024     TECHNIQUE: Ultrasound of the thyroid was performed with a high frequency linear transducer in transverse and sagittal planes including volumetric imaging sweeps as well as traditional still imaging technique.     FINDINGS:  Thyroid texture: Normal homogeneous smooth echotexture.     Right lobe: 4.6 x 2.2 x 1.8 cm. Volume 8.6 mL  Left lobe: 3.6 x 1.1 x 1.5 cm. Volume 2.9 mL  Isthmus: 0.2 cm.     Nodule #1.  Image 17  Right midpole nodule measuring 1.2 x 0.7 x 0.8 cm compared to 1.2 x 0.8 x 1.1 cm.  COMPOSITION:  2 points, solid or almost completely solid .  ECHOGENICITY:  2 points, hypoechoic.  SHAPE:  0 points, wider-than-tall.  MARGIN: 0 points, ill-defined.  ECHOGENIC FOCI:  1 point, macrocalcifications.  TI-RADS Classification: TR 4 (4-6 points), FNA if > 1.5 cm. Follow if > 1cm.     Nodule #2.  Image 22  Right lower pole nodule measuring 1.2 x 1.0 x 1.0 cm compared to 1.1 x 0.9 x 0.9 cm.  .  COMPOSITION:  2 points, solid or almost completely solid .  ECHOGENICITY:  2 points, hypoechoic.  SHAPE:  0 points, wider-than-tall.  MARGIN: 0 points, ill-defined.  ECHOGENIC FOCI:  0 points, none or large comet-tail artifacts.  TI-RADS Classification: TR 4 (4-6 points), FNA if > 1.5 cm. Follow if > 1cm.     There are additional nodules of lesser size and/or TI-RADS score.  These do not necessitate additional evaluation based on ACR criteria.

## 2025-04-23 ENCOUNTER — TELEPHONE (OUTPATIENT)
Age: 55
End: 2025-04-23

## 2025-04-24 ENCOUNTER — TELEPHONE (OUTPATIENT)
Dept: ENDOCRINOLOGY | Facility: CLINIC | Age: 55
End: 2025-04-24

## 2025-04-25 NOTE — TELEPHONE ENCOUNTER
Form printed and placed on Iva's desk to be filled out on Tuesday 4/29/2025 when she returns to the office.

## 2025-04-25 NOTE — TELEPHONE ENCOUNTER
Ruth from [employer] reached out to me to see if Iva could sign the Certificate of Medical Necessity while she was at the [place] office today.    Iva is doing Virtual Appts today and not in the office    Attaching form (in media) so it can be printed out and completed by Iva on Monday.

## 2025-04-28 ENCOUNTER — PATIENT MESSAGE (OUTPATIENT)
Dept: ENDOCRINOLOGY | Facility: CLINIC | Age: 55
End: 2025-04-28

## 2025-04-28 NOTE — TELEPHONE ENCOUNTER
Patient called in to advise that insurance company said we need to call them at [phone] for a PA for the Dexcom sensors. Patient will be out on 05/03.

## 2025-04-28 NOTE — TELEPHONE ENCOUNTER
Haroon from [employer] called to get an update on the medical necessity form he faxed over for the patient's supplies and the clinical notes request.    I did confirm that the fax was received and has been placed on the provider's desk for when she returns tomorrow.    He verbalized his understanding and wants to remind us to send clinical notes.

## 2025-04-29 ENCOUNTER — TELEPHONE (OUTPATIENT)
Dept: FAMILY MEDICINE CLINIC | Facility: CLINIC | Age: 55
End: 2025-04-29

## 2025-04-29 ENCOUNTER — TELEPHONE (OUTPATIENT)
Dept: ENDOCRINOLOGY | Facility: CLINIC | Age: 55
End: 2025-04-29

## 2025-04-29 NOTE — TELEPHONE ENCOUNTER
PA for Dexcom G6 Sensor SUBMITTED to [employer]    via    [x]CMM-KEY: [id number]  []Surescripts-Case ID #   []Availity-Auth ID # NDC #   []Faxed to plan   []Other website   []Phone call Case ID #     [x]PA sent as URGENT    All office notes, labs and other pertaining documents and studies sent. Clinical questions answered. Awaiting determination from insurance company.     Turnaround time for your insurance to make a decision on your Prior Authorization can take 7-21 business days.

## 2025-04-29 NOTE — TELEPHONE ENCOUNTER
Pt is due for a Physical; no PE noted in Chart. Pt is due for for diabetic eye exam; last on 1/28/22.    Lm for pt to call and schedule.

## 2025-04-30 ENCOUNTER — TELEPHONE (OUTPATIENT)
Age: 55
End: 2025-04-30

## 2025-04-30 NOTE — TELEPHONE ENCOUNTER
Haroon castelan from [id number] stating they received the medical necessity form but it is incomplete. Please provide diagnosis code and fill out sections under CGM or insulin pump for the product needed.  Please fax back to [employer].

## 2025-04-30 NOTE — TELEPHONE ENCOUNTER
Duplicate encounter created, please see telephone encounter from 04/24/2025 regarding Dexcom G6 Sensor PA status. Please review patient's chart to see if there is already an encounter regarding the medication in question and to document anything regarding this medication in regards to anything regarding the authorization process etc before creating another encounter Thank You.

## 2025-05-06 NOTE — TELEPHONE ENCOUNTER
Haroon calling from [employer] stating the diagnosis code used on the form sent over was E13.9. Medtronic pumps are only FDA approved for Type 1 diabetes. The diagnosis code used is not appropriate. Haroon states he sent over a blank form to be filled out for off label as Medtronic will not be approved with the diagnosis provided.

## 2025-05-08 NOTE — TELEPHONE ENCOUNTER
Haroon calling again he did not fax new form yet, he was just hoping form could be corrected but also he said it was blurry.  Upon review of form, it is blurry, he will fax new form.

## 2025-05-15 ENCOUNTER — OFFICE VISIT (OUTPATIENT)
Dept: FAMILY MEDICINE CLINIC | Facility: CLINIC | Age: 55
End: 2025-05-15
Payer: COMMERCIAL

## 2025-05-15 VITALS
WEIGHT: 259 LBS | TEMPERATURE: 98 F | BODY MASS INDEX: 36.26 KG/M2 | DIASTOLIC BLOOD PRESSURE: 66 MMHG | HEART RATE: 88 BPM | SYSTOLIC BLOOD PRESSURE: 122 MMHG | HEIGHT: 71 IN | RESPIRATION RATE: 16 BRPM | OXYGEN SATURATION: 96 %

## 2025-05-15 DIAGNOSIS — E78.6 LOW HDL (UNDER 40): ICD-10-CM

## 2025-05-15 DIAGNOSIS — I10 BENIGN ESSENTIAL HYPERTENSION: ICD-10-CM

## 2025-05-15 DIAGNOSIS — Z13.220 SCREENING FOR HYPERLIPIDEMIA: ICD-10-CM

## 2025-05-15 DIAGNOSIS — Z12.5 SCREENING FOR PROSTATE CANCER: ICD-10-CM

## 2025-05-15 DIAGNOSIS — E13.9 TYPE 1.5 DIABETES, MANAGED AS TYPE 2 (HCC): ICD-10-CM

## 2025-05-15 DIAGNOSIS — Z00.00 ANNUAL PHYSICAL EXAM: Primary | ICD-10-CM

## 2025-05-15 DIAGNOSIS — Z12.11 SCREENING FOR COLON CANCER: ICD-10-CM

## 2025-05-15 PROCEDURE — 99396 PREV VISIT EST AGE 40-64: CPT | Performed by: FAMILY MEDICINE

## 2025-05-15 NOTE — PATIENT INSTRUCTIONS
"Patient Education     Routine physical for adults   The Basics   Written by the doctors and editors at [facility]   What is a physical? -- A physical is a routine visit, or \"check-up,\" with your doctor. You might also hear it called a \"wellness visit\" or \"preventive visit.\"  During each visit, the doctor will:   Ask about your physical and mental health   Ask about your habits, behaviors, and lifestyle   Do an exam   Give you vaccines if needed   Talk to you about any medicines you take   Give advice about your health   Answer your questions  Getting regular check-ups is an important part of taking care of your health. It can help your doctor find and treat any problems you have. But it's also important for preventing health problems.  A routine physical is different from a \"sick visit.\" A sick visit is when you see a doctor because of a health concern or problem. Since physicals are scheduled ahead of time, you can think about what you want to ask the doctor.  How often should I get a physical? -- It depends on your age and health. In general, for people age 21 years and older:   If you are younger than 50 years, you might be able to get a physical every 3 years.   If you are 50 years or older, your doctor might recommend a physical every year.  If you have an ongoing health condition, like diabetes or high blood pressure, your doctor will probably want to see you more often.  What happens during a physical? -- In general, each visit will include:   Physical exam - The doctor or nurse will check your height, weight, heart rate, and blood pressure. They will also look at your eyes and ears. They will ask about how you are feeling and whether you have any symptoms that bother you.   Medicines - It's a good idea to bring a list of all the medicines you take to each doctor visit. Your doctor will talk to you about your medicines and answer any questions. Tell them if you are having any side effects that bother you. You " "should also tell them if you are having trouble paying for any of your medicines.   Habits and behaviors - This includes:   Your diet   Your exercise habits   Whether you smoke, drink alcohol, or use drugs   Whether you are sexually active   Whether you feel safe at home  Your doctor will talk to you about things you can do to improve your health and lower your risk of health problems. They will also offer help and support. For example, if you want to quit smoking, they can give you advice and might prescribe medicines. If you want to improve your diet or get more physical activity, they can help you with this, too.   Lab tests, if needed - The tests you get will depend on your age and situation. For example, your doctor might want to check your:   Cholesterol   Blood sugar   Iron level   Vaccines - The recommended vaccines will depend on your age, health, and what vaccines you already had. Vaccines are very important because they can prevent certain serious or deadly infections.   Discussion of screening - \"Screening\" means checking for diseases or other health problems before they cause symptoms. Your doctor can recommend screening based on your age, risk, and preferences. This might include tests to check for:   Cancer, such as breast, prostate, cervical, ovarian, colorectal, prostate, lung, or skin cancer   Sexually transmitted infections, such as chlamydia and gonorrhea   Mental health conditions like depression and anxiety  Your doctor will talk to you about the different types of screening tests. They can help you decide which screenings to have. They can also explain what the results might mean.   Answering questions - The physical is a good time to ask the doctor or nurse questions about your health. If needed, they can refer you to other doctors or specialists, too.  Adults older than 65 years often need other care, too. As you get older, your doctor will talk to you about:   How to prevent falling at " home   Hearing or vision tests   Memory testing   How to take your medicines safely   Making sure that you have the help and support you need at home  All topics are updated as new evidence becomes available and our peer review process is complete.  This topic retrieved from [facility] on: May 02, 2024.  Topic 180935 Version 1.0  Release: 32.4.3 - C32.122  © 2024 UpToDate, Inc. and/or its affiliates. All rights reserved.  Consumer Information Use and Disclaimer   Disclaimer: This generalized information is a limited summary of diagnosis, treatment, and/or medication information. It is not meant to be comprehensive and should be used as a tool to help the user understand and/or assess potential diagnostic and treatment options. It does NOT include all information about conditions, treatments, medications, side effects, or risks that may apply to a specific patient. It is not intended to be medical advice or a substitute for the medical advice, diagnosis, or treatment of a health care provider based on the health care provider's examination and assessment of a patient's specific and unique circumstances. Patients must speak with a health care provider for complete information about their health, medical questions, and treatment options, including any risks or benefits regarding use of medications. This information does not endorse any treatments or medications as safe, effective, or approved for treating a specific patient. UpToDate, Inc. and its affiliates disclaim any warranty or liability relating to this information or the use thereof.The use of this information is governed by the Terms of Use, available at https://www.[employer].com/en/know/clinical-effectiveness-terms. 2024© UpToDate, Inc. and its affiliates and/or licensors. All rights reserved.  Copyright   © 2024 UpToDate, Inc. and/or its affiliates. All rights reserved.  DM well controlled and sees endo as directed and takes all meds as directed and rec  strength training and get at least 8 hours sleep per night and rec also sunscreen and monitor for ticks.

## 2025-05-15 NOTE — ASSESSMENT & PLAN NOTE
Exercise as tolerated to increase hdl  Orders:    Lipid Panel with Direct LDL reflex; Future    Comprehensive metabolic panel; Future

## 2025-05-15 NOTE — PROGRESS NOTES
Diabetic Foot Exam    Patient's shoes and socks removed.    Right Foot/Ankle   Right Foot Inspection  Skin Exam: skin normal and skin intact. No dry skin, no warmth, no callus, no erythema, no maceration, no abnormal color, no pre-ulcer, no ulcer and no callus.     Toe Exam: ROM and strength within normal limits.     Sensory   Vibration: intact  Proprioception: intact  Monofilament testing: intact    Vascular  Capillary refills: < 3 seconds  The right DP pulse is 2+. The right PT pulse is 2+.     Left Foot/Ankle  Left Foot Inspection  Skin Exam: skin normal and skin intact. No dry skin, no warmth, no erythema, no maceration, normal color, no pre-ulcer, no ulcer and no callus.     Toe Exam: ROM and strength within normal limits.     Sensory   Vibration: intact  Proprioception: intact  Monofilament testing: intact    Vascular  Capillary refills: < 3 seconds  The left DP pulse is 2+. The left PT pulse is 2+.     Assign Risk Category  No deformity present  No loss of protective sensation  No weak pulses  Risk: 0  Adult Annual Physical  Name: Alex Wilde      : 1970      MRN: [id number]  Encounter Provider: Doanld Newton DO  Encounter Date: 5/15/2025   Encounter department: [facility] PRIMARY CARE    :  Assessment & Plan  Type 1.5 diabetes, managed as type 2 (HCC)    Lab Results   Component Value Date    HGBA1C 6.9 (A) 2025            Annual physical exam  Check labs and rec colon screening       Low HDL (under 40)  Exercise as tolerated to increase hdl  Orders:    Lipid Panel with Direct LDL reflex; Future    Comprehensive metabolic panel; Future    Screening for prostate cancer  Check labs  Orders:    PSA, Total Screen; Future    Screening for colon cancer  Consult GI  Orders:    Ambulatory Referral to Gastroenterology; Future    Benign essential hypertension  stable       Screening for hyperlipidemia  Check labs, low cholesterol diet encouraged  Orders:    Lipid Panel with Direct LDL  reflex; Future    Comprehensive metabolic panel; Future        Preventive Screenings:  - Diabetes Screening: screening not indicated and has diabetes  - Cholesterol Screening: screening not indicated and has hyperlipidemia   - Lung cancer screening: screening not indicated     Immunizations:  - Immunizations due: Prevnar 20, Tdap and Zoster (Shingrix)      Depression Screening and Follow-up Plan: Patient was screened for depression during today's encounter. They screened negative with a PHQ-2 score of 0.      Tobacco Cessation Counseling: Tobacco cessation counseling was provided. The patient is sincerely urged to quit consumption of tobacco. He is not ready to quit tobacco. Patient quit tobacco        History of Present Illness     Adult Annual Physical:  Patient presents for annual physical. Patient is 55 years old and  and has 1 child age 20 and in college. Patient is exercising. Low sugar diet and does walk. Patient sleeps 6 hours per night, Patient is currently  and construction. Non smoker but chews tobacco and does not drink. .     Diet and Physical Activity:  - Diet/Nutrition: no special diet.  - Exercise: moderate cardiovascular exercise and 5-7 times a week on average.    Depression Screening:  - PHQ-2 Score: 0    General Health:  - Sleep: sleeps well and 4-6 hours of sleep on average.  - Hearing: normal hearing bilateral ears.  - Vision: no vision problems and most recent eye exam < 1 year ago.  - Dental: no dental visits for > 1 year, brushes teeth twice daily and does not floss.     Health:  - History of STDs: no.   - Urinary symptoms: none.     Advanced Care Planning:  - Has an advanced directive?: no    - Has a durable medical POA?: no    - ACP document given to patient?: no      Review of Systems   Constitutional: Negative.    HENT: Negative.     Eyes: Negative.    Respiratory: Negative.     Cardiovascular: Negative.    Gastrointestinal: Negative.    Endocrine: Negative.   "  Genitourinary: Negative.    Musculoskeletal: Negative.    Skin: Negative.    Allergic/Immunologic: Negative.    Neurological: Negative.    Hematological: Negative.    Psychiatric/Behavioral: Negative.       Medications Ordered Prior to Encounter[1]     Objective   /66   Pulse 88   Temp 98 °F (36.7 °C) (Temporal)   Resp 16   Ht 5' 11\" (1.803 m)   Wt 117 kg (259 lb)   SpO2 96%   BMI 36.12 kg/m²     Physical Exam  Constitutional:       Appearance: He is well-developed. He is obese.   HENT:      Head: Normocephalic and atraumatic.      Right Ear: External ear normal.      Left Ear: External ear normal.      Nose: Nose normal.      Mouth/Throat:      Mouth: Mucous membranes are moist.     Eyes:      Conjunctiva/sclera: Conjunctivae normal.      Pupils: Pupils are equal, round, and reactive to light.       Cardiovascular:      Rate and Rhythm: Normal rate and regular rhythm.      Pulses: Normal pulses. no weak pulses.           Dorsalis pedis pulses are 2+ on the right side and 2+ on the left side.        Posterior tibial pulses are 2+ on the right side and 2+ on the left side.      Heart sounds: Normal heart sounds.   Pulmonary:      Effort: Pulmonary effort is normal.      Breath sounds: Normal breath sounds.   Abdominal:      General: Abdomen is flat.      Palpations: Abdomen is soft.   Genitourinary:     Penis: Normal.       Testes: Normal.     Musculoskeletal:         General: Normal range of motion.      Cervical back: Normal range of motion and neck supple.   Feet:      Right foot:      Skin integrity: No ulcer, skin breakdown, erythema, warmth, callus or dry skin.      Left foot:      Skin integrity: No ulcer, skin breakdown, erythema, warmth, callus or dry skin.     Skin:     General: Skin is warm and dry.      Capillary Refill: Capillary refill takes less than 2 seconds.     Neurological:      General: No focal deficit present.      Mental Status: He is alert and oriented to person, place, and " time. Mental status is at baseline.      Deep Tendon Reflexes: Reflexes are normal and symmetric.     Psychiatric:         Mood and Affect: Mood normal.         Behavior: Behavior normal.         Thought Content: Thought content normal.         Judgment: Judgment normal.       Administrative Statements   I have spent a total time of 35 minutes in caring for this patient on the day of the visit/encounter including Diagnostic results, Prognosis, Risks and benefits of tx options, Instructions for management, Patient and family education, Importance of tx compliance, Risk factor reductions, Impressions, Counseling / Coordination of care, Documenting in the medical record, Reviewing/placing orders in the medical record (including tests, medications, and/or procedures), and Obtaining or reviewing history  .       [1]   Current Outpatient Medications on File Prior to Visit   Medication Sig Dispense Refill    aspirin 81 MG tablet Take 1 tablet by mouth in the morning.      atorvastatin (LIPITOR) 80 mg tablet Take 1 tablet (80 mg total) by mouth daily 90 tablet 1    Cholecalciferol (VITAMIN D) 2000 units CAPS Take by mouth      Continuous Blood Gluc  (DEXCOM G6 ) SHARON Use daily as directed for CGM 1 Device 0    Continuous Blood Gluc Sensor (Dexcom G6 Sensor) MISC Use daily as directed for CGM - Change every 10 days 9 each 0    Continuous Blood Gluc Transmit (Dexcom G6 Transmitter) MISC USE DAILY AS DIRECTED FOR CONTINUOUS GLUCOSE MONITOR. CHANGE EVERY 3 MONTHS 1 each 3    glucagon 1 MG injection Inject as directed in the morning and in the evening.      insulin glargine (Toujeo Max SoloStar) 300 units/mL CONCENTRATED U-300 injection pen (2-unit dial) Inject 60 units under the skin nightly. 18 mL 1    Insulin Pen Needle (BD Pen Needle Ashli U/F) 32G X 4 MM MISC Use 4 per day 400 each 1    insuln lispro (HumaLOG KwikPen) 200 units/mL CONCENTRATED U-200 injection pen Inject 4x daily per sliding scale. IC 1:6. ISF  30. Max daily dose 105 units 48 mL 1    lisinopril (ZESTRIL) 20 mg tablet Take 1 tablet (20 mg total) by mouth daily 90 tablet 1    semaglutide, 2 mg/dose, (Ozempic, 2 MG/DOSE,) 8 mg/ mL injection pen Inject 0.75 mL (2 mg total) under the skin every 7 days 9 mL 1    glucose blood (ONE TOUCH ULTRA TEST) test strip Test 6x daily (Patient not taking: Reported on 5/15/2025) 600 each 3    [DISCONTINUED] bimatoprost (Lumigan) 0.01 % ophthalmic drops Administer 1 drop to both eyes daily at bedtime (Patient not taking: Reported on 5/15/2025) 5 mL 0    [DISCONTINUED] Continuous Blood Gluc Transmit (Dexcom G6 Transmitter) MISC Use daily as directed for CGM - Change every 3 months (Patient not taking: Reported on 8/28/2024) 1 each 0    [DISCONTINUED] Continuous Glucose Sensor (Dexcom G6 Sensor) MISC Use daily as directed for CGM - Change every 10 days (Patient not taking: Reported on 4/22/2025) 9 each 1    [DISCONTINUED] Continuous Glucose Transmitter (Dexcom G6 Transmitter) MISC Use daily as directed for CGM - Change every 3 months (Patient not taking: Reported on 4/22/2025) 1 each 1    [DISCONTINUED] methocarbamol (Robaxin-750) 750 mg tablet Take 1 tablet (750 mg total) by mouth every 8 (eight) hours as needed for muscle spasms (Patient not taking: Reported on 8/28/2024) 90 tablet 1    [DISCONTINUED] Multiple Vitamins-Minerals (MULTIVITAMINS PLUS ZINC) CAPS Take by mouth (Patient not taking: Reported on 8/28/2024)      [DISCONTINUED] ONETOUCH DELICA LANCETS FINE MISC Use (Patient not taking: Reported on 5/15/2025)       No current facility-administered medications on file prior to visit.

## 2025-06-25 ENCOUNTER — TELEPHONE (OUTPATIENT)
Age: 55
End: 2025-06-25

## 2025-06-25 DIAGNOSIS — E13.9 TYPE 1.5 DIABETES, MANAGED AS TYPE 2 (HCC): ICD-10-CM

## 2025-06-25 DIAGNOSIS — E10.65 TYPE 1 DIABETES MELLITUS WITH HYPERGLYCEMIA (HCC): ICD-10-CM

## 2025-06-25 DIAGNOSIS — I10 PRIMARY HYPERTENSION: ICD-10-CM

## 2025-06-25 DIAGNOSIS — E78.2 MIXED HYPERLIPIDEMIA: ICD-10-CM

## 2025-06-25 NOTE — TELEPHONE ENCOUNTER
Patient asking if all of his medications ordered by us can be sent to [facility] mail delivery    Toujeo  -18 mL    Humalog  -48 mL    Lisinopril 20 mg   -90 day supply    Atorvastatin 80mg  -90 day supply    Ozempic 2 mg   -9 mL    Insulin pen needles  -400 each

## 2025-06-25 NOTE — TELEPHONE ENCOUNTER
"Patient calling in regard to all prescriptions    States \"I know its a pain but can you have my prescriptions form [facility]\"    Patient requesting a call when completed as he as additional questions as to when to expect delivery    Please advise  "

## 2025-06-26 DIAGNOSIS — E16.2 HYPOGLYCEMIA: ICD-10-CM

## 2025-06-26 DIAGNOSIS — E10.65 TYPE 1 DIABETES MELLITUS WITH HYPERGLYCEMIA (HCC): ICD-10-CM

## 2025-06-26 DIAGNOSIS — E10.65 TYPE 1 DIABETES MELLITUS WITH HYPERGLYCEMIA (HCC): Primary | ICD-10-CM

## 2025-06-26 RX ORDER — INSULIN LISPRO 200 [IU]/ML
INJECTION, SOLUTION SUBCUTANEOUS
Qty: 48 ML | Refills: 1 | Status: SHIPPED | OUTPATIENT
Start: 2025-06-26

## 2025-06-26 RX ORDER — ATORVASTATIN CALCIUM 80 MG/1
80 TABLET, FILM COATED ORAL DAILY
Qty: 90 TABLET | Refills: 1 | Status: SHIPPED | OUTPATIENT
Start: 2025-06-26

## 2025-06-26 RX ORDER — PEN NEEDLE, DIABETIC 32GX 5/32"
NEEDLE, DISPOSABLE MISCELLANEOUS
Qty: 400 EACH | Refills: 1 | Status: SHIPPED | OUTPATIENT
Start: 2025-06-26

## 2025-06-26 RX ORDER — INSULIN GLARGINE 300 U/ML
INJECTION, SOLUTION SUBCUTANEOUS
Qty: 18 ML | Refills: 1 | Status: SHIPPED | OUTPATIENT
Start: 2025-06-26

## 2025-06-26 RX ORDER — LISINOPRIL 20 MG/1
20 TABLET ORAL DAILY
Qty: 90 TABLET | Refills: 1 | Status: SHIPPED | OUTPATIENT
Start: 2025-06-26

## 2025-06-27 RX ORDER — PROCHLORPERAZINE 25 MG/1
SUPPOSITORY RECTAL
Qty: 9 EACH | Refills: 1 | Status: SHIPPED | OUTPATIENT
Start: 2025-06-27

## 2025-06-27 NOTE — TELEPHONE ENCOUNTER
Requested medication(s) are due for refill today: Yes  **If antibiotic or given during sick visit, contact patient to discuss current symptoms.   **Confirm prescribing provider    LOV:  05/15/2025  **If longer then 1 year, contact patient to schedule annual PRIOR to refilling. Once scheduled, adjust refill for 30 days, no refills.  **Update CareEverywhere to confirm not being seen elsewhere    NOV:  05/18/2026    Is patient due for annual visit: No  **If future appointment, adjust to annual/follow up.  ** No appointment call to schedule annual/follow up.    Route to PCP, unless PCP no longer here, then physician they are seeing next.

## 2025-06-30 ENCOUNTER — TELEPHONE (OUTPATIENT)
Dept: ENDOCRINOLOGY | Facility: CLINIC | Age: 55
End: 2025-06-30

## 2025-06-30 NOTE — TELEPHONE ENCOUNTER
PA for Ozempic 2mg SUBMITTED to [employer]    via    []CMM-KEY:   [x]Surescripts-Case ID # [id number]   []Availity-Auth ID # NDC #   []Faxed to plan   []Other website   []Phone call Case ID #     [x]PA sent as URGENT    All office notes, labs and other pertaining documents and studies sent. Clinical questions answered. Awaiting determination from insurance company.     Turnaround time for your insurance to make a decision on your Prior Authorization can take 7-21 business days.

## 2025-07-01 ENCOUNTER — TELEPHONE (OUTPATIENT)
Age: 55
End: 2025-07-01

## 2025-07-01 DIAGNOSIS — E10.65 TYPE 1 DIABETES MELLITUS WITH HYPERGLYCEMIA (HCC): Primary | ICD-10-CM

## 2025-07-01 RX ORDER — INSULIN GLARGINE-YFGN 100 [IU]/ML
INJECTION, SOLUTION SUBCUTANEOUS
Qty: 60 ML | Refills: 1 | Status: SHIPPED | OUTPATIENT
Start: 2025-07-01

## 2025-07-01 RX ORDER — INSULIN ASPART 100 [IU]/ML
INJECTION, SOLUTION INTRAVENOUS; SUBCUTANEOUS
Qty: 96 ML | Refills: 1 | Status: SHIPPED | OUTPATIENT
Start: 2025-07-01

## 2025-07-01 NOTE — TELEPHONE ENCOUNTER
07/01/25  Screened by: Violet James    Referring Provider PCP    Pre- Screening:     There is no height or weight on file to calculate BMI. BMI - 36.12  Has patient been referred for a routine screening Colonoscopy? yes  Is the patient between 45-75 years old? yes      Previous Colonoscopy no   If yes:    Date:     Facility:     Reason:       Does the patient want to see a Gastroenterologist prior to their procedure OR are they having any GI symptoms? no    Has the patient been hospitalized or had abdominal surgery in the past 6 months? no    Does the patient use supplemental oxygen? no    Does the patient take Coumadin, Lovenox, Plavix, Elliquis, Xarelto, or other blood thinning medication? no    Has the patient had a stroke, cardiac event, or stent placed in the past year? no        If patient is between 45yrs - 49yrs, please advise patient that we will have to confirm benefits & coverage with their insurance company for a routine screening colonoscopy.

## 2025-07-01 NOTE — TELEPHONE ENCOUNTER
Scheduled date of colonoscopy (as of today): Monday 9/26/2025  Physician performing colonoscopy: Dr Don  Location of colonoscopy: [facility]  Bowel prep reviewed with patient: Lucina Pearce  Instructions reviewed with patient by:FW - Via e-mail on file  Clearances: N/A

## 2025-07-01 NOTE — LETTER
Hello,    Attached are your prep instructions for your upcoming colonoscopy scheduled on Monday 9/29/2025 with Dr. Don.  On Friday 9/26/2026 the GI lab will be calling between 2pm and 6pm with your arrival time for your procedure.   If you have any questions, please feel free to contact our office at [phone].    Address to our location:    Thank you for choosing [facility]!

## 2025-07-02 ENCOUNTER — OFFICE VISIT (OUTPATIENT)
Dept: SLEEP CENTER | Facility: CLINIC | Age: 55
End: 2025-07-02
Payer: COMMERCIAL

## 2025-07-02 VITALS
BODY MASS INDEX: 37.38 KG/M2 | WEIGHT: 267 LBS | SYSTOLIC BLOOD PRESSURE: 128 MMHG | HEIGHT: 71 IN | DIASTOLIC BLOOD PRESSURE: 72 MMHG

## 2025-07-02 DIAGNOSIS — E66.9 OBESITY (BMI 30-39.9): ICD-10-CM

## 2025-07-02 DIAGNOSIS — F51.12 INSUFFICIENT SLEEP SYNDROME: ICD-10-CM

## 2025-07-02 DIAGNOSIS — E10.65 TYPE 1 DIABETES MELLITUS WITH HYPERGLYCEMIA (HCC): ICD-10-CM

## 2025-07-02 DIAGNOSIS — R68.2 DRY MOUTH: ICD-10-CM

## 2025-07-02 DIAGNOSIS — G47.33 OSA (OBSTRUCTIVE SLEEP APNEA): Primary | ICD-10-CM

## 2025-07-02 PROCEDURE — 99214 OFFICE O/P EST MOD 30 MIN: CPT | Performed by: INTERNAL MEDICINE

## 2025-07-02 NOTE — PATIENT INSTRUCTIONS
Strategies to improve dry mouth were discussed. Specifically, ensuring adequate nasal patency, adjusting heat and humidity settings on PAP machine, using Biotene mouthwash &/or Xylimelt.      Nursing Support:  When: Monday through Friday 7:30A-4:30PM except holidays  Where: Our direct line is [phone]  *3  *1.      If you are having a true emergency please call 911.  In the event that the line is busy or it is after hours please leave a voice message and we will return your call.  Please speak clearly, leaving your full name, birth date, best number to reach you and the reason for your call.   Medication refills: We will need the name of the medication, the dosage, the ordering provider, whether you get a 30 or 90 day refill, and the pharmacy name and address.  Medications will be ordered by the provider only.  Nurses cannot call in prescriptions.  Please allow 7 days for medication refills.  Physician requested updates: If your provider requested that you call with an update after starting medication, please be ready to provide us the medication and dosage, what time you take your medication, the time you attempt to fall asleep, time you fall asleep, when you wake up, and what time you get out of bed.  Sleep Study Results: We will contact you with sleep study results and/or next steps after the physician has reviewed your testing.

## 2025-07-02 NOTE — PROGRESS NOTES
Name: Alex Wilde      : 1970      MRN: [id number]  Encounter Provider: Antwon Moran MD  Encounter Date: 2025   Encounter department: [facility] SLEEP MEDICINE [place]  :  Assessment & Plan  AYUSH (obstructive sleep apnea)    Orders:    PAP DME Resupply/Reorder    Insufficient sleep syndrome         Dry mouth         Type 1 diabetes mellitus with hyperglycemia (HCC)    Lab Results   Component Value Date    HGBA1C 6.9 (A) 2025            Obesity (BMI 30-39.9)               PLAN: Above listed Problems & Comorbidities were Addressed this Visit.  Improved/stable/controlled/resolved conditions as reviewed in notes.   Results of prior studies and physiologic data reviewed  with the patient.   I discussed treatment options with risks and benefits.  Treatment with  PAP is medically necessary and Alex is agreable to continue use.   Care of equipment, methods to improve comfort using PAP and importance of compliance with therapy were discussed.  Pressure setting:continue 17-20 cmH2O. Mask type full face  Rx provided to replace supplies and Care coordinated with DME provider.   Strategies to improve dry mouth were discussed. Specifically, ensuring adequate nasal patency, adjusting heat and humidity settings on PAP machine, using Biotene mouthwash &/or Xylimelt.  Discussed strategies for weight reduction.    I also advised allowing sufficient opportunity for sleep regularly targeting upwards of 7 hours.  Follow-up is advised in 1 year or sooner if needed to monitor progress, compliance and to adjust therapy.        History of Present Illness   HPI          Follow-Up Note - Sleep Center   Alex Wilde  55 y.o. male  :1970  MRN:[id number]  DOS:2025    CC: I saw this patient for follow-up in clinic today for Sleep disordered breathing, Coexisting Sleep and Medical Problems.  He is using a Homa machine. Interval changes: None reported.      Results of prior studies: A diagnostic study in   "demonstrated AHI of 34 per hour, higher during stage REM. Minimum oxygen saturation was 82% and 10.3% the study spent with saturations below 90%. During the subsequent therapeutic study, sleep disordered breathing was successfully remediated with nasal CPAP at 16 cm H2O.     PFSH, Problem List, Medications & Allergies were reviewed in EMR.   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    He has a current medication list which includes the following prescription(s): aspirin, atorvastatin, vitamin d, dexcom g6 , dexcom g6 transmitter, dexcom g6 sensor, glucagon, glucose blood, insulin aspart, insulin glargine-yfgn, bd pen needle amilcar u/f, lisinopril, and semaglutide (2 mg/dose).    PHYSIOLOGICAL DATA REVIEW : Using PAP > 4 hours/night 100%. Estimated MORGAN 0.9/hour with pressure of 17.3cm H2O@90th/95th percentile;.  INTERPRETATION: Compliance is excellent; Pressure setting is:within target range; ;     SUBJECTIVE: With respect to use of PAP, Alex  is experiencing some adverse effects: dry mouth/throat.He derives benefit.. Is satisfied with sleep and daytime function.     Sleep Routine: Alex reports getting 6-7 hrs sleep; he has no difficulty initiating and maintaining sleep . He arises spontaneously and feels refreshed.Alex]denies excessive daytime sleepiness,.  He rated himself at Total score: 5 /24 on the [place] Sleepiness Scale.   Other issues: None reported.     Habits: Reports that he has never smoked. He has never been exposed to tobacco smoke. His smokeless tobacco use includes chew.,  Reports that he does not currently use alcohol.,  Reports no history of drug use., Caffeine use: limited; Exercise routine: regular.      ROS: Significant for weight is stable on his scale at home..    EXAM: /72   Ht 5' 11\" (1.803 m)   Wt 121 kg (267 " "lb)   BMI 37.24 kg/m²     Wt Readings from Last 3 Encounters:   07/02/25 121 kg (267 lb)   05/15/25 117 kg (259 lb)   04/22/25 120 kg (265 lb 6.4 oz)      Patient is well groomed; well appearing.   CNS: Alert, orientated, speech clear & coherent  Psych: cooperative and in no distress. Mental state: Appears normal.  H&N: EOMI; NC/AT: No facial pressure marks, no rashes.    Skin/Extrem: col & hydration normal; no edema  Resp: Respiratory effort is normal  Physical findings otherwise essentially unchanged from previous.    Lab Results   Component Value Date     11/06/2015    SODIUM 140 07/05/2024    K 4.3 07/05/2024     07/05/2024    CO2 28 07/05/2024    ANIONGAP 5 11/06/2015    AGAP 9 07/05/2024    BUN 21 07/05/2024    CREATININE 0.83 07/05/2024    GLUC 262 (H) 04/04/2018    GLUF 191 (H) 07/05/2024    CALCIUM 9.2 07/05/2024    AST 14 07/05/2024    ALT 17 07/05/2024    ALKPHOS 72 07/05/2024    PROT 7.2 11/06/2015    TP 6.7 07/05/2024    BILITOT 0.61 11/06/2015    TBILI 0.47 07/05/2024    EGFR 101 07/05/2024   ;   Lab Results   Component Value Date    WBC 4.43 10/18/2022    HGB 13.0 10/18/2022    HCT 40.5 10/18/2022    MCV 89 10/18/2022     10/18/2022   : [unfilled]    Sincerely,     Authenticated electronically on 07/02/25   Board Certified Specialist     Portions of the record may have been created with voice recognition software. Occasional wrong word or \"sound a like\" substitutions may have occurred due to the inherent limitations of voice recognition software. There may also be notations and random deletions of words or characters from malfunctioning software. Read the chart carefully and recognize, using context, where substitutions/deletions have occurred.          Review of Systems           "

## 2025-07-03 ENCOUNTER — TELEPHONE (OUTPATIENT)
Dept: SLEEP CENTER | Facility: CLINIC | Age: 55
End: 2025-07-03

## 2025-07-09 NOTE — TELEPHONE ENCOUNTER
PA for Ozempic, 2 MG RESUBMITTED to [facility]    via    [x]Phone call Case ID # [id number]    [x]PA sent as URGENT    All office notes, labs and other pertaining documents and studies sent. Clinical questions answered. Awaiting determination from insurance company.     Turnaround time for your insurance to make a decision on your Prior Authorization can take 7-21 business days.

## 2025-07-10 NOTE — TELEPHONE ENCOUNTER
PA for Ozempic, 2 MG  APPROVED     Date(s) approved 7/9/25-7/9/26    Case #[id number]    Patient advised by          []MyChart Message  []Phone call   []LMOM  [x]L/M to call office as no active Communication consent on file  []Unable to leave detailed message as VM not approved on Communication consent       Pharmacy advised by    [x]Fax  []Phone call  []Secure Chat    Specialty Pharmacy    []      Approval letter scanned into Media Yes

## 2025-07-24 ENCOUNTER — OFFICE VISIT (OUTPATIENT)
Dept: FAMILY MEDICINE CLINIC | Facility: CLINIC | Age: 55
End: 2025-07-24
Payer: COMMERCIAL

## 2025-07-24 VITALS
SYSTOLIC BLOOD PRESSURE: 122 MMHG | OXYGEN SATURATION: 96 % | HEART RATE: 89 BPM | DIASTOLIC BLOOD PRESSURE: 78 MMHG | BODY MASS INDEX: 34.72 KG/M2 | RESPIRATION RATE: 16 BRPM | HEIGHT: 71 IN | WEIGHT: 248 LBS | TEMPERATURE: 98 F

## 2025-07-24 DIAGNOSIS — M54.50 LOW BACK PAIN, UNSPECIFIED BACK PAIN LATERALITY, UNSPECIFIED CHRONICITY, UNSPECIFIED WHETHER SCIATICA PRESENT: Primary | ICD-10-CM

## 2025-07-24 DIAGNOSIS — I10 BENIGN ESSENTIAL HYPERTENSION: ICD-10-CM

## 2025-07-24 DIAGNOSIS — E66.9 OBESITY (BMI 30-39.9): ICD-10-CM

## 2025-07-24 DIAGNOSIS — E10.65 TYPE 1 DIABETES MELLITUS WITH HYPERGLYCEMIA (HCC): ICD-10-CM

## 2025-07-24 DIAGNOSIS — M62.838 MUSCLE SPASM: ICD-10-CM

## 2025-07-24 PROCEDURE — 99214 OFFICE O/P EST MOD 30 MIN: CPT | Performed by: FAMILY MEDICINE

## 2025-07-24 RX ORDER — METHOCARBAMOL 500 MG/1
500 TABLET, FILM COATED ORAL DAILY PRN
Qty: 20 TABLET | Refills: 0 | Status: SHIPPED | OUTPATIENT
Start: 2025-07-24

## 2025-07-24 RX ORDER — PREDNISONE 10 MG/1
TABLET ORAL
Qty: 21 TABLET | Refills: 0 | Status: SHIPPED | OUTPATIENT
Start: 2025-07-24

## 2025-07-24 NOTE — PATIENT INSTRUCTIONS
Rest low back and rec prednisone and Robaxin prn and call if worse. Lose weight as directed to get BMI lower than 25

## 2025-07-24 NOTE — PROGRESS NOTES
Name: Alex Wilde      : 1970      MRN: [id number]  Encounter Provider: Donald Newton DO  Encounter Date: 2025   Encounter department: [facility] PRIMARY CARE  Chief Complaint   Patient presents with   • Back Pain     Pt is here for back pain. Pt get into Dr. Rucker until 2025     Patient Instructions   Rest low back and rec prednisone and Robaxin prn and call if worse. Lose weight as directed to get BMI lower than 25    Assessment & Plan  Type 1 diabetes mellitus with hyperglycemia (HCC)    Lab Results   Component Value Date    HGBA1C 6.9 (A) 2025   stable         Low back pain, unspecified back pain laterality, unspecified chronicity, unspecified whether sciatica present  Prednisone as directed  Orders:  •  predniSONE 10 mg tablet; Take 60 mg po day#1, 50 mg po day#2, 40 mg po day#3, 30 mg po day#4, 20 mg po day#5m and 10 mg po day#6  •  methocarbamol (ROBAXIN) 500 mg tablet; Take 1 tablet (500 mg total) by mouth daily as needed for muscle spasms    Muscle spasm  Robaxin PRN  Orders:  •  methocarbamol (ROBAXIN) 500 mg tablet; Take 1 tablet (500 mg total) by mouth daily as needed for muscle spasms    Obesity (BMI 30-39.9)  Lose weight as directed          Benign essential hypertension  stable            History of Present Illness     Back Pain (Pt is here for back pain. Pt get into Dr. Rucker until 2025)    Back Pain      Review of Systems   Constitutional: Negative.    HENT: Negative.     Eyes: Negative.    Respiratory: Negative.     Cardiovascular: Negative.    Gastrointestinal: Negative.    Endocrine: Negative.    Genitourinary: Negative.    Musculoskeletal:  Positive for back pain (low back pain).   Skin: Negative.    Allergic/Immunologic: Negative.    Neurological: Negative.    Hematological: Negative.    Psychiatric/Behavioral: Negative.       Past Medical History[1]  Past Surgical History[1]  Family History[1]  Social History[1]  Medications[1]  No Known  "Allergies  Immunization History   Administered Date(s) Administered   • COVID-19 MODERNA VACC 0.25 ML IM BOOSTER 11/30/2021   • COVID-19 MODERNA VACC 0.5 ML IM 01/12/2021, 02/06/2021   • INFLUENZA 11/23/2020, 11/19/2022   • Influenza Quadrivalent Preservative Free 3 years and older IM 12/09/2014   • Influenza Quadrivalent, 6-35 Months IM 10/14/2016, 10/18/2017   • Influenza, injectable, quadrivalent, preservative free 0.5 mL 10/04/2018   • Influenza, seasonal, injectable 11/06/2013   • Tuberculin Skin Test-PPD Intradermal 09/19/2018   • influenza, injectable, quadrivalent 11/23/2020     Objective   /78   Pulse 89   Temp 98 °F (36.7 °C) (Temporal)   Resp 16   Ht 5' 11\" (1.803 m)   Wt 112 kg (248 lb)   SpO2 96%   BMI 34.59 kg/m²     Physical Exam  Constitutional:       Appearance: He is well-developed.   HENT:      Head: Normocephalic and atraumatic.      Right Ear: External ear normal.      Left Ear: External ear normal.      Nose: Nose normal.     Eyes:      Conjunctiva/sclera: Conjunctivae normal.      Pupils: Pupils are equal, round, and reactive to light.       Cardiovascular:      Rate and Rhythm: Normal rate and regular rhythm.      Pulses: Normal pulses.      Heart sounds: Normal heart sounds.   Pulmonary:      Effort: Pulmonary effort is normal.      Breath sounds: Normal breath sounds.     Musculoskeletal:      Cervical back: Normal range of motion and neck supple.      Comments: Low back pain with motion     Skin:     General: Skin is warm and dry.      Capillary Refill: Capillary refill takes less than 2 seconds.     Neurological:      General: No focal deficit present.      Mental Status: He is alert and oriented to person, place, and time. Mental status is at baseline.      Deep Tendon Reflexes: Reflexes are normal and symmetric.     Psychiatric:         Mood and Affect: Mood normal.         Behavior: Behavior normal.         Thought Content: Thought content normal.         Judgment: " Judgment normal.       Administrative Statements   I have spent a total time of 30 minutes in caring for this patient on the day of the visit/encounter including Diagnostic results, Prognosis, Risks and benefits of tx options, Instructions for management, Patient and family education, Importance of tx compliance, Risk factor reductions, Impressions, Counseling / Coordination of care, Documenting in the medical record, Reviewing/placing orders in the medical record (including tests, medications, and/or procedures), and Obtaining or reviewing history  .       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   • Vitamin D deficiency    [1]  Past Surgical History:  Procedure Laterality Date   • NO PAST SURGERIES     [1]  Family History  Problem Relation Name Age of Onset   • Lung cancer Mother     • Cancer Maternal Grandfather Grandfather    • Diabetes Maternal Grandfather Grandfather    • Diabetes Family     • Other Family          cardiac disorder   • Hypertension Family     [1]  Social History  Tobacco Use   • Smoking status: Never     Passive exposure: Never   • Smokeless tobacco: Current     Types: Chew   • Tobacco comments:     Grandfather and mother   Vaping Use   • Vaping status: Never Used   Substance and Sexual Activity   • Alcohol use: Not Currently     Comment: socially   • Drug use: No   [1]  Current Outpatient Medications on File Prior to Visit   Medication Sig   • aspirin 81 MG tablet Take 1 tablet by mouth in the morning.   • atorvastatin (LIPITOR) 80 mg tablet Take 1 tablet (80 mg total) by mouth daily   • Cholecalciferol (VITAMIN D) 2000 units CAPS Take by mouth   • Continuous Blood Gluc  (DEXCOM G6 ) SHARON Use daily as directed for CGM   • Continuous Blood Gluc Transmit (Dexcom G6 Transmitter) MISC USE DAILY AS DIRECTED FOR  CONTINUOUS GLUCOSE MONITOR. CHANGE EVERY 3 MONTHS   • Continuous Glucose Sensor (Dexcom G6 Sensor) MISC Use daily as directed for CGM - Change every 10 days   • glucagon 1 MG injection Inject as directed in the morning and in the evening used as emergency for hypoglycemia   • glucose blood (ONE TOUCH ULTRA TEST) test strip Test 6x daily   • insulin aspart (NovoLOG FlexPen) 100 UNIT/ML injection pen Inject 4x daily per sliding scale. IC 1:6. ISF 30. Max daily dose 105 units.   • Insulin Glargine-yfgn (Semglee, yfgn,) 100 UNIT/ML SOPN Inject 60 units nightly.   • Insulin Pen Needle (BD Pen Needle Ashli U/F) 32G X 4 MM MISC Use 4 per day   • lisinopril (ZESTRIL) 20 mg tablet Take 1 tablet (20 mg total) by mouth daily   • semaglutide, 2 mg/dose, (Ozempic, 2 MG/DOSE,) 8 mg/ mL injection pen Inject 0.75 mL (2 mg total) under the skin every 7 days

## 2025-08-15 ENCOUNTER — CONSULT (OUTPATIENT)
Dept: PAIN MEDICINE | Facility: CLINIC | Age: 55
End: 2025-08-15
Payer: COMMERCIAL

## 2025-08-15 ENCOUNTER — APPOINTMENT (OUTPATIENT)
Dept: LAB | Facility: CLINIC | Age: 55
End: 2025-08-15
Attending: PREVENTIVE MEDICINE